# Patient Record
Sex: FEMALE | Race: WHITE | Employment: PART TIME | URBAN - METROPOLITAN AREA
[De-identification: names, ages, dates, MRNs, and addresses within clinical notes are randomized per-mention and may not be internally consistent; named-entity substitution may affect disease eponyms.]

---

## 2017-01-05 PROBLEM — M43.17 ACQUIRED SPONDYLOLISTHESIS OF LUMBOSACRAL REGION: Status: ACTIVE | Noted: 2017-01-05

## 2017-01-13 RX ORDER — LEVOTHYROXINE SODIUM 0.05 MG/1
TABLET ORAL
Qty: 90 TABLET | Refills: 1 | Status: SHIPPED | OUTPATIENT
Start: 2017-01-13 | End: 2017-07-25 | Stop reason: SDUPTHER

## 2017-02-08 ENCOUNTER — OFFICE VISIT (OUTPATIENT)
Dept: FAMILY MEDICINE CLINIC | Age: 57
End: 2017-02-08

## 2017-02-08 VITALS
BODY MASS INDEX: 32.96 KG/M2 | TEMPERATURE: 96.6 F | WEIGHT: 186 LBS | SYSTOLIC BLOOD PRESSURE: 120 MMHG | OXYGEN SATURATION: 98 % | DIASTOLIC BLOOD PRESSURE: 86 MMHG | RESPIRATION RATE: 12 BRPM | HEIGHT: 63 IN | HEART RATE: 83 BPM

## 2017-02-08 DIAGNOSIS — R31.9 HEMATURIA: ICD-10-CM

## 2017-02-08 DIAGNOSIS — N30.01 ACUTE CYSTITIS WITH HEMATURIA: Primary | ICD-10-CM

## 2017-02-08 LAB
ANION GAP SERPL CALCULATED.3IONS-SCNC: 11 MEQ/L (ref 7–13)
ANTISTREPTOLYSIN-O: 48 IU/ML (ref 0–200)
BILIRUBIN, POC: 0
BLOOD URINE, POC: 200
BUN BLDV-MCNC: 22 MG/DL (ref 6–20)
CALCIUM SERPL-MCNC: 10.1 MG/DL (ref 8.6–10.2)
CHLORIDE BLD-SCNC: 103 MEQ/L (ref 98–107)
CLARITY, POC: ABNORMAL
CO2: 25 MEQ/L (ref 22–29)
COLOR, POC: YELLOW
CREAT SERPL-MCNC: 0.73 MG/DL (ref 0.5–0.9)
GFR AFRICAN AMERICAN: >60
GFR NON-AFRICAN AMERICAN: >60
GLUCOSE BLD-MCNC: 97 MG/DL (ref 74–109)
GLUCOSE URINE, POC: 0
KETONES, POC: 0
LEUKOCYTE EST, POC: 0
NITRITE, POC: 0
PH, POC: 5.5
POTASSIUM SERPL-SCNC: 4.6 MEQ/L (ref 3.5–5.1)
PROTEIN, POC: 0
SODIUM BLD-SCNC: 139 MEQ/L (ref 132–144)
SPECIFIC GRAVITY, POC: 1015
UROBILINOGEN, POC: 0

## 2017-02-08 PROCEDURE — 99213 OFFICE O/P EST LOW 20 MIN: CPT | Performed by: FAMILY MEDICINE

## 2017-02-08 PROCEDURE — 81003 URINALYSIS AUTO W/O SCOPE: CPT | Performed by: FAMILY MEDICINE

## 2017-02-08 RX ORDER — SULFAMETHOXAZOLE AND TRIMETHOPRIM 400; 80 MG/1; MG/1
1 TABLET ORAL 2 TIMES DAILY
Qty: 20 TABLET | Refills: 0 | Status: SHIPPED | OUTPATIENT
Start: 2017-02-08 | End: 2017-02-18

## 2017-02-08 ASSESSMENT — ENCOUNTER SYMPTOMS
VOMITING: 0
ABDOMINAL PAIN: 0
FACIAL SWELLING: 0
NAUSEA: 0

## 2017-02-08 ASSESSMENT — LIFESTYLE VARIABLES: TOBACCO_USE: 0

## 2017-02-10 LAB — URINE CULTURE, ROUTINE: NORMAL

## 2017-02-18 ENCOUNTER — HOSPITAL ENCOUNTER (OUTPATIENT)
Dept: CT IMAGING | Age: 57
Discharge: HOME | End: 2017-02-18
Payer: COMMERCIAL

## 2017-02-18 VITALS — RESPIRATION RATE: 18 BRPM | DIASTOLIC BLOOD PRESSURE: 77 MMHG | HEART RATE: 89 BPM | SYSTOLIC BLOOD PRESSURE: 117 MMHG

## 2017-02-18 DIAGNOSIS — R31.9 HEMATURIA: ICD-10-CM

## 2017-02-18 PROCEDURE — 2500000003 HC RX 250 WO HCPCS: Performed by: RADIOLOGY

## 2017-02-18 PROCEDURE — 74177 CT ABD & PELVIS W/CONTRAST: CPT

## 2017-02-18 PROCEDURE — 6360000004 HC RX CONTRAST MEDICATION: Performed by: RADIOLOGY

## 2017-02-18 RX ADMIN — IOPAMIDOL 100 ML: 612 INJECTION, SOLUTION INTRAVENOUS at 10:33

## 2017-02-18 RX ADMIN — BARIUM SULFATE 450 ML: 21 SUSPENSION ORAL at 10:33

## 2017-02-22 ENCOUNTER — OFFICE VISIT (OUTPATIENT)
Dept: FAMILY MEDICINE CLINIC | Age: 57
End: 2017-02-22

## 2017-02-22 VITALS
RESPIRATION RATE: 14 BRPM | TEMPERATURE: 95.5 F | SYSTOLIC BLOOD PRESSURE: 114 MMHG | HEART RATE: 72 BPM | HEIGHT: 62 IN | WEIGHT: 185 LBS | DIASTOLIC BLOOD PRESSURE: 80 MMHG | BODY MASS INDEX: 34.04 KG/M2

## 2017-02-22 DIAGNOSIS — R31.9 HEMATURIA: ICD-10-CM

## 2017-02-22 DIAGNOSIS — N20.0 NEPHROLITHIASIS: Primary | ICD-10-CM

## 2017-02-22 PROCEDURE — 99213 OFFICE O/P EST LOW 20 MIN: CPT | Performed by: FAMILY MEDICINE

## 2017-02-22 ASSESSMENT — ENCOUNTER SYMPTOMS
FACIAL SWELLING: 0
ABDOMINAL PAIN: 0
VOMITING: 0
NAUSEA: 0

## 2017-04-17 RX ORDER — ZOLPIDEM TARTRATE 10 MG/1
TABLET ORAL
Qty: 30 TABLET | Refills: 2 | Status: SHIPPED | OUTPATIENT
Start: 2017-04-17 | End: 2017-07-10 | Stop reason: SDUPTHER

## 2017-04-26 RX ORDER — VILAZODONE HYDROCHLORIDE 40 MG/1
TABLET ORAL
Qty: 90 TABLET | Refills: 1 | Status: SHIPPED | OUTPATIENT
Start: 2017-04-26 | End: 2017-07-10 | Stop reason: ALTCHOICE

## 2017-05-01 ENCOUNTER — OFFICE VISIT (OUTPATIENT)
Dept: FAMILY MEDICINE CLINIC | Age: 57
End: 2017-05-01

## 2017-05-01 VITALS
SYSTOLIC BLOOD PRESSURE: 116 MMHG | HEART RATE: 78 BPM | RESPIRATION RATE: 16 BRPM | TEMPERATURE: 96.5 F | HEIGHT: 62 IN | DIASTOLIC BLOOD PRESSURE: 60 MMHG

## 2017-05-01 DIAGNOSIS — R31.9 HEMATURIA: Primary | ICD-10-CM

## 2017-05-01 DIAGNOSIS — E03.9 HYPOTHYROIDISM, UNSPECIFIED TYPE: ICD-10-CM

## 2017-05-01 DIAGNOSIS — F32.A DEPRESSION, UNSPECIFIED DEPRESSION TYPE: ICD-10-CM

## 2017-05-01 DIAGNOSIS — E78.2 MIXED HYPERLIPIDEMIA: ICD-10-CM

## 2017-05-01 DIAGNOSIS — Z13.31 POSITIVE DEPRESSION SCREENING: ICD-10-CM

## 2017-05-01 DIAGNOSIS — Z86.010 HISTORY OF COLON POLYPS: ICD-10-CM

## 2017-05-01 DIAGNOSIS — K59.01 SLOW TRANSIT CONSTIPATION: ICD-10-CM

## 2017-05-01 DIAGNOSIS — F41.9 ANXIETY: ICD-10-CM

## 2017-05-01 DIAGNOSIS — R31.9 HEMATURIA: ICD-10-CM

## 2017-05-01 PROBLEM — N20.0 NEPHROLITHIASIS: Status: RESOLVED | Noted: 2017-02-22 | Resolved: 2017-05-01

## 2017-05-01 LAB
BILIRUBIN, POC: NORMAL
BLOOD URINE, POC: NORMAL
CLARITY, POC: NORMAL
COLOR, POC: NORMAL
GLUCOSE URINE, POC: NORMAL
KETONES, POC: NORMAL
LEUKOCYTE EST, POC: NORMAL
NITRITE, POC: NORMAL
PH, POC: 5.5
PROTEIN, POC: NORMAL
SPECIFIC GRAVITY, POC: 1.03
UROBILINOGEN, POC: NORMAL

## 2017-05-01 PROCEDURE — 81003 URINALYSIS AUTO W/O SCOPE: CPT | Performed by: FAMILY MEDICINE

## 2017-05-01 PROCEDURE — 99214 OFFICE O/P EST MOD 30 MIN: CPT | Performed by: FAMILY MEDICINE

## 2017-05-01 PROCEDURE — G8431 POS CLIN DEPRES SCRN F/U DOC: HCPCS | Performed by: FAMILY MEDICINE

## 2017-05-01 RX ORDER — VENLAFAXINE HYDROCHLORIDE 75 MG/1
75 CAPSULE, EXTENDED RELEASE ORAL DAILY
Qty: 30 CAPSULE | Refills: 3 | Status: SHIPPED | OUTPATIENT
Start: 2017-05-01 | End: 2017-07-10 | Stop reason: SDUPTHER

## 2017-05-01 ASSESSMENT — PATIENT HEALTH QUESTIONNAIRE - PHQ9
2. FEELING DOWN, DEPRESSED OR HOPELESS: 3
5. POOR APPETITE OR OVEREATING: 2
4. FEELING TIRED OR HAVING LITTLE ENERGY: 3
8. MOVING OR SPEAKING SO SLOWLY THAT OTHER PEOPLE COULD HAVE NOTICED. OR THE OPPOSITE, BEING SO FIGETY OR RESTLESS THAT YOU HAVE BEEN MOVING AROUND A LOT MORE THAN USUAL: 0
SUM OF ALL RESPONSES TO PHQ9 QUESTIONS 1 & 2: 4
SUM OF ALL RESPONSES TO PHQ QUESTIONS 1-9: 16
7. TROUBLE CONCENTRATING ON THINGS, SUCH AS READING THE NEWSPAPER OR WATCHING TELEVISION: 0
10. IF YOU CHECKED OFF ANY PROBLEMS, HOW DIFFICULT HAVE THESE PROBLEMS MADE IT FOR YOU TO DO YOUR WORK, TAKE CARE OF THINGS AT HOME, OR GET ALONG WITH OTHER PEOPLE: 1
3. TROUBLE FALLING OR STAYING ASLEEP: 3
6. FEELING BAD ABOUT YOURSELF - OR THAT YOU ARE A FAILURE OR HAVE LET YOURSELF OR YOUR FAMILY DOWN: 3
1. LITTLE INTEREST OR PLEASURE IN DOING THINGS: 1
9. THOUGHTS THAT YOU WOULD BE BETTER OFF DEAD, OR OF HURTING YOURSELF: 1

## 2017-05-03 LAB — URINE CULTURE, ROUTINE: NORMAL

## 2017-07-10 ENCOUNTER — CLINICAL DOCUMENTATION (OUTPATIENT)
Dept: FAMILY MEDICINE CLINIC | Age: 57
End: 2017-07-10

## 2017-07-10 ENCOUNTER — OFFICE VISIT (OUTPATIENT)
Dept: FAMILY MEDICINE CLINIC | Age: 57
End: 2017-07-10

## 2017-07-10 VITALS
TEMPERATURE: 97.4 F | WEIGHT: 179 LBS | BODY MASS INDEX: 32.94 KG/M2 | HEIGHT: 62 IN | HEART RATE: 76 BPM | RESPIRATION RATE: 16 BRPM | SYSTOLIC BLOOD PRESSURE: 122 MMHG | DIASTOLIC BLOOD PRESSURE: 80 MMHG

## 2017-07-10 DIAGNOSIS — E03.9 HYPOTHYROIDISM, UNSPECIFIED TYPE: ICD-10-CM

## 2017-07-10 DIAGNOSIS — F32.A DEPRESSION, UNSPECIFIED DEPRESSION TYPE: ICD-10-CM

## 2017-07-10 DIAGNOSIS — Z12.31 ENCOUNTER FOR SCREENING MAMMOGRAM FOR BREAST CANCER: ICD-10-CM

## 2017-07-10 DIAGNOSIS — F41.9 ANXIETY: ICD-10-CM

## 2017-07-10 DIAGNOSIS — E78.2 MIXED HYPERLIPIDEMIA: Primary | ICD-10-CM

## 2017-07-10 DIAGNOSIS — E78.2 MIXED HYPERLIPIDEMIA: ICD-10-CM

## 2017-07-10 LAB
ALBUMIN SERPL-MCNC: 4.1 G/DL (ref 3.9–4.9)
ALP BLD-CCNC: 90 U/L (ref 40–130)
ALT SERPL-CCNC: 14 U/L (ref 0–33)
ANION GAP SERPL CALCULATED.3IONS-SCNC: 12 MEQ/L (ref 7–13)
AST SERPL-CCNC: 17 U/L (ref 0–35)
BILIRUB SERPL-MCNC: 0.2 MG/DL (ref 0–1.2)
BUN BLDV-MCNC: 15 MG/DL (ref 6–20)
CALCIUM SERPL-MCNC: 10.4 MG/DL (ref 8.6–10.2)
CHLORIDE BLD-SCNC: 102 MEQ/L (ref 98–107)
CHOLESTEROL, TOTAL: 228 MG/DL (ref 0–199)
CO2: 26 MEQ/L (ref 22–29)
CREAT SERPL-MCNC: 0.56 MG/DL (ref 0.5–0.9)
GFR AFRICAN AMERICAN: >60
GFR NON-AFRICAN AMERICAN: >60
GLOBULIN: 2.6 G/DL (ref 2.3–3.5)
GLUCOSE BLD-MCNC: 95 MG/DL (ref 74–109)
HCT VFR BLD CALC: 41.4 % (ref 37–47)
HDLC SERPL-MCNC: 52 MG/DL (ref 40–59)
HEMOGLOBIN: 13.3 G/DL (ref 12–16)
LDL CHOLESTEROL CALCULATED: 104 MG/DL (ref 0–129)
MCH RBC QN AUTO: 29.7 PG (ref 27–31.3)
MCHC RBC AUTO-ENTMCNC: 32.1 % (ref 33–37)
MCV RBC AUTO: 92.3 FL (ref 82–100)
PDW BLD-RTO: 14.9 % (ref 11.5–14.5)
PLATELET # BLD: 223 K/UL (ref 130–400)
POTASSIUM SERPL-SCNC: 3.9 MEQ/L (ref 3.5–5.1)
RBC # BLD: 4.48 M/UL (ref 4.2–5.4)
SODIUM BLD-SCNC: 140 MEQ/L (ref 132–144)
T4 FREE: 0.95 NG/DL (ref 0.93–1.7)
TOTAL PROTEIN: 6.7 G/DL (ref 6.4–8.1)
TRIGL SERPL-MCNC: 358 MG/DL (ref 0–200)
TSH SERPL DL<=0.05 MIU/L-ACNC: 0.81 UIU/ML (ref 0.27–4.2)
WBC # BLD: 5.7 K/UL (ref 4.8–10.8)

## 2017-07-10 PROCEDURE — 99214 OFFICE O/P EST MOD 30 MIN: CPT | Performed by: FAMILY MEDICINE

## 2017-07-10 RX ORDER — ALPRAZOLAM 0.5 MG/1
0.5 TABLET ORAL 2 TIMES DAILY PRN
Qty: 60 TABLET | Refills: 2 | Status: SHIPPED | OUTPATIENT
Start: 2017-07-10 | End: 2018-01-17 | Stop reason: SDUPTHER

## 2017-07-10 RX ORDER — ZOLPIDEM TARTRATE 10 MG/1
TABLET ORAL
Qty: 30 TABLET | Refills: 2 | Status: SHIPPED | OUTPATIENT
Start: 2017-07-10 | End: 2017-10-10 | Stop reason: SDUPTHER

## 2017-07-10 RX ORDER — VENLAFAXINE HYDROCHLORIDE 75 MG/1
75 CAPSULE, EXTENDED RELEASE ORAL DAILY
Qty: 90 CAPSULE | Refills: 1 | Status: SHIPPED | OUTPATIENT
Start: 2017-07-10 | End: 2017-11-06 | Stop reason: SDUPTHER

## 2017-07-25 RX ORDER — LEVOTHYROXINE SODIUM 0.05 MG/1
TABLET ORAL
Qty: 90 TABLET | Refills: 1 | Status: SHIPPED | OUTPATIENT
Start: 2017-07-25 | End: 2018-01-18 | Stop reason: DRUGHIGH

## 2017-09-11 ENCOUNTER — HOSPITAL ENCOUNTER (OUTPATIENT)
Dept: WOMENS IMAGING | Age: 57
Discharge: HOME | End: 2017-09-11
Payer: COMMERCIAL

## 2017-09-11 DIAGNOSIS — Z12.31 ENCOUNTER FOR SCREENING MAMMOGRAM FOR BREAST CANCER: ICD-10-CM

## 2017-09-11 PROCEDURE — 77063 BREAST TOMOSYNTHESIS BI: CPT

## 2017-09-13 DIAGNOSIS — R92.8 ABNORMAL MAMMOGRAM: Primary | ICD-10-CM

## 2017-09-20 ENCOUNTER — TELEPHONE (OUTPATIENT)
Dept: FAMILY MEDICINE CLINIC | Age: 57
End: 2017-09-20

## 2017-09-20 ENCOUNTER — HOSPITAL ENCOUNTER (OUTPATIENT)
Dept: WOMENS IMAGING | Age: 57
Discharge: HOME | End: 2017-09-20
Payer: COMMERCIAL

## 2017-09-20 ENCOUNTER — HOSPITAL ENCOUNTER (OUTPATIENT)
Dept: ULTRASOUND IMAGING | Age: 57
Discharge: HOME | End: 2017-09-20
Payer: COMMERCIAL

## 2017-09-20 DIAGNOSIS — R92.8 ABNORMAL MAMMOGRAM: ICD-10-CM

## 2017-09-20 PROCEDURE — G0206 DX MAMMO INCL CAD UNI: HCPCS

## 2017-09-20 PROCEDURE — 76642 ULTRASOUND BREAST LIMITED: CPT

## 2017-09-22 DIAGNOSIS — R92.8 ABNORMAL MAMMOGRAM: Primary | ICD-10-CM

## 2017-10-06 ENCOUNTER — HOSPITAL ENCOUNTER (OUTPATIENT)
Dept: MRI IMAGING | Age: 57
Discharge: HOME | End: 2017-10-06
Payer: COMMERCIAL

## 2017-10-06 DIAGNOSIS — R92.8 ABNORMAL MAMMOGRAM: ICD-10-CM

## 2017-10-06 PROCEDURE — 6360000004 HC RX CONTRAST MEDICATION: Performed by: RADIOLOGY

## 2017-10-06 PROCEDURE — A9577 INJ MULTIHANCE: HCPCS | Performed by: RADIOLOGY

## 2017-10-06 PROCEDURE — 77058 MRI BREAST LEFT W WO CONTRAST: CPT

## 2017-10-06 PROCEDURE — 77058 MRI BREAST RIGHT W WO CONTRAST: CPT

## 2017-10-06 RX ORDER — 0.9 % SODIUM CHLORIDE 0.9 %
40 VIAL (ML) INJECTION ONCE
Status: DISCONTINUED | OUTPATIENT
Start: 2017-10-06 | End: 2017-10-09 | Stop reason: HOSPADM

## 2017-10-06 RX ADMIN — GADOBENATE DIMEGLUMINE 20 ML: 529 INJECTION, SOLUTION INTRAVENOUS at 09:15

## 2017-10-10 ENCOUNTER — OFFICE VISIT (OUTPATIENT)
Dept: FAMILY MEDICINE CLINIC | Age: 57
End: 2017-10-10

## 2017-10-10 ENCOUNTER — CLINICAL DOCUMENTATION (OUTPATIENT)
Dept: FAMILY MEDICINE CLINIC | Age: 57
End: 2017-10-10

## 2017-10-10 VITALS
SYSTOLIC BLOOD PRESSURE: 132 MMHG | TEMPERATURE: 98.3 F | HEIGHT: 62 IN | RESPIRATION RATE: 16 BRPM | HEART RATE: 84 BPM | DIASTOLIC BLOOD PRESSURE: 76 MMHG

## 2017-10-10 DIAGNOSIS — Z71.2 ENCOUNTER TO DISCUSS TEST RESULTS: Primary | ICD-10-CM

## 2017-10-10 DIAGNOSIS — F41.9 ANXIETY: ICD-10-CM

## 2017-10-10 PROCEDURE — 99213 OFFICE O/P EST LOW 20 MIN: CPT | Performed by: FAMILY MEDICINE

## 2017-10-10 RX ORDER — ZOLPIDEM TARTRATE 10 MG/1
TABLET ORAL
Qty: 30 TABLET | Refills: 2 | Status: SHIPPED | OUTPATIENT
Start: 2017-10-10 | End: 2018-01-17 | Stop reason: SDUPTHER

## 2017-10-10 NOTE — PATIENT INSTRUCTIONS
Thank you for enrolling in [address] Please follow the instructions below to securely access your online medical record. [employer] allows you to send messages to your doctor, view your test results, renew your prescriptions, schedule appointments, and more. How Do I Sign Up? 1. In your Internet browser, go to [email or web address]  2. Click on the Sign Up Now link in the Sign In box. You will see the New Member Sign Up page. 3. Enter your [employer] Access Code exactly as it appears below. You will not need to use this code after youve completed the sign-up process. If you do not sign up before the expiration date, you must request a new code. [employer] Access Code: [email or web address]  Expires: 12/3/2017  5:02 AM    4. Enter your Social Security Number ([id number]) and Date of Birth (mm/dd/yyyy) as indicated and click Submit. You will be taken to the next sign-up page. 5. Create a [employer] ID. This will be your [employer] login ID and cannot be changed, so think of one that is secure and easy to remember. 6. Create a [employer] password. You can change your password at any time. 7. Enter your Password Reset Question and Answer. This can be used at a later time if you forget your password. 8. Enter your e-mail address. You will receive e-mail notification when new information is available in [address] 9. Click Sign Up. You can now view your medical record. Additional Information  If you have questions, please contact your physician practice where you receive care. Remember, [employer] is NOT to be used for urgent needs. For medical emergencies, dial 911.

## 2017-10-10 NOTE — PROGRESS NOTES
Subjective  Dash Botello, 62 y.o. female presents today with:  Chief Complaint   Patient presents with    Results     breast mri           Past Medical History:   Diagnosis Date    Anxiety     CAD (coronary artery disease)     past episodes chest pains / evaluation by [facility] / holter monitor superventricular premature beats    Chronic back pain     Depression     Hyperlipidemia     past trx > 5 yrs /stopped Lipitor due to muscle & joint pain    Hypothyroidism     meds < 5 yrs    Osteoarthritis     major joints & back    PONV (postoperative nausea and vomiting)     Retention of urine      Past Surgical History:   Procedure Laterality Date    BREAST SURGERY  age 52    exc. benign left breast mass    [facility]AC [facility]TERIZATION  2007    [facility]    CARPAL TUNNEL RELEASE  1986    CHOLECYSTECTOMY      COLONOSCOPY  9/10/12    DR Oseas Rashid    HYSTERECTOMY  7-2009    LUMBAR FUSION N/A 11/7/2016    LEFT AND BILATERAL L4-5 MICRODISSECTION DECOMPRESSION DISKECTOMY INTERBODY POSTEROLATERAL FUSION PEDICLE SCREWS performed by Barb Gan MD at [address] [place] [address]  2005    TEAR/THROMBOSIS    SKIN CANCER EXCISION  1983    BASAL CELL    TONSILLECTOMY      WISDOM TOOTH EXTRACTION  1985     Social History     Social History    Marital status:      Spouse name: N/A    Number of children: N/A    Years of education: N/A     Occupational History    Not on file.      Social History Main Topics    Smoking status: Never Smoker    Smokeless tobacco: Never Used    Alcohol use No    Drug use: No    Sexual activity: Not on file     Other Topics Concern    Not on file     Social History Narrative    No narrative on file     Family History   Problem Relation Age of Onset    Mental Illness Mother     Alcohol Abuse Father     Arthritis Sister     Mental Illness Brother      Allergies   Allergen Reactions    Codeine Other (See Comments)     hallucinations    Lipitor [Atorvastatin] Other (See Comments) 0 - 129 mg/dL Final    WBC 07/10/2017 5.7  4.8 - 10.8 K/uL Final    RBC 07/10/2017 4.48  4.20 - 5.40 M/uL Final    Hemoglobin 07/10/2017 13.3  12.0 - 16.0 g/dL Final    Hematocrit 07/10/2017 41.4  37.0 - 47.0 % Final    MCV 07/10/2017 92.3  82.0 - 100.0 fL Final    MCH 07/10/2017 29.7  27.0 - 31.3 pg Final    MCHC 07/10/2017 32.1* 33.0 - 37.0 % Final    RDW 07/10/2017 14.9* 11.5 - 14.5 % Final    Platelets 88/49/3643 223  130 - 400 K/uL Final    Sodium 07/10/2017 140  132 - 144 mEq/L Final    Potassium 07/10/2017 3.9  3.5 - 5.1 mEq/L Final    Chloride 07/10/2017 102  98 - 107 mEq/L Final    CO2 07/10/2017 26  22 - 29 mEq/L Final    Anion Gap 07/10/2017 12  7 - 13 mEq/L Final    Glucose 07/10/2017 95  74 - 109 mg/dL Final    BUN 07/10/2017 15  6 - 20 mg/dL Final    CREATININE 07/10/2017 0.56  0.50 - 0.90 mg/dL Final    GFR Non- 07/10/2017 >60.0  >60 Final    Comment: >60 mL/min/1.73m2 EGFR, calc. for ages 25 and older using the  MDRD formula (not corrected for weight), is valid for stable  renal function.  GFR  07/10/2017 >60.0  >60 Final    Comment: >60 mL/min/1.73m2 EGFR, calc. for ages 25 and older using the  MDRD formula (not corrected for weight), is valid for stable  renal function.       Calcium 07/10/2017 10.4* 8.6 - 10.2 mg/dL Final    Total Protein 07/10/2017 6.7  6.4 - 8.1 g/dL Final    Alb 07/10/2017 4.1  3.9 - 4.9 g/dL Final    Total Bilirubin 07/10/2017 0.2  0.0 - 1.2 mg/dL Final    Alkaline Phosphatase 07/10/2017 90  40 - 130 U/L Final    ALT 07/10/2017 14  0 - 33 U/L Final    AST 07/10/2017 17  0 - 35 U/L Final    Globulin 07/10/2017 2.6  2.3 - 3.5 g/dL Final    TSH 07/10/2017 0.812  0.270 - 4.200 uIU/mL Final    T4 Free 07/10/2017 0.95  0.93 - 1.70 ng/dL Final     Health Maintenance   Topic Date Due    Hepatitis C screen  1960    HIV screen  04/18/1975    Colon cancer screen colonoscopy  09/10/2015    Flu vaccine (1) 09/01/2017    DTaP/Tdap/Td vaccine (1 - Tdap) 11/01/2017 (Originally 4/18/1979)    Breast cancer screen  09/20/2019    Lipid screen  07/10/2022       No results found for this visit on 10/10/17. Objective    Vitals:    10/10/17 1401   BP: 132/76   Pulse: 84   Resp: 16   Temp: 98.3 °F (36.8 °C)   TempSrc: Tympanic   Height: 5' 2\" (1.575 m)       PHYSICAL EXAMINATION:        GENERAL:    The patient appears well nourished and well-developed,     Normal affect. Not appearing significantly anxious or depressed. No acute respiratory distress. Alert and oriented times 3. Skin:     No skin rashes. No concerning moles observed. Gait:    Normal gait. No ataxia. HEENT:  Normocephalic, atraumatic. Throat:  Pharynx is clear, no erythema/ edema or exudates   Ears:    TMs normal bilaterally. Canals and ears normal   Eyes:  Extraocular eye motions intact and pain free. Pupils reactive/equal    Sclerae and conjunctivae clear    NECK: No masses or adenopathy palpable. No carotid bruits heard. No asymmetry visible. No thyromegaly. RESPIRATORY:   Clear/ Equal breath sounds /No acute respiratory distress. No wheezes,rales, or rhonchi. No percussive abnormalities    HEART: Regular rhythm without murmur, rub or gallop. ABDOMEN:  Soft, non tender. No masses, guarding or rebound. Normo active bowel sounds. EXTREMITIES:  No edema in any extremity. No cyanosis or clubbing. 2+ dorsalis pedis pulses bilaterally      Breast exam no masses, lymphadenopathy, rashes or nipple discharge bilaterally. Can palpate tiny 2-3mm area 9 oclock r breast mobile not concerning may be lymph node seen on mri  ak and sk r neck and r lower lid ak vs sk    Assessment & Plan   1. Encounter to discuss test results     2. Anxiety  zolpidem (AMBIEN) 10 MG tablet     No orders of the defined types were placed in this encounter.     Orders Placed This Encounter   Medications    zolpidem (AMBIEN) 10 MG tablet     Sig: TAKE 1 TABLET BY MOUTH NIGHTLY AS NEEDED for sleep     Dispense:  30 tablet     Refill:  2     Medications Discontinued During This Encounter   Medication Reason    zolpidem (AMBIEN) 10 MG tablet Reorder     No Follow-up on file.   Sleeping ok  Low cho diet  Recheck mammogram 1 y  Stella Eason MD

## 2017-11-06 ENCOUNTER — PROCEDURE VISIT (OUTPATIENT)
Dept: FAMILY MEDICINE CLINIC | Age: 57
End: 2017-11-06

## 2017-11-06 VITALS
DIASTOLIC BLOOD PRESSURE: 76 MMHG | SYSTOLIC BLOOD PRESSURE: 106 MMHG | TEMPERATURE: 97.9 F | OXYGEN SATURATION: 98 % | RESPIRATION RATE: 14 BRPM | HEART RATE: 88 BPM | HEIGHT: 62 IN

## 2017-11-06 DIAGNOSIS — F41.9 ANXIETY: ICD-10-CM

## 2017-11-06 DIAGNOSIS — L71.0 PERIORAL DERMATITIS: ICD-10-CM

## 2017-11-06 DIAGNOSIS — R20.8 OTHER DISTURBANCES OF SKIN SENSATION: Primary | ICD-10-CM

## 2017-11-06 DIAGNOSIS — E03.9 HYPOTHYROIDISM, UNSPECIFIED TYPE: ICD-10-CM

## 2017-11-06 DIAGNOSIS — E78.2 MIXED HYPERLIPIDEMIA: ICD-10-CM

## 2017-11-06 DIAGNOSIS — F32.A DEPRESSION, UNSPECIFIED DEPRESSION TYPE: ICD-10-CM

## 2017-11-06 PROCEDURE — 17000 DESTRUCT PREMALG LESION: CPT | Performed by: FAMILY MEDICINE

## 2017-11-06 PROCEDURE — 3014F SCREEN MAMMO DOC REV: CPT | Performed by: FAMILY MEDICINE

## 2017-11-06 PROCEDURE — 1036F TOBACCO NON-USER: CPT | Performed by: FAMILY MEDICINE

## 2017-11-06 PROCEDURE — G8484 FLU IMMUNIZE NO ADMIN: HCPCS | Performed by: FAMILY MEDICINE

## 2017-11-06 PROCEDURE — 99213 OFFICE O/P EST LOW 20 MIN: CPT | Performed by: FAMILY MEDICINE

## 2017-11-06 PROCEDURE — 3017F COLORECTAL CA SCREEN DOC REV: CPT | Performed by: FAMILY MEDICINE

## 2017-11-06 PROCEDURE — G8598 ASA/ANTIPLAT THER USED: HCPCS | Performed by: FAMILY MEDICINE

## 2017-11-06 PROCEDURE — G8417 CALC BMI ABV UP PARAM F/U: HCPCS | Performed by: FAMILY MEDICINE

## 2017-11-06 PROCEDURE — G8427 DOCREV CUR MEDS BY ELIG CLIN: HCPCS | Performed by: FAMILY MEDICINE

## 2017-11-06 PROCEDURE — 17003 DESTRUCT PREMALG LES 2-14: CPT | Performed by: FAMILY MEDICINE

## 2017-11-06 RX ORDER — VENLAFAXINE HYDROCHLORIDE 75 MG/1
75 CAPSULE, EXTENDED RELEASE ORAL DAILY
Qty: 90 CAPSULE | Refills: 3 | Status: SHIPPED | OUTPATIENT
Start: 2017-11-06 | End: 2018-10-09 | Stop reason: SDUPTHER

## 2017-11-06 NOTE — PROGRESS NOTES
ng/dL Final     Health Maintenance   Topic Date Due    Hepatitis C screen  1960    HIV screen  04/18/1975    DTaP/Tdap/Td vaccine (1 - Tdap) 11/06/2018 (Originally 4/18/1979)    Flu vaccine (1) 11/06/2018 (Originally 9/1/2017)    Breast cancer screen  09/20/2019    Lipid screen  07/10/2022    Colon cancer screen colonoscopy  07/24/2022       No results found for this visit on 11/06/17. Objective    Vitals:    11/06/17 1647   BP: 106/76   Pulse: 88   Resp: 14   Temp: 97.9 °F (36.6 °C)   TempSrc: Temporal   SpO2: 98%   Height: 5' 2\" (1.575 m)       PHYSICAL EXAMINATION:        GENERAL:    The patient appears well nourished and well-developed,     Normal affect. Not appearing significantly anxious or depressed. No acute respiratory distress. Alert and oriented times 3. Skin:     No skin rashes. No concerning moles observed. Gait:    Normal gait. No ataxia. HEENT:  Normocephalic, atraumatic. Throat:  Pharynx is clear, no erythema/ edema or exudates   Ears:    TMs normal bilaterally. Canals and ears normal   Eyes:  Extraocular eye motions intact and pain free. Pupils reactive/equal    Sclerae and conjunctivae clear    NECK: No masses or adenopathy palpable. No carotid bruits heard. No asymmetry visible. No thyromegaly. RESPIRATORY:   Clear/ Equal breath sounds /No acute respiratory distress. No wheezes,rales, or rhonchi. No percussive abnormalities    HEART: Regular rhythm without murmur, rub or gallop. ABDOMEN:  Soft, non tender. No masses, guarding or rebound. Normo active bowel sounds. EXTREMITIES:  No edema in any extremity. No cyanosis or clubbing. 2+ dorsalis pedis pulses bilaterally      Eczema 1-5mm at corners of mouth minimal erythema  r neck and mid neck r nasolabial fold area on R  Tiny AKs vs irritated sks frozen-3 sec each      Assessment & Plan   1. Other disturbances of skin sensation     2. Mixed hyperlipidemia     3. Hypothyroidism, unspecified type     4. Anxiety  venlafaxine (EFFEXOR XR) 75 MG extended release capsule   5. Depression, unspecified depression type  venlafaxine (EFFEXOR XR) 75 MG extended release capsule   6. Perioral dermatitis       No orders of the defined types were placed in this encounter. Orders Placed This Encounter   Medications    venlafaxine (EFFEXOR XR) 75 MG extended release capsule     Sig: Take 1 capsule by mouth daily     Dispense:  90 capsule     Refill:  3     Medications Discontinued During This Encounter   Medication Reason    venlafaxine (EFFEXOR XR) 75 MG extended release capsule Reorder     Return in about 6 months (around 5/6/2018), or if symptoms worsen or fail to improve.   1st x 1-2 wks 1%cortisone  w desitin atop  If not better-change to lotrisone and if not better 4 wks total-return  Sooner if worse  Non resolution of frozen areas needs re-tx    Elijah Rangel MD

## 2018-01-17 ENCOUNTER — CLINICAL DOCUMENTATION (OUTPATIENT)
Dept: FAMILY MEDICINE CLINIC | Age: 58
End: 2018-01-17

## 2018-01-17 ENCOUNTER — OFFICE VISIT (OUTPATIENT)
Dept: FAMILY MEDICINE CLINIC | Age: 58
End: 2018-01-17

## 2018-01-17 VITALS
WEIGHT: 178 LBS | HEART RATE: 78 BPM | DIASTOLIC BLOOD PRESSURE: 74 MMHG | SYSTOLIC BLOOD PRESSURE: 108 MMHG | HEIGHT: 62 IN | TEMPERATURE: 98.6 F | BODY MASS INDEX: 32.76 KG/M2 | RESPIRATION RATE: 16 BRPM

## 2018-01-17 DIAGNOSIS — I25.10 CORONARY ARTERY DISEASE, ANGINA PRESENCE UNSPECIFIED, UNSPECIFIED VESSEL OR LESION TYPE, UNSPECIFIED WHETHER NATIVE OR TRANSPLANTED HEART: ICD-10-CM

## 2018-01-17 DIAGNOSIS — E03.9 HYPOTHYROIDISM, UNSPECIFIED TYPE: Primary | ICD-10-CM

## 2018-01-17 DIAGNOSIS — K64.8 HEMORRHOID PROLAPSE: ICD-10-CM

## 2018-01-17 DIAGNOSIS — F32.A DEPRESSION, UNSPECIFIED DEPRESSION TYPE: ICD-10-CM

## 2018-01-17 DIAGNOSIS — G89.29 HEEL PAIN, CHRONIC, RIGHT: ICD-10-CM

## 2018-01-17 DIAGNOSIS — E03.9 HYPOTHYROIDISM, UNSPECIFIED TYPE: ICD-10-CM

## 2018-01-17 DIAGNOSIS — M79.671 HEEL PAIN, CHRONIC, RIGHT: ICD-10-CM

## 2018-01-17 DIAGNOSIS — E78.2 MIXED HYPERLIPIDEMIA: ICD-10-CM

## 2018-01-17 DIAGNOSIS — J01.00 ACUTE NON-RECURRENT MAXILLARY SINUSITIS: ICD-10-CM

## 2018-01-17 DIAGNOSIS — F41.9 ANXIETY: ICD-10-CM

## 2018-01-17 LAB
ALBUMIN SERPL-MCNC: 4.3 G/DL (ref 3.9–4.9)
ALP BLD-CCNC: 119 U/L (ref 40–130)
ALT SERPL-CCNC: 15 U/L (ref 0–33)
ANION GAP SERPL CALCULATED.3IONS-SCNC: 13 MEQ/L (ref 7–13)
AST SERPL-CCNC: 18 U/L (ref 0–35)
BILIRUB SERPL-MCNC: 0.1 MG/DL (ref 0–1.2)
BUN BLDV-MCNC: 20 MG/DL (ref 6–20)
CALCIUM SERPL-MCNC: 10 MG/DL (ref 8.6–10.2)
CHLORIDE BLD-SCNC: 101 MEQ/L (ref 98–107)
CO2: 27 MEQ/L (ref 22–29)
CREAT SERPL-MCNC: 0.62 MG/DL (ref 0.5–0.9)
GFR AFRICAN AMERICAN: >60
GFR NON-AFRICAN AMERICAN: >60
GLOBULIN: 2.7 G/DL (ref 2.3–3.5)
GLUCOSE BLD-MCNC: 88 MG/DL (ref 74–109)
HCT VFR BLD CALC: 43.1 % (ref 37–47)
HEMOGLOBIN: 13.9 G/DL (ref 12–16)
MCH RBC QN AUTO: 31.3 PG (ref 27–31.3)
MCHC RBC AUTO-ENTMCNC: 32.3 % (ref 33–37)
MCV RBC AUTO: 96.9 FL (ref 82–100)
PDW BLD-RTO: 13.2 % (ref 11.5–14.5)
PLATELET # BLD: 251 K/UL (ref 130–400)
POTASSIUM SERPL-SCNC: 4.8 MEQ/L (ref 3.5–5.1)
RBC # BLD: 4.45 M/UL (ref 4.2–5.4)
SODIUM BLD-SCNC: 141 MEQ/L (ref 132–144)
T4 FREE: 0.89 NG/DL (ref 0.93–1.7)
TOTAL PROTEIN: 7 G/DL (ref 6.4–8.1)
TSH SERPL DL<=0.05 MIU/L-ACNC: 2.27 UIU/ML (ref 0.27–4.2)
WBC # BLD: 6.5 K/UL (ref 4.8–10.8)

## 2018-01-17 PROCEDURE — 1036F TOBACCO NON-USER: CPT | Performed by: FAMILY MEDICINE

## 2018-01-17 PROCEDURE — 3017F COLORECTAL CA SCREEN DOC REV: CPT | Performed by: FAMILY MEDICINE

## 2018-01-17 PROCEDURE — G8598 ASA/ANTIPLAT THER USED: HCPCS | Performed by: FAMILY MEDICINE

## 2018-01-17 PROCEDURE — G8417 CALC BMI ABV UP PARAM F/U: HCPCS | Performed by: FAMILY MEDICINE

## 2018-01-17 PROCEDURE — 99214 OFFICE O/P EST MOD 30 MIN: CPT | Performed by: FAMILY MEDICINE

## 2018-01-17 PROCEDURE — G8484 FLU IMMUNIZE NO ADMIN: HCPCS | Performed by: FAMILY MEDICINE

## 2018-01-17 PROCEDURE — 3014F SCREEN MAMMO DOC REV: CPT | Performed by: FAMILY MEDICINE

## 2018-01-17 PROCEDURE — G8427 DOCREV CUR MEDS BY ELIG CLIN: HCPCS | Performed by: FAMILY MEDICINE

## 2018-01-17 RX ORDER — ZOLPIDEM TARTRATE 10 MG/1
TABLET ORAL
Qty: 30 TABLET | Refills: 2 | Status: SHIPPED | OUTPATIENT
Start: 2018-01-17 | End: 2018-02-16

## 2018-01-17 RX ORDER — AMOXICILLIN 875 MG/1
875 TABLET, COATED ORAL 2 TIMES DAILY
Qty: 20 TABLET | Refills: 0 | Status: SHIPPED | OUTPATIENT
Start: 2018-01-17 | End: 2018-01-27

## 2018-01-17 RX ORDER — ALPRAZOLAM 0.5 MG/1
0.5 TABLET ORAL 2 TIMES DAILY PRN
Qty: 60 TABLET | Refills: 2 | Status: SHIPPED | OUTPATIENT
Start: 2018-01-17 | End: 2018-09-04 | Stop reason: SDUPTHER

## 2018-01-17 NOTE — PROGRESS NOTES
- 35 U/L Final    Globulin 07/10/2017 2.6  2.3 - 3.5 g/dL Final    TSH 07/10/2017 0.812  0.270 - 4.200 uIU/mL Final    T4 Free 07/10/2017 0.95  0.93 - 1.70 ng/dL Final     Health Maintenance   Topic Date Due    Hepatitis C screen  1960    HIV screen  04/18/1975    DTaP/Tdap/Td vaccine (1 - Tdap) 11/06/2018 (Originally 4/18/1979)    Flu vaccine (1) 11/06/2018 (Originally 9/1/2017)    TSH testing  07/10/2018    Breast cancer screen  09/20/2019    Lipid screen  07/10/2022    Colon cancer screen colonoscopy  07/24/2022       No results found for this visit on 01/17/18. Objective    Vitals:    01/17/18 0850   BP: 108/74   Pulse: 78   Resp: 16   Temp: 98.6 °F (37 °C)   TempSrc: Oral   Weight: 178 lb (80.7 kg)   Height: 5' 2\" (1.575 m)       PHYSICAL EXAMINATION:        GENERAL:    The patient appears well nourished and well-developed,     Normal affect. Not appearing significantly anxious or depressed. No acute respiratory distress. Alert and oriented times 3. Skin:     No skin rashes. No concerning moles observed. Gait:    Normal gait. No ataxia. HEENT:  Normocephalic, atraumatic. Throat:  Pharynx is clear, no erythema/ edema or exudates   Ears:    TMs normal bilaterally. Canals and ears normal   Eyes:  Extraocular eye motions intact and pain free. Pupils reactive/equal    Sclerae and conjunctivae clear    NECK: No masses or adenopathy palpable. No carotid bruits heard. No asymmetry visible. No thyromegaly. RESPIRATORY:   Clear/ Equal breath sounds /No acute respiratory distress. No wheezes,rales, or rhonchi. No percussive abnormalities    HEART: Regular rhythm without murmur, rub or gallop. ABDOMEN:  Soft, non tender. No masses, guarding or rebound. Normo active bowel sounds. EXTREMITIES:  No edema in any extremity. No cyanosis or clubbing.     2+ dorsalis pedis pulses bilaterally    Pain over  r heel posteriorly and laterally no mass    Mildly red 1/2cm ext hemorrhoid slightly firm a few ext hemorrhoids  L max sinus pressure      Assessment & Plan   1. Hypothyroidism, unspecified type     2. Mixed hyperlipidemia     3. Coronary artery disease, angina presence unspecified, unspecified vessel or lesion type, unspecified whether native or transplanted heart     4. Anxiety  zolpidem (AMBIEN) 10 MG tablet    ALPRAZolam (XANAX) 0.5 MG tablet   5. Depression, unspecified depression type     6. Heel pain, chronic, right  XR CALCANEUS RIGHT (MIN 2 VIEWS)    [place], [place]     No orders of the defined types were placed in this encounter. Orders Placed This Encounter   Medications    zolpidem (AMBIEN) 10 MG tablet     Sig: TAKE 1 TABLET BY MOUTH NIGHTLY AS NEEDED for sleep. Dispense:  30 tablet     Refill:  2    ALPRAZolam (XANAX) 0.5 MG tablet     Sig: Take 1 tablet by mouth 2 times daily as needed for Anxiety for up to 30 days. Dispense:  60 tablet     Refill:  2     Medications Discontinued During This Encounter   Medication Reason    zolpidem (AMBIEN) 10 MG tablet Reorder    ALPRAZolam (XANAX) 0.5 MG tablet Reorder     No Follow-up on file. To dr Brent Tenorio  To podiatry  [facility]  The patient was reminded that an antibiotic has been prescribed the predicted course is improvement to cure with no persistent issues. Take antibiotics as directed. If any problems occur, an appointment should be made or ER visit if severe. Because of the risk with ANY antibiotic of C. Difficile colitis if persistent diarrhea or abdominal pain or any concerning symptoms, we should be notified. To reduce this risk, a probiotic pill, yogurt or other preparations containing active cultures should be ingested daily -particularly while on the antibiotic. If any persistent symptoms of illness, follow up appointment should be made in a timely fashion with a physician.   Add humidity to bedroom x 2wks    Tuan Cotter MD

## 2018-01-18 DIAGNOSIS — E03.9 HYPOTHYROIDISM, UNSPECIFIED TYPE: Primary | ICD-10-CM

## 2018-01-18 RX ORDER — LEVOTHYROXINE SODIUM 0.07 MG/1
75 TABLET ORAL DAILY
Qty: 90 TABLET | Refills: 1 | Status: SHIPPED | OUTPATIENT
Start: 2018-01-18 | End: 2018-07-05 | Stop reason: SDUPTHER

## 2018-01-18 RX ORDER — LEVOTHYROXINE SODIUM 0.07 MG/1
75 TABLET ORAL DAILY
Qty: 30 TABLET | Refills: 0 | Status: SHIPPED | OUTPATIENT
Start: 2018-01-18 | End: 2018-01-18 | Stop reason: SDUPTHER

## 2018-01-22 RX ORDER — LEVOTHYROXINE SODIUM 0.05 MG/1
TABLET ORAL
Qty: 90 TABLET | Refills: 1 | Status: SHIPPED | OUTPATIENT
Start: 2018-01-22 | End: 2018-01-29

## 2018-01-29 ENCOUNTER — OFFICE VISIT (OUTPATIENT)
Dept: PODIATRY | Age: 58
End: 2018-01-29
Payer: COMMERCIAL

## 2018-01-29 VITALS
HEART RATE: 83 BPM | RESPIRATION RATE: 16 BRPM | SYSTOLIC BLOOD PRESSURE: 122 MMHG | HEIGHT: 62 IN | DIASTOLIC BLOOD PRESSURE: 72 MMHG | OXYGEN SATURATION: 96 % | BODY MASS INDEX: 32.76 KG/M2 | WEIGHT: 178 LBS | TEMPERATURE: 98.3 F

## 2018-01-29 DIAGNOSIS — M79.672 PAIN OF LEFT HEEL: Primary | ICD-10-CM

## 2018-01-29 PROCEDURE — 3017F COLORECTAL CA SCREEN DOC REV: CPT | Performed by: PODIATRIST

## 2018-01-29 PROCEDURE — G8484 FLU IMMUNIZE NO ADMIN: HCPCS | Performed by: PODIATRIST

## 2018-01-29 PROCEDURE — G8427 DOCREV CUR MEDS BY ELIG CLIN: HCPCS | Performed by: PODIATRIST

## 2018-01-29 PROCEDURE — 1036F TOBACCO NON-USER: CPT | Performed by: PODIATRIST

## 2018-01-29 PROCEDURE — 99203 OFFICE O/P NEW LOW 30 MIN: CPT | Performed by: PODIATRIST

## 2018-01-29 PROCEDURE — 3014F SCREEN MAMMO DOC REV: CPT | Performed by: PODIATRIST

## 2018-01-29 PROCEDURE — G8598 ASA/ANTIPLAT THER USED: HCPCS | Performed by: PODIATRIST

## 2018-01-29 PROCEDURE — G8417 CALC BMI ABV UP PARAM F/U: HCPCS | Performed by: PODIATRIST

## 2018-01-29 RX ORDER — MELOXICAM 15 MG/1
15 TABLET ORAL DAILY
Qty: 30 TABLET | Refills: 3 | Status: SHIPPED | OUTPATIENT
Start: 2018-01-29 | End: 2018-09-04 | Stop reason: ALTCHOICE

## 2018-01-29 RX ORDER — METHYLPREDNISOLONE 4 MG/1
TABLET ORAL
Qty: 1 KIT | Refills: 0 | Status: SHIPPED | OUTPATIENT
Start: 2018-01-29 | End: 2018-02-04

## 2018-02-12 ENCOUNTER — OFFICE VISIT (OUTPATIENT)
Dept: PODIATRY | Age: 58
End: 2018-02-12
Payer: COMMERCIAL

## 2018-02-12 VITALS
SYSTOLIC BLOOD PRESSURE: 106 MMHG | TEMPERATURE: 97.5 F | HEART RATE: 83 BPM | DIASTOLIC BLOOD PRESSURE: 68 MMHG | RESPIRATION RATE: 14 BRPM | WEIGHT: 177 LBS | HEIGHT: 63 IN | BODY MASS INDEX: 31.36 KG/M2 | OXYGEN SATURATION: 98 %

## 2018-02-12 DIAGNOSIS — M72.2 PLANTAR FASCIITIS, RIGHT: Primary | ICD-10-CM

## 2018-02-12 DIAGNOSIS — M76.62 ACHILLES TENDINITIS OF LEFT LOWER EXTREMITY: ICD-10-CM

## 2018-02-12 DIAGNOSIS — M24.573 EQUINUS CONTRACTURE OF ANKLE: ICD-10-CM

## 2018-02-12 PROCEDURE — 3017F COLORECTAL CA SCREEN DOC REV: CPT | Performed by: PODIATRIST

## 2018-02-12 PROCEDURE — G8417 CALC BMI ABV UP PARAM F/U: HCPCS | Performed by: PODIATRIST

## 2018-02-12 PROCEDURE — 99213 OFFICE O/P EST LOW 20 MIN: CPT | Performed by: PODIATRIST

## 2018-02-12 PROCEDURE — 3014F SCREEN MAMMO DOC REV: CPT | Performed by: PODIATRIST

## 2018-02-12 PROCEDURE — G8484 FLU IMMUNIZE NO ADMIN: HCPCS | Performed by: PODIATRIST

## 2018-02-12 PROCEDURE — G8427 DOCREV CUR MEDS BY ELIG CLIN: HCPCS | Performed by: PODIATRIST

## 2018-02-12 PROCEDURE — G8598 ASA/ANTIPLAT THER USED: HCPCS | Performed by: PODIATRIST

## 2018-02-12 PROCEDURE — 1036F TOBACCO NON-USER: CPT | Performed by: PODIATRIST

## 2018-02-27 NOTE — PROGRESS NOTES
(NITROSTAT) 0.4 MG SL tablet Place 0.4 mg under the tongue every 5 minutes as needed. No current facility-administered medications on file prior to visit. Allergies   Allergen Reactions    Codeine Other (See Comments)     hallucinations    Lipitor [Atorvastatin] Other (See Comments)     Muscle & joint pain     Social History        She has a physical job at [employer]. She lives with her . She has never been a smoker. Family History   Problem Relation Age of Onset    Mental Illness Mother     Alcohol Abuse Father     Arthritis Sister     Mental Illness Brother      Review of Systems She has no other complaints. She states that her BMs are regular but tend to be soft to runny. The etiology of the soft to runny stools is unknown. She has used pro biotics without improvement. Objective:   Physical Exam  She does not appear ill or toxic. Breath sounds are clear. The cardiac exam is normal. The belly is not distended. There are fleshy tags circumferentially with a moderate sized polyp noted on the anterior left side. HONORIO shows normal tone. There are no rectal masses. Anoscopy was done and showed mild to moderate hemorrhoids. There was no gross blood on the scope or my digit. I reviewed the report of a colonoscopy done 7/2017 per Dr Colleen Lara at [facility]. Random biopsies were done. Grade 2 internal hemorrhoids were noted. Assessment:      Rectal pain. Anal polyp on exam.  Mild to moderate hemorrhoids on exam.   Chronic diarrhea which probably aggravates her hemorrhoids. Plan:     I discussed doing an office procedure which would be simple excision of the anal polyp. She was not sure she could tolerate this. I discussed the role of exam under anesthesia with excision of the anal polyp. I anticipate doing rigid sigmoidoscopy and anoscopy for evaluation of the rectum.  I discussed excision and rubber banding as possible options for management internal

## 2018-03-02 ENCOUNTER — OFFICE VISIT (OUTPATIENT)
Dept: SURGERY | Age: 58
End: 2018-03-02
Payer: COMMERCIAL

## 2018-03-02 VITALS
SYSTOLIC BLOOD PRESSURE: 118 MMHG | BODY MASS INDEX: 31.36 KG/M2 | HEIGHT: 63 IN | TEMPERATURE: 98 F | WEIGHT: 177 LBS | DIASTOLIC BLOOD PRESSURE: 82 MMHG

## 2018-03-02 DIAGNOSIS — K62.0 ANAL POLYP: ICD-10-CM

## 2018-03-02 DIAGNOSIS — K62.89 RECTAL PAIN: Primary | ICD-10-CM

## 2018-03-02 DIAGNOSIS — K62.5 RECTAL BLEEDING: ICD-10-CM

## 2018-03-02 PROCEDURE — 3017F COLORECTAL CA SCREEN DOC REV: CPT | Performed by: SURGERY

## 2018-03-02 PROCEDURE — G8417 CALC BMI ABV UP PARAM F/U: HCPCS | Performed by: SURGERY

## 2018-03-02 PROCEDURE — G8427 DOCREV CUR MEDS BY ELIG CLIN: HCPCS | Performed by: SURGERY

## 2018-03-02 PROCEDURE — 3014F SCREEN MAMMO DOC REV: CPT | Performed by: SURGERY

## 2018-03-02 PROCEDURE — 1036F TOBACCO NON-USER: CPT | Performed by: SURGERY

## 2018-03-02 PROCEDURE — G8598 ASA/ANTIPLAT THER USED: HCPCS | Performed by: SURGERY

## 2018-03-02 PROCEDURE — G8484 FLU IMMUNIZE NO ADMIN: HCPCS | Performed by: SURGERY

## 2018-03-02 PROCEDURE — 99201 PR OFFICE OUTPATIENT NEW 10 MINUTES: CPT | Performed by: SURGERY

## 2018-03-02 RX ORDER — ZOLPIDEM TARTRATE 10 MG/1
TABLET ORAL
Refills: 2 | COMMUNITY
Start: 2018-02-17 | End: 2018-04-10 | Stop reason: SDUPTHER

## 2018-03-03 PROBLEM — K62.5 RECTAL BLEEDING: Status: ACTIVE | Noted: 2018-03-03

## 2018-03-03 PROBLEM — K62.89 RECTAL PAIN: Status: ACTIVE | Noted: 2018-03-03

## 2018-03-03 PROBLEM — K62.0 ANAL POLYP: Status: ACTIVE | Noted: 2018-03-03

## 2018-04-10 ENCOUNTER — CLINICAL DOCUMENTATION (OUTPATIENT)
Dept: FAMILY MEDICINE CLINIC | Age: 58
End: 2018-04-10

## 2018-04-10 RX ORDER — ZOLPIDEM TARTRATE 10 MG/1
TABLET ORAL
Qty: 30 TABLET | Refills: 5 | Status: SHIPPED | OUTPATIENT
Start: 2018-04-10 | End: 2018-05-10

## 2018-04-12 ENCOUNTER — HOSPITAL ENCOUNTER (OUTPATIENT)
Dept: PREADMISSION TESTING | Age: 58
Discharge: HOME | End: 2018-04-16
Payer: COMMERCIAL

## 2018-04-12 VITALS
HEIGHT: 62 IN | WEIGHT: 178.4 LBS | BODY MASS INDEX: 32.83 KG/M2 | HEART RATE: 86 BPM | OXYGEN SATURATION: 97 % | DIASTOLIC BLOOD PRESSURE: 67 MMHG | SYSTOLIC BLOOD PRESSURE: 129 MMHG | TEMPERATURE: 98.4 F | RESPIRATION RATE: 16 BRPM

## 2018-04-12 LAB
ANION GAP SERPL CALCULATED.3IONS-SCNC: 12 MEQ/L (ref 7–13)
BUN BLDV-MCNC: 15 MG/DL (ref 6–20)
CALCIUM SERPL-MCNC: 9.6 MG/DL (ref 8.6–10.2)
CHLORIDE BLD-SCNC: 102 MEQ/L (ref 98–107)
CO2: 27 MEQ/L (ref 22–29)
CREAT SERPL-MCNC: 0.53 MG/DL (ref 0.5–0.9)
EKG ATRIAL RATE: 80 BPM
EKG P AXIS: 46 DEGREES
EKG P-R INTERVAL: 126 MS
EKG Q-T INTERVAL: 360 MS
EKG QRS DURATION: 78 MS
EKG QTC CALCULATION (BAZETT): 415 MS
EKG R AXIS: 7 DEGREES
EKG T AXIS: 36 DEGREES
EKG VENTRICULAR RATE: 80 BPM
GFR AFRICAN AMERICAN: >60
GFR NON-AFRICAN AMERICAN: >60
GLUCOSE BLD-MCNC: 96 MG/DL (ref 74–109)
HCT VFR BLD CALC: 40.6 % (ref 37–47)
HEMOGLOBIN: 13.6 G/DL (ref 12–16)
MCH RBC QN AUTO: 31.9 PG (ref 27–31.3)
MCHC RBC AUTO-ENTMCNC: 33.5 % (ref 33–37)
MCV RBC AUTO: 95.1 FL (ref 82–100)
PDW BLD-RTO: 12.7 % (ref 11.5–14.5)
PLATELET # BLD: 228 K/UL (ref 130–400)
POTASSIUM SERPL-SCNC: 4 MEQ/L (ref 3.5–5.1)
RBC # BLD: 4.27 M/UL (ref 4.2–5.4)
SODIUM BLD-SCNC: 141 MEQ/L (ref 132–144)
WBC # BLD: 5 K/UL (ref 4.8–10.8)

## 2018-04-12 PROCEDURE — 85027 COMPLETE CBC AUTOMATED: CPT

## 2018-04-12 PROCEDURE — 93005 ELECTROCARDIOGRAM TRACING: CPT

## 2018-04-12 PROCEDURE — 80048 BASIC METABOLIC PNL TOTAL CA: CPT

## 2018-04-12 RX ORDER — LIDOCAINE HYDROCHLORIDE 10 MG/ML
1 INJECTION, SOLUTION EPIDURAL; INFILTRATION; INTRACAUDAL; PERINEURAL
Status: CANCELLED | OUTPATIENT
Start: 2018-04-12 | End: 2018-04-12

## 2018-04-12 RX ORDER — SODIUM CHLORIDE 0.9 % (FLUSH) 0.9 %
10 SYRINGE (ML) INJECTION PRN
Status: CANCELLED | OUTPATIENT
Start: 2018-04-12

## 2018-04-12 RX ORDER — SODIUM CHLORIDE, SODIUM LACTATE, POTASSIUM CHLORIDE, CALCIUM CHLORIDE 600; 310; 30; 20 MG/100ML; MG/100ML; MG/100ML; MG/100ML
INJECTION, SOLUTION INTRAVENOUS CONTINUOUS
Status: CANCELLED | OUTPATIENT
Start: 2018-04-19

## 2018-04-12 RX ORDER — SODIUM CHLORIDE 0.9 % (FLUSH) 0.9 %
10 SYRINGE (ML) INJECTION EVERY 12 HOURS SCHEDULED
Status: CANCELLED | OUTPATIENT
Start: 2018-04-12

## 2018-04-12 RX ORDER — SODIUM CHLORIDE, SODIUM LACTATE, POTASSIUM CHLORIDE, CALCIUM CHLORIDE 600; 310; 30; 20 MG/100ML; MG/100ML; MG/100ML; MG/100ML
INJECTION, SOLUTION INTRAVENOUS CONTINUOUS
Status: CANCELLED | OUTPATIENT
Start: 2018-04-12

## 2018-04-13 PROCEDURE — 93010 ELECTROCARDIOGRAM REPORT: CPT | Performed by: INTERNAL MEDICINE

## 2018-04-19 ENCOUNTER — HOSPITAL ENCOUNTER (OUTPATIENT)
Age: 58
Setting detail: OUTPATIENT SURGERY
Discharge: HOME | End: 2018-04-19
Payer: COMMERCIAL

## 2018-04-19 ENCOUNTER — ANESTHESIA EVENT (OUTPATIENT)
Dept: OPERATING ROOM | Age: 58
End: 2018-04-19
Payer: COMMERCIAL

## 2018-04-19 ENCOUNTER — ANESTHESIA (OUTPATIENT)
Dept: OPERATING ROOM | Age: 58
End: 2018-04-19
Payer: COMMERCIAL

## 2018-04-19 VITALS — TEMPERATURE: 96.1 F | DIASTOLIC BLOOD PRESSURE: 69 MMHG | OXYGEN SATURATION: 91 % | SYSTOLIC BLOOD PRESSURE: 122 MMHG

## 2018-04-19 VITALS
OXYGEN SATURATION: 100 % | WEIGHT: 178.38 LBS | HEART RATE: 77 BPM | HEIGHT: 62 IN | RESPIRATION RATE: 14 BRPM | BODY MASS INDEX: 32.82 KG/M2 | SYSTOLIC BLOOD PRESSURE: 136 MMHG | DIASTOLIC BLOOD PRESSURE: 62 MMHG | TEMPERATURE: 98 F

## 2018-04-19 DIAGNOSIS — G89.18 POST-OP PAIN: ICD-10-CM

## 2018-04-19 DIAGNOSIS — K62.0 ANAL POLYP: Primary | ICD-10-CM

## 2018-04-19 PROCEDURE — 7100000001 HC PACU RECOVERY - ADDTL 15 MIN: Performed by: SURGERY

## 2018-04-19 PROCEDURE — 2500000003 HC RX 250 WO HCPCS

## 2018-04-19 PROCEDURE — 7100000011 HC PHASE II RECOVERY - ADDTL 15 MIN: Performed by: SURGERY

## 2018-04-19 PROCEDURE — 2500000003 HC RX 250 WO HCPCS: Performed by: NURSE ANESTHETIST, CERTIFIED REGISTERED

## 2018-04-19 PROCEDURE — 2580000003 HC RX 258: Performed by: SURGERY

## 2018-04-19 PROCEDURE — 6360000002 HC RX W HCPCS: Performed by: NURSE ANESTHETIST, CERTIFIED REGISTERED

## 2018-04-19 PROCEDURE — 6370000000 HC RX 637 (ALT 250 FOR IP): Performed by: SURGERY

## 2018-04-19 PROCEDURE — 7100000010 HC PHASE II RECOVERY - FIRST 15 MIN: Performed by: SURGERY

## 2018-04-19 PROCEDURE — 46260 REMOVE IN/EX HEM GROUPS 2+: CPT | Performed by: SURGERY

## 2018-04-19 PROCEDURE — 3700000000 HC ANESTHESIA ATTENDED CARE: Performed by: SURGERY

## 2018-04-19 PROCEDURE — 2500000003 HC RX 250 WO HCPCS: Performed by: SURGERY

## 2018-04-19 PROCEDURE — 3600000013 HC SURGERY LEVEL 3 ADDTL 15MIN: Performed by: SURGERY

## 2018-04-19 PROCEDURE — 7100000000 HC PACU RECOVERY - FIRST 15 MIN: Performed by: SURGERY

## 2018-04-19 PROCEDURE — 2580000003 HC RX 258

## 2018-04-19 PROCEDURE — 3600000003 HC SURGERY LEVEL 3 BASE: Performed by: SURGERY

## 2018-04-19 PROCEDURE — 2580000003 HC RX 258: Performed by: NURSE PRACTITIONER

## 2018-04-19 PROCEDURE — 6360000002 HC RX W HCPCS: Performed by: NURSE PRACTITIONER

## 2018-04-19 PROCEDURE — 3700000001 HC ADD 15 MINUTES (ANESTHESIA): Performed by: SURGERY

## 2018-04-19 RX ORDER — SODIUM CHLORIDE, SODIUM LACTATE, POTASSIUM CHLORIDE, CALCIUM CHLORIDE 600; 310; 30; 20 MG/100ML; MG/100ML; MG/100ML; MG/100ML
INJECTION, SOLUTION INTRAVENOUS
Status: COMPLETED
Start: 2018-04-19 | End: 2018-04-19

## 2018-04-19 RX ORDER — HYDROCODONE BITARTRATE AND ACETAMINOPHEN 5; 325 MG/1; MG/1
1 TABLET ORAL EVERY 4 HOURS PRN
Status: DISCONTINUED | OUTPATIENT
Start: 2018-04-19 | End: 2018-04-19 | Stop reason: HOSPADM

## 2018-04-19 RX ORDER — HYDROCODONE BITARTRATE AND ACETAMINOPHEN 5; 325 MG/1; MG/1
1 TABLET ORAL EVERY 6 HOURS PRN
Qty: 28 TABLET | Refills: 0 | Status: SHIPPED | OUTPATIENT
Start: 2018-04-19 | End: 2018-04-26

## 2018-04-19 RX ORDER — FENTANYL CITRATE 50 UG/ML
50 INJECTION, SOLUTION INTRAMUSCULAR; INTRAVENOUS EVERY 10 MIN PRN
Status: DISCONTINUED | OUTPATIENT
Start: 2018-04-19 | End: 2018-04-19 | Stop reason: HOSPADM

## 2018-04-19 RX ORDER — SODIUM CHLORIDE 0.9 % (FLUSH) 0.9 %
10 SYRINGE (ML) INJECTION EVERY 12 HOURS SCHEDULED
Status: CANCELLED | OUTPATIENT
Start: 2018-04-19

## 2018-04-19 RX ORDER — DEXAMETHASONE SODIUM PHOSPHATE 10 MG/ML
INJECTION INTRAMUSCULAR; INTRAVENOUS PRN
Status: DISCONTINUED | OUTPATIENT
Start: 2018-04-19 | End: 2018-04-19 | Stop reason: SDUPTHER

## 2018-04-19 RX ORDER — SODIUM CHLORIDE 0.9 % (FLUSH) 0.9 %
10 SYRINGE (ML) INJECTION PRN
Status: DISCONTINUED | OUTPATIENT
Start: 2018-04-19 | End: 2018-04-19 | Stop reason: HOSPADM

## 2018-04-19 RX ORDER — SODIUM CHLORIDE, SODIUM LACTATE, POTASSIUM CHLORIDE, CALCIUM CHLORIDE 600; 310; 30; 20 MG/100ML; MG/100ML; MG/100ML; MG/100ML
INJECTION, SOLUTION INTRAVENOUS CONTINUOUS
Status: DISCONTINUED | OUTPATIENT
Start: 2018-04-19 | End: 2018-04-19 | Stop reason: HOSPADM

## 2018-04-19 RX ORDER — METOCLOPRAMIDE HYDROCHLORIDE 5 MG/ML
10 INJECTION INTRAMUSCULAR; INTRAVENOUS
Status: DISCONTINUED | OUTPATIENT
Start: 2018-04-19 | End: 2018-04-19 | Stop reason: HOSPADM

## 2018-04-19 RX ORDER — MIDAZOLAM HYDROCHLORIDE 1 MG/ML
INJECTION INTRAMUSCULAR; INTRAVENOUS PRN
Status: DISCONTINUED | OUTPATIENT
Start: 2018-04-19 | End: 2018-04-19 | Stop reason: SDUPTHER

## 2018-04-19 RX ORDER — PROPOFOL 10 MG/ML
INJECTION, EMULSION INTRAVENOUS PRN
Status: DISCONTINUED | OUTPATIENT
Start: 2018-04-19 | End: 2018-04-19 | Stop reason: SDUPTHER

## 2018-04-19 RX ORDER — SODIUM CHLORIDE 0.9 % (FLUSH) 0.9 %
10 SYRINGE (ML) INJECTION PRN
Status: CANCELLED | OUTPATIENT
Start: 2018-04-19

## 2018-04-19 RX ORDER — LIDOCAINE HYDROCHLORIDE 10 MG/ML
1 INJECTION, SOLUTION EPIDURAL; INFILTRATION; INTRACAUDAL; PERINEURAL
Status: COMPLETED | OUTPATIENT
Start: 2018-04-19 | End: 2018-04-19

## 2018-04-19 RX ORDER — LIDOCAINE HYDROCHLORIDE 10 MG/ML
INJECTION, SOLUTION EPIDURAL; INFILTRATION; INTRACAUDAL; PERINEURAL
Status: COMPLETED
Start: 2018-04-19 | End: 2018-04-19

## 2018-04-19 RX ORDER — HYDROCODONE BITARTRATE AND ACETAMINOPHEN 5; 325 MG/1; MG/1
2 TABLET ORAL PRN
Status: DISCONTINUED | OUTPATIENT
Start: 2018-04-19 | End: 2018-04-19 | Stop reason: HOSPADM

## 2018-04-19 RX ORDER — KETOROLAC TROMETHAMINE 30 MG/ML
INJECTION, SOLUTION INTRAMUSCULAR; INTRAVENOUS PRN
Status: DISCONTINUED | OUTPATIENT
Start: 2018-04-19 | End: 2018-04-19 | Stop reason: SDUPTHER

## 2018-04-19 RX ORDER — MAGNESIUM HYDROXIDE 1200 MG/15ML
LIQUID ORAL CONTINUOUS PRN
Status: DISCONTINUED | OUTPATIENT
Start: 2018-04-19 | End: 2018-04-19 | Stop reason: HOSPADM

## 2018-04-19 RX ORDER — ONDANSETRON 2 MG/ML
4 INJECTION INTRAMUSCULAR; INTRAVENOUS
Status: DISCONTINUED | OUTPATIENT
Start: 2018-04-19 | End: 2018-04-19 | Stop reason: HOSPADM

## 2018-04-19 RX ORDER — BUPIVACAINE HYDROCHLORIDE 5 MG/ML
INJECTION, SOLUTION EPIDURAL; INTRACAUDAL PRN
Status: DISCONTINUED | OUTPATIENT
Start: 2018-04-19 | End: 2018-04-19 | Stop reason: HOSPADM

## 2018-04-19 RX ORDER — SODIUM CHLORIDE 0.9 % (FLUSH) 0.9 %
10 SYRINGE (ML) INJECTION EVERY 12 HOURS SCHEDULED
Status: DISCONTINUED | OUTPATIENT
Start: 2018-04-19 | End: 2018-04-19 | Stop reason: HOSPADM

## 2018-04-19 RX ORDER — ONDANSETRON 2 MG/ML
4 INJECTION INTRAMUSCULAR; INTRAVENOUS EVERY 6 HOURS PRN
Status: DISCONTINUED | OUTPATIENT
Start: 2018-04-19 | End: 2018-04-19 | Stop reason: HOSPADM

## 2018-04-19 RX ORDER — MEPERIDINE HYDROCHLORIDE 25 MG/ML
12.5 INJECTION INTRAMUSCULAR; INTRAVENOUS; SUBCUTANEOUS EVERY 5 MIN PRN
Status: DISCONTINUED | OUTPATIENT
Start: 2018-04-19 | End: 2018-04-19 | Stop reason: HOSPADM

## 2018-04-19 RX ORDER — LIDOCAINE HYDROCHLORIDE 20 MG/ML
INJECTION, SOLUTION INFILTRATION; PERINEURAL PRN
Status: DISCONTINUED | OUTPATIENT
Start: 2018-04-19 | End: 2018-04-19 | Stop reason: SDUPTHER

## 2018-04-19 RX ORDER — ONDANSETRON 2 MG/ML
INJECTION INTRAMUSCULAR; INTRAVENOUS PRN
Status: DISCONTINUED | OUTPATIENT
Start: 2018-04-19 | End: 2018-04-19 | Stop reason: SDUPTHER

## 2018-04-19 RX ORDER — DIPHENHYDRAMINE HYDROCHLORIDE 50 MG/ML
12.5 INJECTION INTRAMUSCULAR; INTRAVENOUS
Status: DISCONTINUED | OUTPATIENT
Start: 2018-04-19 | End: 2018-04-19 | Stop reason: HOSPADM

## 2018-04-19 RX ORDER — FENTANYL CITRATE 50 UG/ML
INJECTION, SOLUTION INTRAMUSCULAR; INTRAVENOUS PRN
Status: DISCONTINUED | OUTPATIENT
Start: 2018-04-19 | End: 2018-04-19 | Stop reason: SDUPTHER

## 2018-04-19 RX ORDER — HYDROCODONE BITARTRATE AND ACETAMINOPHEN 5; 325 MG/1; MG/1
1 TABLET ORAL PRN
Status: DISCONTINUED | OUTPATIENT
Start: 2018-04-19 | End: 2018-04-19 | Stop reason: HOSPADM

## 2018-04-19 RX ORDER — ACETAMINOPHEN 325 MG/1
650 TABLET ORAL EVERY 4 HOURS PRN
Status: DISCONTINUED | OUTPATIENT
Start: 2018-04-19 | End: 2018-04-19 | Stop reason: HOSPADM

## 2018-04-19 RX ORDER — ACETAMINOPHEN 650 MG/1
650 SUPPOSITORY RECTAL EVERY 4 HOURS PRN
Status: DISCONTINUED | OUTPATIENT
Start: 2018-04-19 | End: 2018-04-19 | Stop reason: HOSPADM

## 2018-04-19 RX ORDER — HYDROCODONE BITARTRATE AND ACETAMINOPHEN 5; 325 MG/1; MG/1
2 TABLET ORAL EVERY 4 HOURS PRN
Status: DISCONTINUED | OUTPATIENT
Start: 2018-04-19 | End: 2018-04-19 | Stop reason: HOSPADM

## 2018-04-19 RX ADMIN — ONDANSETRON 4 MG: 2 INJECTION INTRAMUSCULAR; INTRAVENOUS at 10:00

## 2018-04-19 RX ADMIN — KETOROLAC TROMETHAMINE 30 MG: 30 INJECTION, SOLUTION INTRAMUSCULAR; INTRAVENOUS at 10:20

## 2018-04-19 RX ADMIN — CEFOXITIN SODIUM 2 G: 2 POWDER, FOR SOLUTION INTRAVENOUS at 10:00

## 2018-04-19 RX ADMIN — DEXAMETHASONE SODIUM PHOSPHATE 4 MG: 10 INJECTION INTRAMUSCULAR; INTRAVENOUS at 10:01

## 2018-04-19 RX ADMIN — HYDROCODONE BITARTRATE AND ACETAMINOPHEN 1 TABLET: 5; 325 TABLET ORAL at 11:22

## 2018-04-19 RX ADMIN — MIDAZOLAM HYDROCHLORIDE 2 MG: 1 INJECTION, SOLUTION INTRAMUSCULAR; INTRAVENOUS at 09:52

## 2018-04-19 RX ADMIN — SODIUM CHLORIDE, POTASSIUM CHLORIDE, SODIUM LACTATE AND CALCIUM CHLORIDE 1000 ML: 600; 310; 30; 20 INJECTION, SOLUTION INTRAVENOUS at 07:50

## 2018-04-19 RX ADMIN — SODIUM CHLORIDE, SODIUM LACTATE, POTASSIUM CHLORIDE, CALCIUM CHLORIDE 1000 ML: 600; 310; 30; 20 INJECTION, SOLUTION INTRAVENOUS at 07:50

## 2018-04-19 RX ADMIN — LIDOCAINE HYDROCHLORIDE 100 MG: 20 INJECTION, SOLUTION INFILTRATION; PERINEURAL at 09:56

## 2018-04-19 RX ADMIN — FENTANYL CITRATE 50 MCG: 50 INJECTION, SOLUTION INTRAMUSCULAR; INTRAVENOUS at 09:56

## 2018-04-19 RX ADMIN — LIDOCAINE HYDROCHLORIDE 1 ML: 10 INJECTION, SOLUTION EPIDURAL; INFILTRATION; INTRACAUDAL; PERINEURAL at 07:49

## 2018-04-19 RX ADMIN — FENTANYL CITRATE 50 MCG: 50 INJECTION, SOLUTION INTRAMUSCULAR; INTRAVENOUS at 10:10

## 2018-04-19 RX ADMIN — PROPOFOL 200 MG: 10 INJECTION, EMULSION INTRAVENOUS at 09:56

## 2018-04-19 ASSESSMENT — PULMONARY FUNCTION TESTS
PIF_VALUE: 13
PIF_VALUE: 13
PIF_VALUE: 2
PIF_VALUE: 2
PIF_VALUE: 13
PIF_VALUE: 14
PIF_VALUE: 13
PIF_VALUE: 2
PIF_VALUE: 1
PIF_VALUE: 13
PIF_VALUE: 0
PIF_VALUE: 1
PIF_VALUE: 13
PIF_VALUE: 13
PIF_VALUE: 1
PIF_VALUE: 13
PIF_VALUE: 1
PIF_VALUE: 13
PIF_VALUE: 3
PIF_VALUE: 4
PIF_VALUE: 10
PIF_VALUE: 13
PIF_VALUE: 13
PIF_VALUE: 3
PIF_VALUE: 13
PIF_VALUE: 2

## 2018-04-19 ASSESSMENT — PAIN SCALES - GENERAL
PAINLEVEL_OUTOF10: 4
PAINLEVEL_OUTOF10: 0
PAINLEVEL_OUTOF10: 0
PAINLEVEL_OUTOF10: 4

## 2018-04-19 ASSESSMENT — PAIN - FUNCTIONAL ASSESSMENT: PAIN_FUNCTIONAL_ASSESSMENT: 0-10

## 2018-05-04 ENCOUNTER — OFFICE VISIT (OUTPATIENT)
Dept: SURGERY | Age: 58
End: 2018-05-04

## 2018-05-04 VITALS
SYSTOLIC BLOOD PRESSURE: 114 MMHG | HEART RATE: 100 BPM | DIASTOLIC BLOOD PRESSURE: 84 MMHG | WEIGHT: 175.6 LBS | BODY MASS INDEX: 32.12 KG/M2 | TEMPERATURE: 97.4 F | OXYGEN SATURATION: 98 %

## 2018-05-04 DIAGNOSIS — Z09 SURGICAL FOLLOWUP: Primary | ICD-10-CM

## 2018-05-04 PROBLEM — K62.0 ANAL POLYP: Status: RESOLVED | Noted: 2018-03-03 | Resolved: 2018-05-04

## 2018-05-04 PROCEDURE — 99024 POSTOP FOLLOW-UP VISIT: CPT | Performed by: SURGERY

## 2018-06-14 ENCOUNTER — OFFICE VISIT (OUTPATIENT)
Dept: FAMILY MEDICINE CLINIC | Age: 58
End: 2018-06-14
Payer: COMMERCIAL

## 2018-06-14 VITALS
OXYGEN SATURATION: 98 % | BODY MASS INDEX: 31.65 KG/M2 | WEIGHT: 172 LBS | RESPIRATION RATE: 16 BRPM | HEART RATE: 76 BPM | TEMPERATURE: 96.9 F | HEIGHT: 62 IN | SYSTOLIC BLOOD PRESSURE: 118 MMHG | DIASTOLIC BLOOD PRESSURE: 62 MMHG

## 2018-06-14 DIAGNOSIS — E03.9 HYPOTHYROIDISM, UNSPECIFIED TYPE: ICD-10-CM

## 2018-06-14 DIAGNOSIS — L71.9 ROSACEA: Primary | ICD-10-CM

## 2018-06-14 DIAGNOSIS — F32.A DEPRESSION, UNSPECIFIED DEPRESSION TYPE: ICD-10-CM

## 2018-06-14 LAB
T4 FREE: 1.05 NG/DL (ref 0.93–1.7)
TSH REFLEX: 0.75 UIU/ML (ref 0.27–4.2)

## 2018-06-14 PROCEDURE — G8417 CALC BMI ABV UP PARAM F/U: HCPCS | Performed by: INTERNAL MEDICINE

## 2018-06-14 PROCEDURE — 3017F COLORECTAL CA SCREEN DOC REV: CPT | Performed by: INTERNAL MEDICINE

## 2018-06-14 PROCEDURE — 99214 OFFICE O/P EST MOD 30 MIN: CPT | Performed by: INTERNAL MEDICINE

## 2018-06-14 PROCEDURE — G8427 DOCREV CUR MEDS BY ELIG CLIN: HCPCS | Performed by: INTERNAL MEDICINE

## 2018-06-14 PROCEDURE — 1036F TOBACCO NON-USER: CPT | Performed by: INTERNAL MEDICINE

## 2018-06-14 PROCEDURE — G8598 ASA/ANTIPLAT THER USED: HCPCS | Performed by: INTERNAL MEDICINE

## 2018-06-14 RX ORDER — METRONIDAZOLE 7.5 MG/G
GEL TOPICAL
Qty: 1 TUBE | Refills: 1 | Status: SHIPPED | OUTPATIENT
Start: 2018-06-14 | End: 2018-09-04 | Stop reason: ALTCHOICE

## 2018-06-14 RX ORDER — ZOLPIDEM TARTRATE 10 MG/1
TABLET ORAL
COMMUNITY
Start: 2018-06-12 | End: 2018-09-04 | Stop reason: SDUPTHER

## 2018-06-14 ASSESSMENT — ENCOUNTER SYMPTOMS
ABDOMINAL PAIN: 0
BACK PAIN: 0
SHORTNESS OF BREATH: 0
EYE PAIN: 0

## 2018-07-06 RX ORDER — LEVOTHYROXINE SODIUM 0.07 MG/1
TABLET ORAL
Qty: 90 TABLET | Refills: 1 | Status: SHIPPED | OUTPATIENT
Start: 2018-07-06 | End: 2019-01-01 | Stop reason: SDUPTHER

## 2018-07-27 ENCOUNTER — OFFICE VISIT (OUTPATIENT)
Dept: FAMILY MEDICINE CLINIC | Age: 58
End: 2018-07-27
Payer: COMMERCIAL

## 2018-07-27 VITALS
DIASTOLIC BLOOD PRESSURE: 70 MMHG | RESPIRATION RATE: 14 BRPM | OXYGEN SATURATION: 98 % | TEMPERATURE: 98 F | HEART RATE: 80 BPM | BODY MASS INDEX: 32.02 KG/M2 | WEIGHT: 174 LBS | SYSTOLIC BLOOD PRESSURE: 110 MMHG | HEIGHT: 62 IN

## 2018-07-27 DIAGNOSIS — F32.A DEPRESSION, UNSPECIFIED DEPRESSION TYPE: Primary | ICD-10-CM

## 2018-07-27 DIAGNOSIS — M26.609 TMJ (TEMPOROMANDIBULAR JOINT DISORDER): ICD-10-CM

## 2018-07-27 DIAGNOSIS — M25.542 ARTHRALGIA OF BOTH HANDS: ICD-10-CM

## 2018-07-27 DIAGNOSIS — E03.9 HYPOTHYROIDISM, UNSPECIFIED TYPE: ICD-10-CM

## 2018-07-27 DIAGNOSIS — M25.541 ARTHRALGIA OF BOTH HANDS: ICD-10-CM

## 2018-07-27 DIAGNOSIS — F41.9 ANXIETY: ICD-10-CM

## 2018-07-27 DIAGNOSIS — E78.2 MIXED HYPERLIPIDEMIA: ICD-10-CM

## 2018-07-27 DIAGNOSIS — L71.9 ROSACEA: ICD-10-CM

## 2018-07-27 LAB
CHOLESTEROL, TOTAL: 233 MG/DL (ref 0–199)
HDLC SERPL-MCNC: 48 MG/DL (ref 40–59)
LDL CHOLESTEROL CALCULATED: 129 MG/DL (ref 0–129)
RHEUMATOID FACTOR: <10 IU/ML (ref 0–14)
SEDIMENTATION RATE, ERYTHROCYTE: 4 MM (ref 0–30)
TRIGL SERPL-MCNC: 278 MG/DL (ref 0–200)
URIC ACID, SERUM: 4.6 MG/DL (ref 2.4–5.7)

## 2018-07-27 PROCEDURE — G8417 CALC BMI ABV UP PARAM F/U: HCPCS | Performed by: FAMILY MEDICINE

## 2018-07-27 PROCEDURE — G8598 ASA/ANTIPLAT THER USED: HCPCS | Performed by: FAMILY MEDICINE

## 2018-07-27 PROCEDURE — 3017F COLORECTAL CA SCREEN DOC REV: CPT | Performed by: FAMILY MEDICINE

## 2018-07-27 PROCEDURE — 1036F TOBACCO NON-USER: CPT | Performed by: FAMILY MEDICINE

## 2018-07-27 PROCEDURE — G8427 DOCREV CUR MEDS BY ELIG CLIN: HCPCS | Performed by: FAMILY MEDICINE

## 2018-07-27 PROCEDURE — 99214 OFFICE O/P EST MOD 30 MIN: CPT | Performed by: FAMILY MEDICINE

## 2018-07-27 NOTE — PROGRESS NOTES
education: N/A     Occupational History    Not on file. Social History Main Topics    Smoking status: Never Smoker    Smokeless tobacco: Never Used    Alcohol use No    Drug use: No    Sexual activity: Not on file     Other Topics Concern    Not on file     Social History Narrative    No narrative on file     Family History   Problem Relation Age of Onset    Mental Illness Mother     Alcohol Abuse Father     Arthritis Sister     Mental Illness Brother      Allergies   Allergen Reactions    Codeine Other (See Comments)     hallucinations    Lipitor [Atorvastatin] Other (See Comments)     Muscle & joint pain     Current Outpatient Prescriptions   Medication Sig Dispense Refill    levothyroxine (SYNTHROID) 75 MCG tablet TAKE 1 TABLET DAILY 90 tablet 1    zolpidem (AMBIEN) 10 MG tablet       metroNIDAZOLE (METROGEL) 0.75 % gel Apply topically 2 times daily. 1 Tube 1    meloxicam (MOBIC) 15 MG tablet Take 1 tablet by mouth daily 30 tablet 3    venlafaxine (EFFEXOR XR) 75 MG extended release capsule Take 1 capsule by mouth daily (Patient taking differently: Take 75 mg by mouth nightly ) 90 capsule 3    cetirizine (ZYRTEC ALLERGY) 10 MG tablet Take 1 tablet by mouth daily (Patient taking differently: Take 10 mg by mouth daily as needed ) 30 tablet 11    nitroGLYCERIN (NITROSTAT) 0.4 MG SL tablet Place 0.4 mg under the tongue every 5 minutes as needed. No current facility-administered medications for this visit. The patient denies any history of      seizures,             heart attack + KNOWN CAD        NO stroke. No chest pain, shortness of breath, paroxysmal nocturnal dyspnea. No nausea, vomiting, diarrhea, hematochezia or melena. No paresthesias or headaches. No dysuria, frequency or hematuria.       Last labs  Orders Only on 06/14/2018   Component Date Value Ref Range Status    TSH 06/14/2018 0.748  0.270 - 4.200 uIU/mL Final    T4 Free 06/14/2018 1.05  0.93 - 1.70 ng/dL Final     Health Maintenance   Topic Date Due    Hepatitis C screen  1960    HIV screen  04/18/1975    Shingles Vaccine (1 of 2 - 2 Dose Series) 04/18/2010    DTaP/Tdap/Td vaccine (1 - Tdap) 11/06/2018 (Originally 4/18/1979)    Flu vaccine (1) 11/06/2018 (Originally 9/1/2018)    TSH testing  06/14/2019    Breast cancer screen  09/20/2019    Lipid screen  07/10/2022    Colon cancer screen colonoscopy  07/24/2022       No results found for this visit on 07/27/18. Objective    Vitals:    07/27/18 1034   BP: 110/70   Pulse: 80   Resp: 14   Temp: 98 °F (36.7 °C)   SpO2: 98%   Weight: 174 lb (78.9 kg)   Height: 5' 2\" (1.575 m)       PHYSICAL EXAMINATION:        GENERAL:    The patient appears well nourished and well-developed,     Normal affect. Not appearing significantly anxious or depressed. No acute respiratory distress. Alert and oriented times 3. Skin:     No skin rashes. No concerning moles observed. Gait:    Normal gait. No ataxia. HEENT:  Normocephalic, atraumatic. Throat:  Pharynx is clear, no erythema/ edema or exudates   Ears:    TMs normal bilaterally. Canals and ears normal   Eyes:  Extraocular eye motions intact and pain free. Pupils reactive/equal    Sclerae and conjunctivae clear    NECK: No masses or adenopathy palpable. No carotid bruits heard. No asymmetry visible. No thyromegaly. RESPIRATORY:   Clear/ Equal breath sounds /No acute respiratory distress. No wheezes,rales, or rhonchi. No percussive abnormalities    HEART: Regular rhythm without murmur, rub or gallop. ABDOMEN:  Soft, non tender. No masses, guarding or rebound. Normo active bowel sounds. EXTREMITIES:  No edema in any extremity. No cyanosis or clubbing.     2+ dorsalis pedis pulses bilaterally    Mild malar rash over nose and cheeks-shows phone pic of more severe    L upper arm irreg ?moel vs dermatofibroma  3-4mm    Assessment & Plan    Diagnosis

## 2018-07-31 DIAGNOSIS — M48.07 STENOSIS OF LUMBOSACRAL SPINE: Primary | ICD-10-CM

## 2018-07-31 DIAGNOSIS — M43.17 ACQUIRED SPONDYLOLISTHESIS OF LUMBOSACRAL REGION: ICD-10-CM

## 2018-07-31 LAB
ANA INTERPRETATION: NORMAL
ANTI-NUCLEAR ANTIBODY (ANA): NEGATIVE

## 2018-07-31 NOTE — TELEPHONE ENCOUNTER
Pt was given sample of vimovo and would like to get an rx for that. Please order if possible and call her when done. Aston Amaral it is working very well for her.

## 2018-09-04 ENCOUNTER — OFFICE VISIT (OUTPATIENT)
Dept: FAMILY MEDICINE CLINIC | Age: 58
End: 2018-09-04
Payer: COMMERCIAL

## 2018-09-04 ENCOUNTER — CLINICAL DOCUMENTATION (OUTPATIENT)
Dept: FAMILY MEDICINE CLINIC | Age: 58
End: 2018-09-04

## 2018-09-04 VITALS
BODY MASS INDEX: 32.35 KG/M2 | OXYGEN SATURATION: 98 % | TEMPERATURE: 96.8 F | WEIGHT: 175.8 LBS | HEIGHT: 62 IN | DIASTOLIC BLOOD PRESSURE: 84 MMHG | HEART RATE: 104 BPM | RESPIRATION RATE: 15 BRPM | SYSTOLIC BLOOD PRESSURE: 128 MMHG

## 2018-09-04 DIAGNOSIS — E03.9 HYPOTHYROIDISM, UNSPECIFIED TYPE: ICD-10-CM

## 2018-09-04 DIAGNOSIS — F41.9 ANXIETY: ICD-10-CM

## 2018-09-04 DIAGNOSIS — L71.9 ROSACEA: ICD-10-CM

## 2018-09-04 DIAGNOSIS — E78.2 MIXED HYPERLIPIDEMIA: ICD-10-CM

## 2018-09-04 DIAGNOSIS — R20.8 OTHER DISTURBANCES OF SKIN SENSATION: ICD-10-CM

## 2018-09-04 DIAGNOSIS — L98.9 SKIN LESION: Primary | ICD-10-CM

## 2018-09-04 DIAGNOSIS — Z12.31 ENCOUNTER FOR SCREENING MAMMOGRAM FOR BREAST CANCER: ICD-10-CM

## 2018-09-04 PROCEDURE — G8417 CALC BMI ABV UP PARAM F/U: HCPCS | Performed by: FAMILY MEDICINE

## 2018-09-04 PROCEDURE — 1036F TOBACCO NON-USER: CPT | Performed by: FAMILY MEDICINE

## 2018-09-04 PROCEDURE — G8598 ASA/ANTIPLAT THER USED: HCPCS | Performed by: FAMILY MEDICINE

## 2018-09-04 PROCEDURE — G8427 DOCREV CUR MEDS BY ELIG CLIN: HCPCS | Performed by: FAMILY MEDICINE

## 2018-09-04 PROCEDURE — 3017F COLORECTAL CA SCREEN DOC REV: CPT | Performed by: FAMILY MEDICINE

## 2018-09-04 PROCEDURE — 11300 SHAVE SKIN LESION 0.5 CM/<: CPT | Performed by: FAMILY MEDICINE

## 2018-09-04 PROCEDURE — 99213 OFFICE O/P EST LOW 20 MIN: CPT | Performed by: FAMILY MEDICINE

## 2018-09-04 RX ORDER — ZOLPIDEM TARTRATE 10 MG/1
10 TABLET ORAL NIGHTLY PRN
Qty: 30 TABLET | Refills: 2 | Status: SHIPPED | OUTPATIENT
Start: 2018-09-04 | End: 2018-12-26 | Stop reason: SDUPTHER

## 2018-09-04 RX ORDER — ALPRAZOLAM 0.5 MG/1
0.5 TABLET ORAL 2 TIMES DAILY PRN
Qty: 60 TABLET | Refills: 2 | Status: SHIPPED | OUTPATIENT
Start: 2018-09-04 | End: 2018-10-04

## 2018-09-04 NOTE — PROGRESS NOTES
Social History    Marital status:      Spouse name: N/A    Number of children: N/A    Years of education: N/A     Social History Main Topics    Smoking status: Never Smoker    Smokeless tobacco: Never Used    Alcohol use No    Drug use: No    Sexual activity: Not Asked     Other Topics Concern    None     Social History Narrative    None     Orders Only on 07/27/2018   Component Date Value Ref Range Status    Uric Acid, Serum 07/27/2018 4.6  2.4 - 5.7 mg/dL Final    Rheumatoid Factor 07/27/2018 <10.0  0.0 - 14.0 IU/mL Final    Sed Rate 07/27/2018 4  0 - 30 mm Final    Cholesterol, Total 07/27/2018 233* 0 - 199 mg/dL Final    ATP III Cholesterol Classification is Borderline High.  Triglycerides 07/27/2018 278* 0 - 200 mg/dL Final    ATP III Triglycerides Classification is High.  HDL 07/27/2018 48  40 - 59 mg/dL Final    Comment: ATP III HDL Cholesterol Classification is Desirable. Expected Values:    Males:    >55 = No Risk            35-55 = Moderate Risk            <35 = High Risk    Females:  >65 = No Risk            45-65 = Moderate Risk            <45 = High Risk    NCEP Guidelines: Third Report May 2001  >59 = negative risk factor for CHD  <40 = major risk factor for CHD      LDL Calculated 07/27/2018 129  0 - 129 mg/dL Final    ATP III LDL Classification is Near Optimal.    DEVANG 07/27/2018 Negative   Final    DEVANG Interpretation 07/27/2018 see below   Final    Comment: DEVANG testing done in this laboratory uses a Hep-2 cell  substrate. Although a negative DEVANG with this substrate is  strong evidence against the presence of SLE, a negative DEVANG  does occur in approximately 3-5% of patients with otherwise  typical SLE. Many of the latter patients have anti-SSA (Ro)  and/or anti-SSB(La)antibodies. If still clinically suspicious of SLE and the DEVANG is negative,  consider repeating the test in 3-6 months, particularly if  the clinical course changes.     Sjogren's syndrome, scleroderma, rheumatoid arthritis, and  dermato/polymyositis cannot be excluded on the basis of a  negative DEVANG test.  Tests appropriate for those disorders  should be considered if the clinical history, symptoms, and  physical findings are consistent with these diagnoses. Anti-SSA and anti-SSB are helpful in the evaluation of  Sjogren's syndrome, anti-Scl-70 for scleroderma, and  anti-Sanna-1 for dermato/polymyositis. Orders Only on 06/14/2018   Component Date Value Ref Range Status    TSH 06/14/2018 0.748  0.270 - 4.200 uIU/mL Final    T4 Free 06/14/2018 1.05  0.93 - 1.70 ng/dL Final     Health Maintenance   Topic Date Due    Hepatitis C screen  1960    HIV screen  04/18/1975    DTaP/Tdap/Td vaccine (1 - Tdap) 11/06/2018 (Originally 4/18/1979)    Flu vaccine (1) 11/06/2018 (Originally 9/1/2018)    Shingles Vaccine (1 of 2 - 2 Dose Series) 03/04/2019 (Originally 4/18/2010)    TSH testing  06/14/2019    Breast cancer screen  09/20/2019    Colon cancer screen colonoscopy  07/24/2022    Lipid screen  07/27/2023       No other major complaints or concerns    TODAY'S TESTS  No results found for this visit on 09/04/18. OBJECTIVE:  Vitals:    09/04/18 1622   BP: 128/84   Pulse: 104   Resp: 15   Temp: 96.8 °F (36 °C)   TempSrc: Tympanic   SpO2: 98%   Weight: 175 lb 12.8 oz (79.7 kg)   Height: 5' 2\" (1.575 m)         irreg mole L upper arm vs irritated dermatofibroma      Patient fully consented for biopsy and discussed risks of bleeding,scarring, infection and recurrence. Patient prepped and draped in normal sterile fashion identified areas of concern for biopsy which were cleansed with alcohol. Patient was infiltrated with lidocaine with epinephrine. Estimated amount minimal ( 1-2 cc)    Shave biopsy performed and excellent hemostasis obtained with electrocautery. Wound dressed with bacitracin and bandage. Diagnosis Orders   1. Skin lesion     2.  Other disturbances of skin sensation     3. Mixed hyperlipidemia     4. Hypothyroidism, unspecified type     5. Anxiety  ALPRAZolam (XANAX) 0.5 MG tablet    zolpidem (AMBIEN) 10 MG tablet   6. Encounter for screening mammogram for breast cancer  JORGE DIGITAL SCREEN W CAD BILATERAL       The patient was instructed on wound care and signs or symptoms of infection and to call if these occur. If no results on pathology in the next month they are advised to call.

## 2018-09-05 DIAGNOSIS — R20.8 OTHER DISTURBANCES OF SKIN SENSATION: ICD-10-CM

## 2018-09-05 DIAGNOSIS — L98.9 SKIN LESION: ICD-10-CM

## 2018-10-01 ENCOUNTER — HOSPITAL ENCOUNTER (OUTPATIENT)
Dept: WOMENS IMAGING | Age: 58
Discharge: HOME | End: 2018-10-03
Payer: COMMERCIAL

## 2018-10-01 DIAGNOSIS — Z12.31 ENCOUNTER FOR SCREENING MAMMOGRAM FOR BREAST CANCER: ICD-10-CM

## 2018-10-08 ENCOUNTER — HOSPITAL ENCOUNTER (OUTPATIENT)
Dept: WOMENS IMAGING | Age: 58
Discharge: HOME | End: 2018-10-10
Payer: COMMERCIAL

## 2018-10-08 PROCEDURE — 77063 BREAST TOMOSYNTHESIS BI: CPT

## 2018-10-09 DIAGNOSIS — F41.9 ANXIETY: ICD-10-CM

## 2018-10-09 DIAGNOSIS — F32.A DEPRESSION, UNSPECIFIED DEPRESSION TYPE: ICD-10-CM

## 2018-10-09 RX ORDER — VENLAFAXINE HYDROCHLORIDE 75 MG/1
CAPSULE, EXTENDED RELEASE ORAL
Qty: 90 CAPSULE | Refills: 3 | Status: SHIPPED | OUTPATIENT
Start: 2018-10-09

## 2018-12-26 ENCOUNTER — PATIENT MESSAGE (OUTPATIENT)
Dept: FAMILY MEDICINE CLINIC | Age: 58
End: 2018-12-26

## 2018-12-26 ENCOUNTER — CLINICAL DOCUMENTATION (OUTPATIENT)
Dept: FAMILY MEDICINE CLINIC | Age: 58
End: 2018-12-26

## 2018-12-26 DIAGNOSIS — F41.9 ANXIETY: ICD-10-CM

## 2018-12-26 RX ORDER — ZOLPIDEM TARTRATE 10 MG/1
10 TABLET ORAL NIGHTLY PRN
Qty: 30 TABLET | Refills: 2 | Status: SHIPPED | OUTPATIENT
Start: 2018-12-26 | End: 2019-01-25

## 2019-01-02 RX ORDER — LEVOTHYROXINE SODIUM 0.07 MG/1
TABLET ORAL
Qty: 90 TABLET | Refills: 1 | Status: SHIPPED | OUTPATIENT
Start: 2019-01-02

## 2019-02-04 ENCOUNTER — OFFICE VISIT (OUTPATIENT)
Dept: FAMILY MEDICINE CLINIC | Age: 59
End: 2019-02-04
Payer: COMMERCIAL

## 2019-02-04 VITALS
DIASTOLIC BLOOD PRESSURE: 78 MMHG | RESPIRATION RATE: 16 BRPM | WEIGHT: 180 LBS | HEART RATE: 80 BPM | SYSTOLIC BLOOD PRESSURE: 126 MMHG | TEMPERATURE: 98.6 F | BODY MASS INDEX: 33.13 KG/M2 | HEIGHT: 62 IN

## 2019-02-04 DIAGNOSIS — E03.9 HYPOTHYROIDISM, UNSPECIFIED TYPE: ICD-10-CM

## 2019-02-04 DIAGNOSIS — R25.2 LEG CRAMPS: ICD-10-CM

## 2019-02-04 DIAGNOSIS — L71.9 ROSACEA: ICD-10-CM

## 2019-02-04 DIAGNOSIS — F41.9 ANXIETY: ICD-10-CM

## 2019-02-04 DIAGNOSIS — E78.2 MIXED HYPERLIPIDEMIA: Primary | ICD-10-CM

## 2019-02-04 PROCEDURE — G8417 CALC BMI ABV UP PARAM F/U: HCPCS | Performed by: FAMILY MEDICINE

## 2019-02-04 PROCEDURE — G8599 NO ASA/ANTIPLAT THER USE RNG: HCPCS | Performed by: FAMILY MEDICINE

## 2019-02-04 PROCEDURE — 3017F COLORECTAL CA SCREEN DOC REV: CPT | Performed by: FAMILY MEDICINE

## 2019-02-04 PROCEDURE — 1036F TOBACCO NON-USER: CPT | Performed by: FAMILY MEDICINE

## 2019-02-04 PROCEDURE — G8427 DOCREV CUR MEDS BY ELIG CLIN: HCPCS | Performed by: FAMILY MEDICINE

## 2019-02-04 PROCEDURE — G8484 FLU IMMUNIZE NO ADMIN: HCPCS | Performed by: FAMILY MEDICINE

## 2019-02-04 PROCEDURE — 99214 OFFICE O/P EST MOD 30 MIN: CPT | Performed by: FAMILY MEDICINE

## 2019-02-04 RX ORDER — DOXYCYCLINE HYCLATE 50 MG/1
50 CAPSULE ORAL 2 TIMES DAILY
Qty: 60 CAPSULE | Refills: 3 | Status: SHIPPED | OUTPATIENT
Start: 2019-02-04 | End: 2019-03-06

## 2019-02-04 ASSESSMENT — PATIENT HEALTH QUESTIONNAIRE - PHQ9
SUM OF ALL RESPONSES TO PHQ QUESTIONS 1-9: 2
SUM OF ALL RESPONSES TO PHQ QUESTIONS 1-9: 2
1. LITTLE INTEREST OR PLEASURE IN DOING THINGS: 1
2. FEELING DOWN, DEPRESSED OR HOPELESS: 1
SUM OF ALL RESPONSES TO PHQ9 QUESTIONS 1 & 2: 2

## 2019-03-04 ENCOUNTER — OFFICE VISIT (OUTPATIENT)
Dept: FAMILY MEDICINE CLINIC | Age: 59
End: 2019-03-04
Payer: COMMERCIAL

## 2019-03-04 VITALS
HEART RATE: 96 BPM | SYSTOLIC BLOOD PRESSURE: 128 MMHG | RESPIRATION RATE: 15 BRPM | TEMPERATURE: 97.8 F | DIASTOLIC BLOOD PRESSURE: 76 MMHG | BODY MASS INDEX: 32.79 KG/M2 | OXYGEN SATURATION: 98 % | HEIGHT: 62 IN | WEIGHT: 178.2 LBS

## 2019-03-04 DIAGNOSIS — N12 PYELONEPHRITIS: ICD-10-CM

## 2019-03-04 DIAGNOSIS — N12 PYELONEPHRITIS: Primary | ICD-10-CM

## 2019-03-04 LAB
BACTERIA: NEGATIVE /HPF
BILIRUBIN URINE: NEGATIVE
BILIRUBIN, POC: 0
BLOOD URINE, POC: 3
BLOOD, URINE: ABNORMAL
CLARITY, POC: CLEAR
CLARITY: CLEAR
COLOR, POC: ABNORMAL
COLOR: YELLOW
EPITHELIAL CELLS, UA: ABNORMAL /HPF (ref 0–5)
GLUCOSE URINE, POC: 0
GLUCOSE URINE: NEGATIVE MG/DL
HYALINE CASTS: ABNORMAL /HPF (ref 0–5)
KETONES, POC: 0
KETONES, URINE: NEGATIVE MG/DL
LEUKOCYTE EST, POC: 0
LEUKOCYTE ESTERASE, URINE: ABNORMAL
NITRITE, POC: 0
NITRITE, URINE: NEGATIVE
PH UA: 6.5 (ref 5–9)
PH, POC: 6
PROTEIN UA: NEGATIVE MG/DL
PROTEIN, POC: 0
RBC UA: >100 /HPF (ref 0–5)
SPECIFIC GRAVITY UA: 1.01 (ref 1–1.03)
SPECIFIC GRAVITY, POC: 1.01
UROBILINOGEN, POC: 0
UROBILINOGEN, URINE: 0.2 E.U./DL
WBC UA: ABNORMAL /HPF (ref 0–5)

## 2019-03-04 PROCEDURE — 99214 OFFICE O/P EST MOD 30 MIN: CPT | Performed by: INTERNAL MEDICINE

## 2019-03-04 PROCEDURE — 81003 URINALYSIS AUTO W/O SCOPE: CPT | Performed by: INTERNAL MEDICINE

## 2019-03-04 PROCEDURE — 96372 THER/PROPH/DIAG INJ SC/IM: CPT | Performed by: INTERNAL MEDICINE

## 2019-03-04 PROCEDURE — G8417 CALC BMI ABV UP PARAM F/U: HCPCS | Performed by: INTERNAL MEDICINE

## 2019-03-04 PROCEDURE — G8484 FLU IMMUNIZE NO ADMIN: HCPCS | Performed by: INTERNAL MEDICINE

## 2019-03-04 PROCEDURE — G8599 NO ASA/ANTIPLAT THER USE RNG: HCPCS | Performed by: INTERNAL MEDICINE

## 2019-03-04 PROCEDURE — G8427 DOCREV CUR MEDS BY ELIG CLIN: HCPCS | Performed by: INTERNAL MEDICINE

## 2019-03-04 PROCEDURE — 3017F COLORECTAL CA SCREEN DOC REV: CPT | Performed by: INTERNAL MEDICINE

## 2019-03-04 PROCEDURE — 1036F TOBACCO NON-USER: CPT | Performed by: INTERNAL MEDICINE

## 2019-03-04 RX ORDER — SULFAMETHOXAZOLE AND TRIMETHOPRIM 800; 160 MG/1; MG/1
1 TABLET ORAL 2 TIMES DAILY
Qty: 10 TABLET | Refills: 0 | Status: SHIPPED | OUTPATIENT
Start: 2019-03-04 | End: 2019-03-04 | Stop reason: DRUGHIGH

## 2019-03-04 RX ORDER — CEFTRIAXONE 1 G/1
1 INJECTION, POWDER, FOR SOLUTION INTRAMUSCULAR; INTRAVENOUS ONCE
Qty: 1 G | Refills: 0 | Status: CANCELLED
Start: 2019-03-04 | End: 2019-03-04

## 2019-03-04 RX ORDER — SULFAMETHOXAZOLE AND TRIMETHOPRIM 800; 160 MG/1; MG/1
1 TABLET ORAL 2 TIMES DAILY
Qty: 14 TABLET | Refills: 0 | Status: SHIPPED | OUTPATIENT
Start: 2019-03-04 | End: 2019-03-11

## 2019-03-04 RX ORDER — CEFTRIAXONE SODIUM 250 MG/1
250 INJECTION, POWDER, FOR SOLUTION INTRAMUSCULAR; INTRAVENOUS ONCE
Status: COMPLETED | OUTPATIENT
Start: 2019-03-04 | End: 2019-03-04

## 2019-03-04 RX ADMIN — CEFTRIAXONE SODIUM 250 MG: 250 INJECTION, POWDER, FOR SOLUTION INTRAMUSCULAR; INTRAVENOUS at 17:40

## 2019-03-04 ASSESSMENT — ENCOUNTER SYMPTOMS
BACK PAIN: 0
SHORTNESS OF BREATH: 0
ABDOMINAL PAIN: 0
EYE PAIN: 0

## 2019-03-06 DIAGNOSIS — N12 PYELONEPHRITIS: Primary | ICD-10-CM

## 2019-03-06 LAB — URINE CULTURE, ROUTINE: NORMAL

## 2019-03-26 ENCOUNTER — TELEPHONE (OUTPATIENT)
Dept: FAMILY MEDICINE CLINIC | Age: 59
End: 2019-03-26

## 2019-12-17 PROBLEM — M96.1 POST LAMINECTOMY SYNDROME: Status: ACTIVE | Noted: 2019-12-17

## 2019-12-17 PROBLEM — M46.1 SACROILIITIS (HCC): Status: ACTIVE | Noted: 2019-12-17

## 2021-06-17 ENCOUNTER — TELEPHONE (OUTPATIENT)
Dept: PAIN MANAGEMENT | Age: 61
End: 2021-06-17

## 2021-06-22 ENCOUNTER — TELEPHONE (OUTPATIENT)
Dept: PAIN MANAGEMENT | Age: 61
End: 2021-06-22

## 2021-06-22 ENCOUNTER — OFFICE VISIT (OUTPATIENT)
Dept: NEUROSURGERY | Age: 61
End: 2021-06-22
Payer: COMMERCIAL

## 2021-06-22 VITALS — WEIGHT: 166 LBS | BODY MASS INDEX: 30.55 KG/M2 | TEMPERATURE: 97.6 F | HEIGHT: 62 IN

## 2021-06-22 DIAGNOSIS — M96.1 POST LAMINECTOMY SYNDROME: ICD-10-CM

## 2021-06-22 DIAGNOSIS — M47.817 LUMBOSACRAL SPONDYLOSIS WITHOUT MYELOPATHY: ICD-10-CM

## 2021-06-22 DIAGNOSIS — M46.1 SACROILIITIS (HCC): Primary | ICD-10-CM

## 2021-06-22 PROCEDURE — 99214 OFFICE O/P EST MOD 30 MIN: CPT | Performed by: NURSE PRACTITIONER

## 2021-06-22 RX ORDER — TIZANIDINE 2 MG/1
2 TABLET ORAL NIGHTLY PRN
Qty: 14 TABLET | Refills: 0 | Status: SHIPPED | OUTPATIENT
Start: 2021-06-22 | End: 2021-07-02 | Stop reason: SDUPTHER

## 2021-06-22 ASSESSMENT — ENCOUNTER SYMPTOMS
COLOR CHANGE: 0
ABDOMINAL PAIN: 0
RESPIRATORY NEGATIVE: 1
BACK PAIN: 1

## 2021-06-22 NOTE — PROGRESS NOTES
Patient: Kristen Ledbetter  YOB: 1960  Date: 6/22/21        Subjective:     Kristen Ledbetter is a 64 y.o. female who complains today of:    Chief Complaint   Patient presents with    Back Pain     Discuss injections         Allergies:  Codeine and Lipitor [atorvastatin]    Past Medical History:   Diagnosis Date    Anxiety     CAD (coronary artery disease)     past episodes chest pains / evaluation by [address] / holter monitor superventricular premature beats    CAD (coronary artery disease)     no stents / last appt > 2 yrs    Chronic back pain     Depression     Hyperlipidemia     past trx > 5 yrs /stopped Lipitor due to muscle & joint pain    Hypothyroidism     meds < 5 yrs    Osteoarthritis     major joints & back    PONV (postoperative nausea and vomiting)     Retention of urine      Past Surgical History:   Procedure Laterality Date    BREAST SURGERY  age 52    exc.  benign left breast mass    CARDIAC CATHETERIZATION  2007    [address]    CARPAL TUNNEL RELEASE Right 1986    CHOLECYSTECTOMY      COLONOSCOPY  9/10/12    DR Ashley Torres    COLONOSCOPY  07/24/2017    Ivan Fernández MD    HYSTERECTOMY  7-2009    LUMBAR FUSION N/A 11/7/2016    LEFT AND BILATERAL L4-5 MICRODISSECTION DECOMPRESSION DISKECTOMY INTERBODY POSTEROLATERAL FUSION PEDICLE SCREWS performed by Milagro Fan MD at [address] [facility] DX W/COLLJ [facility]/ [facility] PRFRMD N/A 4/19/2018    ANOSCOPY performed by Bon Amaral MD at [address] [facility] HEMORRHOIDECTOMY,INT/EXT, 2+ COLUMNS/GROUPS N/A 4/19/2018    See op note    [place] PROCTOSIGMOIDOSCOPY,RIGID,DIAGNOS N/A 4/19/2018    RIGID SIGMOIDOSCOPY performed by Bon Amaral MD at [address] [facility] RECTAL SURGERY  2005    TEAR/THROMBOSIS    SKIN CANCER EXCISION  1983    BASAL CELL /     TONSILLECTOMY      WISDOM TOOTH EXTRACTION  1985     Family History   Problem Relation Age of Onset    Mental Illness Mother     Alcohol Abuse Father     Arthritis Sister     Mental Illness Brother Social History     Socioeconomic History    Marital status:      Spouse name: Not on file    Number of children: Not on file    Years of education: Not on file    Highest education level: Not on file   Occupational History    Not on file   Tobacco Use    Smoking status: Never Smoker    Smokeless tobacco: Never Used   Substance and Sexual Activity    Alcohol use: No    Drug use: No    Sexual activity: Not on file   Other Topics Concern    Not on file   Social History Narrative    Not on file     Social Determinants of Health     Financial Resource Strain:     Difficulty of Paying Living Expenses:    Food Insecurity:     Worried About Running Out of Food in the Last Year:     [address] in the Last Year:    Transportation Needs:     Lack of Transportation (Medical):      Lack of Transportation (Non-Medical):    Physical Activity:     Days of Exercise per Week:     Minutes of Exercise per Session:    Stress:     Feeling of Stress :    Social Connections:     Frequency of Communication with Friends and Family:     Frequency of Social Gatherings with Friends and Family:     Attends Buddhist Services:     Active Member of Clubs or Organizations:     Attends Club or Organization Meetings:     Marital Status:    Intimate Partner Violence:     Fear of Current or Ex-Partner:     Emotionally Abused:     Physically Abused:     Sexually Abused:        Current Outpatient Medications on File Prior to Visit   Medication Sig Dispense Refill    ibuprofen (ADVIL;MOTRIN) 600 MG tablet Take 1 tablet by mouth 3 times daily as needed for Pain 90 tablet 5    levothyroxine (SYNTHROID) 75 MCG tablet TAKE 1 TABLET DAILY 90 tablet 1    Brimonidine Tartrate 0.33 % GEL Apply 1 Applicatorful topically daily 1 Tube 1    venlafaxine (EFFEXOR XR) 75 MG extended release capsule TAKE 1 CAPSULE DAILY 90 capsule 3    naproxen-esomeprazole (VIMOVO) 500-20 MG TBEC Take 1 tablet by mouth 2 times daily as needed (pain) 60 tablet 5    cetirizine (ZYRTEC ALLERGY) 10 MG tablet Take 1 tablet by mouth daily (Patient taking differently: Take 10 mg by mouth daily as needed ) 30 tablet 11     No current facility-administered medications on file prior to visit. Is following up today for low back pain. She has history of chronic low back pain with fusion L4-5 11/07/2016. She was last seen after bilateral SI joint injection last year and over the last month the pain has returned. She is asking to have the shots again. Last visit an MRI was ordered however she did not get it because she was feeling well. Her pain is mostly in the right low back goes around her hip to her upper thigh. No leg pain. She has SI belt which offers some relief but most of her pain is while sitting. He takes ibuprofen sparingly. She is having difficulty sleeping due to the pain. XR report of low back in November 2019 Postoperative changes at L4-5. Degenerative disc changes visible in the upper lumbar region, no instability. Back Pain  Pertinent negatives include no abdominal pain, headaches, numbness or weakness. Review of Systems   Constitutional: Negative. Negative for activity change and unexpected weight change. HENT: Negative. Respiratory: Negative. Gastrointestinal: Negative for abdominal pain. Genitourinary: Negative. Musculoskeletal: Positive for back pain. Negative for gait problem, joint swelling, neck pain and neck stiffness. Skin: Negative for color change and rash. Neurological: Negative. Negative for dizziness, seizures, weakness, light-headedness, numbness and headaches. Psychiatric/Behavioral: Negative. Negative for sleep disturbance. Objective:     Vitals:  Temp 97.6 °F (36.4 °C)   Ht 5' 2\" (1.575 m)   Wt 166 lb (75.3 kg)   LMP  (LMP Unknown)   BMI 30.36 kg/m² Pain Score:   6    Physical Exam  Vitals reviewed.    Constitutional:       Appearance: She is well-developed. HENT:      Head: Normocephalic. Nose: Nose normal.   Pulmonary:      Effort: Pulmonary effort is normal.   Musculoskeletal:      Cervical back: Normal range of motion and neck supple. Lumbar back: Tenderness present. Negative right straight leg raise test and negative left straight leg raise test.      Comments: Pain Over right S I joint with (+) provacative manuvers. SLR Negative. Lymphadenopathy:      Cervical: No cervical adenopathy. Skin:     General: Skin is warm and dry. Findings: No erythema or rash. Neurological:      Mental Status: She is alert and oriented to person, place, and time. Cranial Nerves: No cranial nerve deficit. Deep Tendon Reflexes: Reflexes are normal and symmetric. Reflexes normal.   Psychiatric:         Mood and Affect: Mood normal.         Behavior: Behavior normal.         Thought Content: Thought content normal.         Judgment: Judgment normal.            Assessment:        Diagnosis Orders   1. Sacroiliitis (HCC)  MT INJECT SI JOINT ARTHRGRPHY&/ANES/STEROID W/IMAGE    CHG FLUOR NEEDLE/CATH SPINE/PARASPINAL DX/THER ADDON   2. Lumbosacral spondylosis without myelopathy  tiZANidine (ZANAFLEX) 2 MG tablet   3. Post laminectomy syndrome  tiZANidine (ZANAFLEX) 2 MG tablet       Plan:          Orders Placed This Encounter   Medications    tiZANidine (ZANAFLEX) 2 MG tablet     Sig: Take 1 tablet by mouth nightly as needed (pain)     Dispense:  14 tablet     Refill:  0       Orders Placed This Encounter   Procedures    CHG FLUOR NEEDLE/CATH SPINE/PARASPINAL DX/THER ADDON     Standing Status:   Future     Standing Expiration Date:   9/20/2021    MT INJECT SI JOINT ARTHRGRPHY&/ANES/STEROID W/IMAGE     Right SI with sk     Standing Status:   Future     Standing Expiration Date:   9/20/2021     -I gave her information on SI joint inflammation as well as exercises for her to perform at home. she has flared in the sacroiliac joint.  she has failed conservative treatment in the past. We will set the patient up for right S.I. Joint injection. Anatomic model of pathology was shown. Risks and benefits of the procedure were discussed. All questions were answered and patient understands and agrees with the plan. -She may also need facet or RFA above her fusion at some point because she is a little tender higher in her lumbar spine as well. But most of her pain today is the low SI joint.  -I sent tizanidine 2 mg p.o. as needed at bedtime #14    Follow up:  Return for f/u after procedure and reassess pain/medications.     CORBY Law - CNP

## 2021-06-22 NOTE — TELEPHONE ENCOUNTER
ORDER PLACED:    Date: 06/22/21  Description: RIGHT SI JOINT INJ   Order Number: [id number]  Ordering Provider: Bran Alvarez  Performing Provider: MISBAH  CPT Codes: 21565  ICD10 Codes: M46.1

## 2021-06-22 NOTE — PATIENT INSTRUCTIONS
Patient Education        Sacroiliac Joint Pain: Care Instructions  Your Care Instructions     The sacroiliac joints connect the spine and each side of the pelvis. These joints bear the weight and stress of your torso. This makes them easy to injure. Injury or overuse of these joints may cause low back pain. Stress on these joints can cause joint pain. Sacroiliac joint pain is more common in pregnant women. Certain kinds of arthritis also may cause this type of joint pain. Home treatment may help you feel better. So can avoiding activities that stress your back. Your doctor also may recommend physical therapy. This may include doing exercises and stretches to help with pain. You may also learn to use good posture. Follow-up care is a key part of your treatment and safety. Be sure to make and go to all appointments, and call your doctor if you are having problems. It's also a good idea to know your test results and keep a list of the medicines you take. How can you care for yourself at home? · Ask your doctor about light exercises that may help your back pain. Try to do light activity throughout the day. But make sure to take rests as needed. Find a comfortable position for rest, but don't stay in one position for too long. Avoid activities that cause pain. · To apply heat, put a warm water bottle, a heating pad set on low, or a warm cloth on your back. Do not go to sleep with a heating pad on your skin. · Put ice or a cold pack on your back for 10 to 20 minutes at a time. Put a thin cloth between the ice and your skin. · If the doctor gave you a prescription medicine for pain, take it as prescribed. · If you are not taking a prescription pain medicine, ask your doctor if you can take an over-the-counter pain medicine, such as acetaminophen (Tylenol), ibuprofen (Advil, Motrin), or naproxen (Aleve). Read and follow all instructions on the label. Take pain medicines exactly as directed.   · Do not take two or more pain medicines at the same time unless the doctor told you to. Many pain medicines have acetaminophen, which is Tylenol. Too much acetaminophen (Tylenol) can be harmful. · To prevent future back pain, do exercises to stretch and strengthen your back and stomach. Learn how to use good posture, safe lifting techniques, and proper body mechanics. When should you call for help? Call 911 anytime you think you may need emergency care. For example, call if:    · You are unable to move a leg at all. Call your doctor now or seek immediate medical care if:    · You have new or worse symptoms in your legs or buttocks. Symptoms may include:  ? Numbness or tingling. ? Weakness. ? Pain.     · You lose bladder or bowel control. Watch closely for changes in your health, and be sure to contact your doctor if:    · You are not getting better as expected. Where can you learn more? Go to https://"[employer]. org and sign in to your [employer] account. Enter C250 in the Awesome Maps box to learn more about \"Sacroiliac Joint Pain: Care Instructions. \"     If you do not have an account, please click on the \"Sign Up Now\" link. Current as of: November 16, 2020               Content Version: 12.9  © 2006-2021 [employer]. Care instructions adapted under license by [facility] ([facility]). If you have questions about a medical condition or this instruction, always ask your healthcare professional. [place] [address] any warranty or liability for your use of this information. Patient Education        Sacroiliac Pain: Exercises  Introduction  Here are some examples of exercises for you to try. The exercises may be suggested for a condition or for rehabilitation. Start each exercise slowly. Ease off the exercises if you start to have pain. You will be told when to start these exercises and which ones will work best for you.   How to do the exercises  Knee-to-chest stretch 1. Do not do the knee-to-chest exercise if it causes or increases back or leg pain. 2. Lie on your back with your knees bent and your feet flat on the floor. You can put a small pillow under your head and neck if it is more comfortable. 3. Grasp your hands under one knee and bring the knee to your chest, keeping the other foot flat on the floor. 4. Keep your lower back pressed to the floor. Hold for at least 15 to 30 seconds. 5. Relax and lower the knee to the starting position. Repeat with the other leg. 6. Repeat 2 to 4 times with each leg. 7. To get more stretch, keep your other leg flat on the floor while pulling your knee to your chest.    Bridging   1. Lie on your back with both knees bent. Your knees should be bent about 90 degrees. 2. Tighten your belly muscles by pulling in your belly button toward your spine. Then push your feet into the floor, squeeze your buttocks, and lift your hips off the floor until your shoulders, hips, and knees are all in a straight line. 3. Hold for about 6 seconds as you continue to breathe normally, and then slowly lower your hips back down to the floor and rest for up to 10 seconds. 4. Repeat 8 to 12 times. Hip extension   1. Get down on your hands and knees on the floor. 2. Keeping your back and neck straight, lift one leg straight out behind you. When you lift your leg, keep your hips level. Don't let your back twist, and don't let your hip drop toward the floor. 3. Hold for 6 seconds. Repeat 8 to 12 times with each leg. 4. If you feel steady and strong when you do this exercise, you can make it more difficult. To do this, when you lift your leg, also lift the opposite arm straight out in front of you. For example, lift the left leg and the right arm at the same time. (This is sometimes called the \"bird dog exercise. \") Hold for 6 seconds, and repeat 8 to 12 times on each side. Clamshell   1. Lie on your side with a pillow under your head.  Keep your feet and knees together and your knees bent. 2. Raise your top knee, but keep your feet together. Do not let your hips roll back. Your legs should open up like a clamshell. 3. Hold for 6 seconds. 4. Slowly lower your knee back down. Rest for 10 seconds. 5. Repeat 8 to 12 times. 6. Switch to your other side and repeat steps 1 through 5. Hamstring wall stretch   1. Lie on your back in a doorway, with one leg through the open door. 2. Slide your affected leg up the wall to straighten your knee. You should feel a gentle stretch down the back of your leg. 3. Hold the stretch for at least 1 minute to begin. Then try to lengthen the time you hold the stretch to as long as 6 minutes. 4. Switch legs, and repeat steps 1 through 3.  5. Repeat 2 to 4 times. 6. If you do not have a place to do this exercise in a doorway, there is another way to do it:  7. Lie on your back, and bend one knee. 8. Loop a towel under the ball and toes of that foot, and hold the ends of the towel in your hands. 9. Straighten your knee, and slowly pull back on the towel. You should feel a gentle stretch down the back of your leg. 10. Switch legs, and repeat steps 1 through 3.  11. Repeat 2 to 4 times. 1. Do not arch your back. 2. Do not bend either knee. 3. Keep one heel touching the floor and the other heel touching the wall. Do not point your toes. Lower abdominal strengthening   1. Lie on your back with your knees bent and your feet flat on the floor. 2. Tighten your belly muscles by pulling your belly button in toward your spine. 3. Lift one foot off the floor and bring your knee toward your chest, so that your knee is straight above your hip and your leg is bent like the letter \"L. \"  4. Lift the other knee up to the same position. 5. Lower one leg at a time to the starting position. 6. Keep alternating legs until you have lifted each leg 8 to 12 times.   7. Be sure to keep your belly muscles tight and your back still as you are

## 2021-06-29 NOTE — TELEPHONE ENCOUNTER
RIGHT SI JOINT INJECTION    NO AUTH REQUIRED    OK to schedule procedure approved as above. Please note sides/levels approved and date range.    (If applicable, sides/levels approved may differ from those ordered)     [address]

## 2021-06-29 NOTE — TELEPHONE ENCOUNTER
BENEFITS:    Insurance: MARLENE  Phone: [phone]  Contact Name: Prasanna Vieyra Date: 1/1/2020     Plan year: CAL YEAR  Deductible: N/A      Deductible Met: N/A  Allowed/benefits paid at: 100% AFTER $30 COPAY  OOP: $2000 WITH $536.26 MET  Freq Limits: BASED ON MEDICAL NECESSITY  Prior Auth Requirement: NO AUTH REQUIRED    Notes: $30 COPAY THEN COVERED %    Call Reference #: [id number]    Time of call: 11:18 AM

## 2021-07-02 ENCOUNTER — PROCEDURE VISIT (OUTPATIENT)
Dept: PAIN MANAGEMENT | Age: 61
End: 2021-07-02
Payer: COMMERCIAL

## 2021-07-02 DIAGNOSIS — M47.817 LUMBOSACRAL SPONDYLOSIS WITHOUT MYELOPATHY: ICD-10-CM

## 2021-07-02 DIAGNOSIS — M46.1 SACROILIITIS (HCC): Primary | ICD-10-CM

## 2021-07-02 DIAGNOSIS — M96.1 POST LAMINECTOMY SYNDROME: ICD-10-CM

## 2021-07-02 PROCEDURE — 27096 INJECT SACROILIAC JOINT: CPT | Performed by: PAIN MEDICINE

## 2021-07-02 RX ORDER — TIZANIDINE 2 MG/1
2 TABLET ORAL NIGHTLY PRN
Qty: 30 TABLET | Refills: 0 | Status: SHIPPED | OUTPATIENT
Start: 2021-07-02 | End: 2021-07-20 | Stop reason: SDUPTHER

## 2021-07-02 RX ORDER — BETAMETHASONE SODIUM PHOSPHATE AND BETAMETHASONE ACETATE 3; 3 MG/ML; MG/ML
6 INJECTION, SUSPENSION INTRA-ARTICULAR; INTRALESIONAL; INTRAMUSCULAR; SOFT TISSUE ONCE
Status: COMPLETED | OUTPATIENT
Start: 2021-07-02 | End: 2021-07-02

## 2021-07-02 RX ORDER — LIDOCAINE HYDROCHLORIDE 10 MG/ML
2 INJECTION, SOLUTION EPIDURAL; INFILTRATION; INTRACAUDAL; PERINEURAL ONCE
Status: COMPLETED | OUTPATIENT
Start: 2021-07-02 | End: 2021-07-02

## 2021-07-02 RX ADMIN — BETAMETHASONE SODIUM PHOSPHATE AND BETAMETHASONE ACETATE 6 MG: 3; 3 INJECTION, SUSPENSION INTRA-ARTICULAR; INTRALESIONAL; INTRAMUSCULAR; SOFT TISSUE at 12:50

## 2021-07-02 RX ADMIN — LIDOCAINE HYDROCHLORIDE 2 ML: 10 INJECTION, SOLUTION EPIDURAL; INFILTRATION; INTRACAUDAL; PERINEURAL at 12:50

## 2021-07-02 NOTE — PROGRESS NOTES
[facility]) Physicians  Neurosurgery and Pain [facility]  [address] M[address]: [phone]  F: [phone]      Patient Name: Paloma Colunga  : 1960     Date:  2021      Physician: Rina Reed DO        Paloma Colunga is here today for interventional pain management. Preoperatively, the patient presents with SI joint mediated pain, as per history and exam. Patient has failed NSAIDs, PT, and conservative treatment. Patient has significant psychological and functional impairment due to this condition. Standard ASIPP guidelines were followed and sterile technique used. Area was cleaned with Betadine x3. Informed consent was obtained. Fluoroscopic guidance was used for this procedure. S.I. JOINT:  Right  Appropriate length spinal needle was advanced to the S.I. Joint. Negative aspiration was achieved. In total, approximately 6mg of Betamethasone and 2ml of 1% preservative free Lidocaine was injected without difficulty. Patient tolerated the procedure well, no obvious complications occurred during the procedure. Patient was appropriately monitored and discharged home in stable condition with their usual motor strength. Post Op Instructions were given to patient. Relevant and recent imaging reviewed with patient today.                                       Rina Reed DO

## 2021-07-20 ENCOUNTER — OFFICE VISIT (OUTPATIENT)
Dept: NEUROSURGERY | Age: 61
End: 2021-07-20
Payer: COMMERCIAL

## 2021-07-20 VITALS
HEIGHT: 62 IN | BODY MASS INDEX: 30.55 KG/M2 | SYSTOLIC BLOOD PRESSURE: 116 MMHG | WEIGHT: 166 LBS | DIASTOLIC BLOOD PRESSURE: 64 MMHG | TEMPERATURE: 97.3 F

## 2021-07-20 DIAGNOSIS — M46.1 SACROILIITIS (HCC): Primary | ICD-10-CM

## 2021-07-20 DIAGNOSIS — M96.1 POST LAMINECTOMY SYNDROME: ICD-10-CM

## 2021-07-20 DIAGNOSIS — M47.817 LUMBOSACRAL SPONDYLOSIS WITHOUT MYELOPATHY: ICD-10-CM

## 2021-07-20 PROBLEM — G47.9 DIFFICULTY SLEEPING: Status: ACTIVE | Noted: 2021-07-20

## 2021-07-20 PROBLEM — M54.9 BACK PAIN: Status: ACTIVE | Noted: 2021-07-20

## 2021-07-20 PROBLEM — L70.9 ACNE: Status: ACTIVE | Noted: 2021-07-20

## 2021-07-20 PROBLEM — H69.80 DYSFUNCTION OF EUSTACHIAN TUBE: Status: ACTIVE | Noted: 2021-07-20

## 2021-07-20 PROBLEM — N63.0 MASS OF BREAST: Status: ACTIVE | Noted: 2021-07-20

## 2021-07-20 PROBLEM — Z85.828 HISTORY OF BASAL CELL CARCINOMA (BCC): Status: ACTIVE | Noted: 2021-07-20

## 2021-07-20 PROBLEM — Z85.89 HISTORY OF SQUAMOUS CELL CARCINOMA: Status: ACTIVE | Noted: 2021-07-20

## 2021-07-20 PROBLEM — H69.90 DYSFUNCTION OF EUSTACHIAN TUBE: Status: ACTIVE | Noted: 2021-07-20

## 2021-07-20 PROBLEM — R31.9 HEMATURIA: Status: ACTIVE | Noted: 2021-07-20

## 2021-07-20 PROBLEM — M25.50 ARTHRALGIA: Status: ACTIVE | Noted: 2021-07-20

## 2021-07-20 PROBLEM — H90.0 CONDUCTIVE HEARING LOSS, BILATERAL: Status: ACTIVE | Noted: 2021-07-20

## 2021-07-20 PROBLEM — L71.9 ROSACEA: Status: ACTIVE | Noted: 2021-07-20

## 2021-07-20 PROCEDURE — 99213 OFFICE O/P EST LOW 20 MIN: CPT | Performed by: NURSE PRACTITIONER

## 2021-07-20 RX ORDER — TIZANIDINE 2 MG/1
2 TABLET ORAL NIGHTLY PRN
Qty: 30 TABLET | Refills: 1 | Status: SHIPPED | OUTPATIENT
Start: 2021-07-20 | End: 2021-10-19 | Stop reason: SDUPTHER

## 2021-07-20 ASSESSMENT — ENCOUNTER SYMPTOMS
ABDOMINAL PAIN: 0
BACK PAIN: 1
RESPIRATORY NEGATIVE: 1
COLOR CHANGE: 0

## 2021-07-20 NOTE — PATIENT INSTRUCTIONS
Patient Education        Learning About Medial Branch Block and Neurotomy  What are medial branch block and neurotomy? Facet joints connect your vertebrae to each other. Problems in these joints can cause chronic (long-term) pain in the neck or back. Medial branch nerves are the nerves that carry many of the pain messages from your facet joints. Radiofrequency medial branch neurotomy is a type of medial branch neurotomy that is used to relieve arthritis pain. It uses radio waves to damage nerves in your neck or back so that they can no longer send pain messages to your brain. Before your doctor knows if a neurotomy will help you, you will get a medial branch block to find out if certain nerves are the ones that are a source of your pain. You will need two separate visits to the outpatient center or hospital to have both procedures. You will need someone to drive you home. How is a medial branch block done? The doctor will use a tiny needle to numb the skin where you will get the block. Then the doctor puts the block needle into the numbed area. You may feel some pressure, but you should not feel pain. Using fluoroscopy (live X-ray) to guide the needle, the doctor injects medicine onto one or more nerves to make them numb. If you get relief from your pain in the next 4 to 6 hours, it's a sign that those nerves may be contributing to your pain. The relief will last only a short time. You may then have a medial branch neurotomy at a later visit to try to get longer relief. How is a medial branch neurotomy done? The doctor will use a tiny needle to numb the skin where you will get the neurotomy. Then the doctor puts the neurotomy needle into the numbed area. You may feel some pressure. Using fluoroscopy (live X-ray) to guide the needle, the doctor sends radio waves through the needle to the nerve for 60 to 90 seconds. The radio waves heat the nerve, which damages it. The doctor may do this several times. neurotomy? Facet joints connect your vertebrae to each other. Problems in these joints can cause chronic (long-term) pain in the neck or back. Medial branch nerves are the nerves that carry many of the pain messages from your facet joints. Radiofrequency medial branch neurotomy is a type of medial branch neurotomy that is used to relieve arthritis pain. It uses radio waves to damage nerves in your neck or back so that they can no longer send pain messages to your brain. Before your doctor knows if a neurotomy will help you, you will get a medial branch block to find out if certain nerves are the ones that are a source of your pain. You will need two separate visits to the outpatient center or hospital to have both procedures. You will need someone to drive you home. How is a medial branch block done? The doctor will use a tiny needle to numb the skin where you will get the block. Then the doctor puts the block needle into the numbed area. You may feel some pressure, but you should not feel pain. Using fluoroscopy (live X-ray) to guide the needle, the doctor injects medicine onto one or more nerves to make them numb. If you get relief from your pain in the next 4 to 6 hours, it's a sign that those nerves may be contributing to your pain. The relief will last only a short time. You may then have a medial branch neurotomy at a later visit to try to get longer relief. How is a medial branch neurotomy done? The doctor will use a tiny needle to numb the skin where you will get the neurotomy. Then the doctor puts the neurotomy needle into the numbed area. You may feel some pressure. Using fluoroscopy (live X-ray) to guide the needle, the doctor sends radio waves through the needle to the nerve for 60 to 90 seconds. The radio waves heat the nerve, which damages it. The doctor may do this several times. And more than one nerve may be treated. How long do medial branch block and neurotomy take?   It takes 20 to 30 minutes to get the block. You can go home after the doctor watches you for about an hour. It takes 45 to 90 minutes to get a neurotomy, depending on how many nerves are heated. You will probably go home 30 to 60 minutes after the procedure. What can you expect after a neurotomy? You will get instructions on how to report how much pain you have when you are at home. You may feel a little sore or tender at the injection site at first. But after a successful neurotomy, most people have pain relief right away. It often lasts for several months, but your pain may come back. If your pain does come back, it may mean that the damaged nerve has healed and can send pain messages again. Or it can mean that a different nerve is causing pain. Your doctor will discuss your options with you. Follow-up care is a key part of your treatment and safety. Be sure to make and go to all appointments, and call your doctor if you are having problems. It's also a good idea to know your test results and keep a list of the medicines you take. Where can you learn more? Go to [email or web address]. org and sign in to your [employer] account. Enter U320 in the [employer] box to learn more about \"Learning About Medial Branch Block and Neurotomy. \"     If you do not have an account, please click on the \"Sign Up Now\" link. Current as of: August 4, 2020               Content Version: 12.9  © 6864-7525 Healthwise, [employer]. Care instructions adapted under license by [facility] ([facility]). If you have questions about a medical condition or this instruction, always ask your healthcare professional. [address] any warranty or liability for your use of this information.

## 2021-07-20 NOTE — PROGRESS NOTES
Patient: Sridhar Rogers  YOB: 1960  Date: 7/20/21        Subjective:     Sridhar Rogers is a 64 y.o. female who complains today of:    Chief Complaint   Patient presents with    Follow-up     lower back pain         Allergies:  Codeine and Lipitor [atorvastatin]    Past Medical History:   Diagnosis Date    Anxiety     CAD (coronary artery disease)     past episodes chest pains / evaluation by [facility] / holter monitor superventricular premature beats    CAD (coronary artery disease)     no stents / last appt > 2 yrs    Chronic back pain     Depression     Hyperlipidemia     past trx > 5 yrs /stopped Lipitor due to muscle & joint pain    Hypothyroidism     meds < 5 yrs    Osteoarthritis     major joints & back    PONV (postoperative nausea and vomiting)     Retention of urine      Past Surgical History:   Procedure Laterality Date    BREAST SURGERY  age 52    exc.  benign left breast mass    CARDIAC CATHETERIZATION  2007    [facility]    CARPAL TUNNEL RELEASE Right 1986    CHOLECYSTECTOMY      COLONOSCOPY  9/10/12    DR Delia Shields    COLONOSCOPY  07/24/2017    Alban Martin MD    HYSTERECTOMY  7-2009    LUMBAR FUSION N/A 11/7/2016    LEFT AND BILATERAL L4-5 MICRODISSECTION DECOMPRESSION DISKECTOMY INTERBODY POSTEROLATERAL FUSION PEDICLE SCREWS performed by Frank Martines MD at [address] [facility] DX W/COLLJ [facility]IENT [facility] BR/ [facility] PRFRMD N/A 4/19/2018    ANOSCOPY performed by Stefanie Camarillo MD at [address] [facility] HEMORRHOIDECTOMY,INT/EXT, 2+ COLUMNS/GROUPS N/A 4/19/2018    See op note    AL PROCTOSIGMOIDOSCOPY,RIGID,DIAGNOS N/A 4/19/2018    RIGID SIGMOIDOSCOPY performed by Stefanie Camarillo MD at [address] [facility] RECTAL SURGERY  2005    TEAR/THROMBOSIS    SKIN CANCER EXCISION  1983    BASAL CELL /     TONSILLECTOMY      WISDOM TOOTH EXTRACTION  1985     Family History   Problem Relation Age of Onset    Mental Illness Mother     Alcohol Abuse Father     Arthritis Sister     Mental Illness Brother Social History     Socioeconomic History    Marital status:      Spouse name: Not on file    Number of children: Not on file    Years of education: Not on file    Highest education level: Not on file   Occupational History    Not on file   Tobacco Use    Smoking status: Never Smoker    Smokeless tobacco: Never Used   Substance and Sexual Activity    Alcohol use: No    Drug use: No    Sexual activity: Not on file   Other Topics Concern    Not on file   Social History Narrative    Not on file     Social Determinants of Health     Financial Resource Strain:     Difficulty of Paying Living Expenses:    Food Insecurity:     Worried About Running Out of Food in the Last Year:     [address] in the Last Year:    Transportation Needs:     Lack of Transportation (Medical):      Lack of Transportation (Non-Medical):    Physical Activity:     Days of Exercise per Week:     Minutes of Exercise per Session:    Stress:     Feeling of Stress :    Social Connections:     Frequency of Communication with Friends and Family:     Frequency of Social Gatherings with Friends and Family:     Attends [employer] Services:     Active Member of Clubs or Organizations:     Attends Club or Organization Meetings:     Marital Status:    Intimate Partner Violence:     Fear of Current or Ex-Partner:     Emotionally Abused:     Physically Abused:     Sexually Abused:        Current Outpatient Medications on File Prior to Visit   Medication Sig Dispense Refill    ibuprofen (ADVIL;MOTRIN) 600 MG tablet Take 1 tablet by mouth 3 times daily as needed for Pain 90 tablet 5    levothyroxine (SYNTHROID) 75 MCG tablet TAKE 1 TABLET DAILY 90 tablet 1    Brimonidine Tartrate 0.33 % GEL Apply 1 Applicatorful topically daily 1 Tube 1    venlafaxine (EFFEXOR XR) 75 MG extended release capsule TAKE 1 CAPSULE DAILY 90 capsule 3    naproxen-esomeprazole (VIMOVO) 500-20 MG TBEC Take 1 tablet by mouth 2 times daily as needed (pain) 60 tablet 5    cetirizine (ZYRTEC ALLERGY) 10 MG tablet Take 1 tablet by mouth daily (Patient not taking: Reported on 7/20/2021) 30 tablet 11     No current facility-administered medications on file prior to visit. Patient is following up after right SI injection done on 7/2/2021. She has had at least 80% improvement in pain. She is able to do more things and feels she can manage her life with the pain as it is today. She has a history of fusion L4-5 in 2016. She declines MRI at this time because she is feeling well. She has an SI belt which offers some relief, most of her pain is while sitting. She takes ibuprofen sparingly and tizanidine 2 mg at bedtime as needed. X-ray November 2019 postop changes at L4-5 no instability. Review of Systems   Constitutional: Negative. Negative for activity change and unexpected weight change. HENT: Negative. Respiratory: Negative. Gastrointestinal: Negative for abdominal pain. Genitourinary: Negative. Musculoskeletal: Positive for back pain. Negative for gait problem, joint swelling, neck pain and neck stiffness. Skin: Negative for color change and rash. Neurological: Negative. Negative for dizziness, seizures, weakness, light-headedness, numbness and headaches. Psychiatric/Behavioral: Negative. Negative for sleep disturbance. Objective:     Vitals:  /64 (Site: Left Upper Arm, Position: Sitting, Cuff Size: Medium Adult)   Temp 97.3 °F (36.3 °C) (Infrared)   Ht 5' 2\" (1.575 m)   Wt 166 lb (75.3 kg)   LMP  (LMP Unknown)   BMI 30.36 kg/m² Pain Score:   2    Physical Exam  Vitals reviewed. Constitutional:       Appearance: She is well-developed. HENT:      Head: Normocephalic. Nose: Nose normal.   Pulmonary:      Effort: Pulmonary effort is normal.   Musculoskeletal:      Cervical back: Normal range of motion and neck supple. Lumbar back: Tenderness present. Decreased range of motion.  Negative

## 2021-10-19 ENCOUNTER — OFFICE VISIT (OUTPATIENT)
Dept: NEUROSURGERY | Age: 61
End: 2021-10-19
Payer: COMMERCIAL

## 2021-10-19 VITALS — BODY MASS INDEX: 30.36 KG/M2 | HEIGHT: 62 IN | WEIGHT: 165 LBS | TEMPERATURE: 97.1 F

## 2021-10-19 DIAGNOSIS — M96.1 POST LAMINECTOMY SYNDROME: ICD-10-CM

## 2021-10-19 DIAGNOSIS — M47.817 LUMBOSACRAL SPONDYLOSIS WITHOUT MYELOPATHY: ICD-10-CM

## 2021-10-19 DIAGNOSIS — M46.1 SACROILIITIS (HCC): Primary | ICD-10-CM

## 2021-10-19 PROCEDURE — 99214 OFFICE O/P EST MOD 30 MIN: CPT | Performed by: NURSE PRACTITIONER

## 2021-10-19 RX ORDER — TIZANIDINE 2 MG/1
2 TABLET ORAL NIGHTLY PRN
Qty: 30 TABLET | Refills: 1 | Status: SHIPPED | OUTPATIENT
Start: 2021-10-19 | End: 2021-11-18

## 2021-10-19 RX ORDER — CELECOXIB 100 MG/1
100 CAPSULE ORAL DAILY
Qty: 60 CAPSULE | Refills: 0 | Status: SHIPPED | OUTPATIENT
Start: 2021-10-19 | End: 2021-11-16

## 2021-10-19 ASSESSMENT — ENCOUNTER SYMPTOMS
DIARRHEA: 0
RESPIRATORY NEGATIVE: 1
BACK PAIN: 1
CONSTIPATION: 0

## 2021-10-19 NOTE — PROGRESS NOTES
Patient: Kisha Samaniego  YOB: 1960  Date: 10/19/21        Subjective:     Kisha Samaniego is a 64 y.o. female who complains today of:    Chief Complaint   Patient presents with    Back Pain         Allergies:  Codeine and Lipitor [atorvastatin]    Past Medical History:   Diagnosis Date    Anxiety     CAD (coronary artery disease)     past episodes chest pains / evaluation by [facility] / holter monitor superventricular premature beats    CAD (coronary artery disease)     no stents / last appt > 2 yrs    Chronic back pain     Depression     Hyperlipidemia     past trx > 5 yrs /stopped Lipitor due to muscle & joint pain    Hypothyroidism     meds < 5 yrs    Osteoarthritis     major joints & back    PONV (postoperative nausea and vomiting)     Retention of urine      Past Surgical History:   Procedure Laterality Date    BREAST SURGERY  age 52    exc.  benign left breast mass    [facility]ATION  2007    [facility]    CARPAL TUNNEL RELEASE Right 1986    CHOLECYSTECTOMY      COLONOSCOPY  9/10/12    DR Darci Avilze    COLONOSCOPY  07/24/2017    Karla Gorman MD    HYSTERECTOMY  7-2009    LUMBAR FUSION N/A 11/7/2016    LEFT AND BILATERAL L4-5 MICRODISSECTION DECOMPRESSION DISKECTOMY INTERBODY POSTEROLATERAL FUSION PEDICLE SCREWS performed by Angelina Yi MD at [address] [facility] DX W/COLLJ [address] BR/ [facility] PRFRMD N/A 4/19/2018    ANOSCOPY performed by Jesse Kirby MD at [address] [facility] HEMORRHOIDECTOMY,INT/EXT, 2+ COLUMNS/GROUPS N/A 4/19/2018    See op note    AL PROCTOSIGMOIDOSCOPY,RIGID,DIAGNOS N/A 4/19/2018    RIGID SIGMOIDOSCOPY performed by Jesse Kirby MD at [facility] [facility] RECTAL SURGERY  2005    TEAR/THROMBOSIS    SKIN CANCER EXCISION  1983    BASAL CELL /     TONSILLECTOMY      WISDOM TOOTH EXTRACTION  1985     Family History   Problem Relation Age of Onset    Mental Illness Mother     Alcohol Abuse Father     Arthritis Sister     Mental Illness Brother      Social History Socioeconomic History    Marital status:      Spouse name: Not on file    Number of children: Not on file    Years of education: Not on file    Highest education level: Not on file   Occupational History    Not on file   Tobacco Use    Smoking status: Never Smoker    Smokeless tobacco: Never Used   Substance and Sexual Activity    Alcohol use: No    Drug use: No    Sexual activity: Not on file   Other Topics Concern    Not on file   Social History Narrative    Not on file     Social Determinants of Health     Financial Resource Strain:     Difficulty of Paying Living Expenses:    Food Insecurity:     Worried About Running Out of Food in the Last Year:     [address] in the Last Year:    Transportation Needs:     Lack of Transportation (Medical):      Lack of Transportation (Non-Medical):    Physical Activity:     Days of Exercise per Week:     Minutes of Exercise per Session:    Stress:     Feeling of Stress :    Social Connections:     Frequency of Communication with Friends and Family:     Frequency of Social Gatherings with Friends and Family:     Attends [employer] Services:     Active Member of Clubs or Organizations:     Attends Club or Organization Meetings:     Marital Status:    Intimate Partner Violence:     Fear of Current or Ex-Partner:     Emotionally Abused:     Physically Abused:     Sexually Abused:        Current Outpatient Medications on File Prior to Visit   Medication Sig Dispense Refill    ibuprofen (ADVIL;MOTRIN) 600 MG tablet Take 1 tablet by mouth 3 times daily as needed for Pain 90 tablet 5    levothyroxine (SYNTHROID) 75 MCG tablet TAKE 1 TABLET DAILY 90 tablet 1    Brimonidine Tartrate 0.33 % GEL Apply 1 Applicatorful topically daily 1 Tube 1    venlafaxine (EFFEXOR XR) 75 MG extended release capsule TAKE 1 CAPSULE DAILY 90 capsule 3    naproxen-esomeprazole (VIMOVO) 500-20 MG TBEC Take 1 tablet by mouth 2 times daily as needed (pain) 60 SLR Negative.    + Yasir  + Thigh Thrust  + Compression   Lymphadenopathy:      Cervical: No cervical adenopathy. Skin:     General: Skin is warm and dry. Findings: No erythema or rash. Neurological:      Mental Status: She is alert and oriented to person, place, and time. Cranial Nerves: No cranial nerve deficit. Deep Tendon Reflexes: Reflexes are normal and symmetric. Reflexes normal.   Psychiatric:         Mood and Affect: Mood normal.         Behavior: Behavior normal.         Thought Content: Thought content normal.         Judgment: Judgment normal.            Assessment:        Diagnosis Orders   1. Sacroiliitis (HCC)  HI INJECT SI JOINT ARTHRGRPHY&/ANES/STEROID W/IMAGE   2. Lumbosacral spondylosis without myelopathy  tiZANidine (ZANAFLEX) 2 MG tablet   3. Post laminectomy syndrome  tiZANidine (ZANAFLEX) 2 MG tablet       Plan:          Orders Placed This Encounter   Medications    tiZANidine (ZANAFLEX) 2 MG tablet     Sig: Take 1 tablet by mouth nightly as needed (pain)     Dispense:  30 tablet     Refill:  1    celecoxib (CELEBREX) 100 MG capsule     Sig: Take 1 capsule by mouth daily No other nsaids     Dispense:  60 capsule     Refill:  0       Orders Placed This Encounter   Procedures    HI INJECT SI JOINT ARTHRGRPHY&/ANES/STEROID W/IMAGE     Bilateral SI with SK     Standing Status:   Future     Standing Expiration Date:   1/17/2022     We again discussed MBB. She is afraid of the procedure. I gave her written information for her to research. Refill tizanidine 2 mg at bedtime as needed spasm, #30 with 1 refill.  -trial Celebrex 100mg with food daily, no other nsaids    Follow up:  Return in about 4 weeks (around 11/16/2021) for f/u after procedure and reassess pain/medications.     Gurvinder Phillips, APRN - CNP

## 2021-10-20 ENCOUNTER — TELEPHONE (OUTPATIENT)
Dept: PAIN MANAGEMENT | Age: 61
End: 2021-10-20

## 2021-10-20 NOTE — TELEPHONE ENCOUNTER
CYNTHIA SI JOINT INJ    NO AUTH REQUIRED    OK to schedule procedure approved as above. Please note sides/levels approved and date range.    (If applicable, sides/levels approved may differ from those ordered)    TO BE SCHEDULED WITH DR. Meme Dumas

## 2021-10-20 NOTE — TELEPHONE ENCOUNTER
ORDER PLACED:    Date: 10/19/21  Description: BILAT SI JOINT INJ   Order Number: [id number]  Ordering Provider: Chantale Boss  Performing Provider: CAROLINE  CPT Codes: 03007  ICD10 Codes: M46.1

## 2021-10-21 ENCOUNTER — TELEPHONE (OUTPATIENT)
Dept: PAIN MANAGEMENT | Age: 61
End: 2021-10-21

## 2021-10-21 NOTE — TELEPHONE ENCOUNTER
I received a faxed PA request via email for 'celecoxib 100 MG capsule'. I started a PA via 'CoverMyMeds' - sent to [employer].     Key: Beverly Stanley

## 2021-10-21 NOTE — TELEPHONE ENCOUNTER
Approved today  CaseId:[id number];Status:Approved; Review Type:Prior Auth; Coverage Start Date:09/21/2021; Coverage End Date:10/21/2022;

## 2021-11-04 ENCOUNTER — TELEPHONE (OUTPATIENT)
Dept: PAIN MANAGEMENT | Age: 61
End: 2021-11-04

## 2021-11-04 NOTE — TELEPHONE ENCOUNTER
BENEFITS: JOAO SI JOINT INJ    Insurance: [id number] [facility]  Phone: [phone]  Contact Name: Stanley Galicia  Effective Date: 1.1.2020     Plan year: YES-CALENDAR  Deductible: N/A      Deductible Met: N/A  Allowed/benefits paid at: 100%  OOP: 2000.00 MET $728.60  Freq Limits: 27096-BASED ON MEDICAL NECESSITY  Prior Auth Requirement: NO     Notes: NO PRE-EX CLAUSE    Call Reference #: [id number]    Time of call: 11:00AM

## 2021-11-09 ENCOUNTER — PROCEDURE VISIT (OUTPATIENT)
Dept: PAIN MANAGEMENT | Age: 61
End: 2021-11-09
Payer: COMMERCIAL

## 2021-11-09 DIAGNOSIS — M46.1 SACROILIITIS (HCC): ICD-10-CM

## 2021-11-09 PROCEDURE — 27096 INJECT SACROILIAC JOINT: CPT | Performed by: PAIN MEDICINE

## 2021-11-09 RX ORDER — LIDOCAINE HYDROCHLORIDE 10 MG/ML
10 INJECTION, SOLUTION EPIDURAL; INFILTRATION; INTRACAUDAL; PERINEURAL ONCE
Status: COMPLETED | OUTPATIENT
Start: 2021-11-09 | End: 2021-11-09

## 2021-11-09 RX ORDER — BETAMETHASONE SODIUM PHOSPHATE AND BETAMETHASONE ACETATE 3; 3 MG/ML; MG/ML
6 INJECTION, SUSPENSION INTRA-ARTICULAR; INTRALESIONAL; INTRAMUSCULAR; SOFT TISSUE ONCE
Status: COMPLETED | OUTPATIENT
Start: 2021-11-09 | End: 2021-11-09

## 2021-11-09 RX ADMIN — BETAMETHASONE SODIUM PHOSPHATE AND BETAMETHASONE ACETATE 6 MG: 3; 3 INJECTION, SUSPENSION INTRA-ARTICULAR; INTRALESIONAL; INTRAMUSCULAR; SOFT TISSUE at 08:15

## 2021-11-09 RX ADMIN — LIDOCAINE HYDROCHLORIDE 10 MG: 10 INJECTION, SOLUTION EPIDURAL; INFILTRATION; INTRACAUDAL; PERINEURAL at 08:14

## 2021-11-09 NOTE — PROGRESS NOTES
[facility]) Physicians  Neurosurgery and Pain [address] [facility] [place] [address]: [phone]  F: [phone]      Patient Name: Tim Alfaro  : 1960     Date:  2021      Physician: Mary Butler DO        Tim Alfaro is here today for interventional pain management. Preoperatively, the patient presents with SI joint mediated pain, as per history and exam. Patient has failed NSAIDs, PT, and conservative treatment. Patient has significant psychological and functional impairment due to this condition. Standard ASIPP guidelines were followed and sterile technique used. Area was cleaned with Betadine x3. Informed consent was obtained. Fluoroscopic guidance was used for this procedure. Majority of symptoms on the right side. S.I. JOINT:  right  Appropriate length spinal needle was advanced to the S.I. Joint. Negative aspiration was achieved. In total, approximately 6mg of Betamethasone and 2ml of 1% preservative free Lidocaine was injected without difficulty. Patient tolerated the procedure well, no obvious complications occurred during the procedure. Patient was appropriately monitored and discharged home in stable condition with their usual motor strength. Post Op Instructions were given to patient. Relevant and recent imaging reviewed with patient today.                                       Mary Butler DO

## 2021-11-16 ENCOUNTER — OFFICE VISIT (OUTPATIENT)
Dept: NEUROSURGERY | Age: 61
End: 2021-11-16
Payer: COMMERCIAL

## 2021-11-16 VITALS — HEIGHT: 62 IN | BODY MASS INDEX: 30.55 KG/M2 | WEIGHT: 166 LBS | TEMPERATURE: 97.3 F

## 2021-11-16 DIAGNOSIS — M46.1 SACROILIITIS (HCC): Primary | ICD-10-CM

## 2021-11-16 DIAGNOSIS — M96.1 POST LAMINECTOMY SYNDROME: ICD-10-CM

## 2021-11-16 DIAGNOSIS — M47.817 LUMBOSACRAL SPONDYLOSIS WITHOUT MYELOPATHY: ICD-10-CM

## 2021-11-16 PROCEDURE — 99213 OFFICE O/P EST LOW 20 MIN: CPT | Performed by: NURSE PRACTITIONER

## 2021-11-16 ASSESSMENT — ENCOUNTER SYMPTOMS
RESPIRATORY NEGATIVE: 1
DIARRHEA: 0
BACK PAIN: 1
CONSTIPATION: 0

## 2021-11-16 NOTE — PROGRESS NOTES
Patient: Anne Sage  YOB: 1960  Date: 11/16/21        Subjective:     Anne Sage is a 64 y.o. female who complains today of:    Chief Complaint   Patient presents with    Leg Pain    Back Pain         Allergies:  Codeine and Lipitor [atorvastatin]    Past Medical History:   Diagnosis Date    Anxiety     CAD (coronary artery disease)     past episodes chest pains / evaluation by [facility] / holter monitor superventricular premature beats    CAD (coronary artery disease)     no stents / last appt > 2 yrs    Chronic back pain     Depression     Hyperlipidemia     past trx > 5 yrs /stopped Lipitor due to muscle & joint pain    Hypothyroidism     meds < 5 yrs    Osteoarthritis     major joints & back    PONV (postoperative nausea and vomiting)     Retention of urine      Past Surgical History:   Procedure Laterality Date    BREAST SURGERY  age 52    exc.  benign left breast mass    [facility]ARDIAC [facility]TERIZATION  2007    [facility]    CARPAL TUNNEL RELEASE Right 1986    CHOLECYSTECTOMY      COLONOSCOPY  9/10/12    DR Lu Ahn    COLONOSCOPY  07/24/2017    Ivett Crooks MD    HYSTERECTOMY  7-2009    LUMBAR FUSION N/A 11/7/2016    LEFT AND BILATERAL L4-5 MICRODISSECTION DECOMPRESSION DISKECTOMY INTERBODY POSTEROLATERAL FUSION PEDICLE SCREWS performed by Elaine Ag MD at [address] [facility] DX W/COLLJ [address]/ [facility] PRFRMD N/A 4/19/2018    ANOSCOPY performed by Kerrie Crigler, MD at [facility] HEMORRHOIDECTOMY,INT/EXT, 2+ COLUMNS/GROUPS N/A 4/19/2018    See op note    MD PROCTOSIGMOIDOSCOPY,RIGID,DIAGNOS N/A 4/19/2018    RIGID SIGMOIDOSCOPY performed by Kerrie Crigler, MD at [facility] [facility] RECTAL SURGERY  2005    TEAR/THROMBOSIS    SKIN CANCER EXCISION  1983    BASAL CELL /     TONSILLECTOMY      WISDOM TOOTH EXTRACTION  1985     Family History   Problem Relation Age of Onset    Mental Illness Mother     Alcohol Abuse Father     Arthritis Sister     Mental Illness Brother nightly as needed (pain) 30 tablet 1    ibuprofen (ADVIL;MOTRIN) 600 MG tablet Take 1 tablet by mouth 3 times daily as needed for Pain 90 tablet 5    levothyroxine (SYNTHROID) 75 MCG tablet TAKE 1 TABLET DAILY 90 tablet 1    Brimonidine Tartrate 0.33 % GEL Apply 1 Applicatorful topically daily 1 Tube 1    venlafaxine (EFFEXOR XR) 75 MG extended release capsule TAKE 1 CAPSULE DAILY 90 capsule 3    naproxen-esomeprazole (VIMOVO) 500-20 MG TBEC Take 1 tablet by mouth 2 times daily as needed (pain) 60 tablet 5    cetirizine (ZYRTEC ALLERGY) 10 MG tablet Take 1 tablet by mouth daily 30 tablet 11     No current facility-administered medications on file prior to visit. 42-year-old female following up today after having a right SI joint injection on 2021-11-09. This was very effective for her at least about 75%. She has a history of PLIF L4-5 in 2016. She has an SI belt that she wears most of the time when she is active. We tried Celebrex 100 mg but it caused stomach issues. She seems to  tolerate ibuprofen sparingly. She also takes tizanidine 2 mg at bedtime sparingly. X-ray November 2019 postop changes at L4-5 with no instability. MRI was ordered for her this year twice but she declines that at this point. Review of Systems   Constitutional: Negative. Negative for activity change and unexpected weight change. HENT: Negative. Negative for hearing loss. Respiratory: Negative. Cardiovascular: Negative for leg swelling. Gastrointestinal: Negative for constipation and diarrhea. Genitourinary: Negative. Musculoskeletal: Positive for back pain. Negative for gait problem, joint swelling and neck pain. Skin: Negative for rash. Neurological: Negative for dizziness, weakness, numbness and headaches. Psychiatric/Behavioral: Negative. Negative for sleep disturbance.          Objective:     Vitals:  Temp 97.3 °F (36.3 °C) (Temporal)   Ht 5' 2\" (1.575 m)   Wt 166 lb (75.3 kg)   LMP (LMP Unknown)   BMI 30.36 kg/m²      Physical Exam  Vitals reviewed. Constitutional:       Appearance: She is well-developed. HENT:      Head: Normocephalic. Nose: Nose normal.   Pulmonary:      Effort: Pulmonary effort is normal.   Musculoskeletal:      Cervical back: Normal range of motion and neck supple. Lumbar back: Tenderness present. Negative right straight leg raise test and negative left straight leg raise test.      Comments: Mildly tender at the right SI joint   Lymphadenopathy:      Cervical: No cervical adenopathy. Skin:     General: Skin is warm and dry. Findings: No erythema or rash. Neurological:      Mental Status: She is alert and oriented to person, place, and time. Cranial Nerves: No cranial nerve deficit. Deep Tendon Reflexes: Reflexes are normal and symmetric. Reflexes normal.   Psychiatric:         Mood and Affect: Mood normal.         Behavior: Behavior normal.         Thought Content: Thought content normal.         Judgment: Judgment normal.            Assessment:        Diagnosis Orders   1. Sacroiliitis ([address]     2. Lumbosacral spondylosis without myelopathy     3. Post laminectomy syndrome         Plan:          No orders of the defined types were placed in this encounter. No orders of the defined types were placed in this encounter. She will come in again when the pain returns. I also reviewed lumbar facet injections, lumbar MBB, and sacral MBB. She declines any of those options at this time. Follow up:  Return if symptoms worsen or fail to improve.     CORBY Seo - CNP

## 2022-03-28 DIAGNOSIS — M96.1 POST LAMINECTOMY SYNDROME: ICD-10-CM

## 2022-03-28 DIAGNOSIS — M47.817 LUMBOSACRAL SPONDYLOSIS WITHOUT MYELOPATHY: ICD-10-CM

## 2022-03-28 RX ORDER — TIZANIDINE 2 MG/1
TABLET ORAL
Qty: 30 TABLET | Refills: 11 | OUTPATIENT
Start: 2022-03-28

## 2022-03-30 ENCOUNTER — OFFICE VISIT (OUTPATIENT)
Dept: PAIN MANAGEMENT | Age: 62
End: 2022-03-30
Payer: COMMERCIAL

## 2022-03-30 ENCOUNTER — TELEPHONE (OUTPATIENT)
Dept: PAIN MANAGEMENT | Age: 62
End: 2022-03-30

## 2022-03-30 VITALS
WEIGHT: 165 LBS | TEMPERATURE: 97.1 F | BODY MASS INDEX: 30.36 KG/M2 | SYSTOLIC BLOOD PRESSURE: 118 MMHG | HEIGHT: 62 IN | DIASTOLIC BLOOD PRESSURE: 78 MMHG

## 2022-03-30 DIAGNOSIS — M46.1 SACROILIITIS (HCC): Primary | ICD-10-CM

## 2022-03-30 PROCEDURE — 99214 OFFICE O/P EST MOD 30 MIN: CPT | Performed by: NURSE PRACTITIONER

## 2022-03-30 ASSESSMENT — ENCOUNTER SYMPTOMS: BACK PAIN: 1

## 2022-03-30 NOTE — TELEPHONE ENCOUNTER
ORDER PLACED:    Date: 3/30/22  Description: BILAT SI JOINT INJ  Order Number: [id number]  Ordering Provider: Farrukh Huerta  Performing Provider: MOLLY  CPT Codes: 51037  ICD10 Codes: M46.1

## 2022-03-30 NOTE — PROGRESS NOTES
Patient: Erika Smith  YOB: 1960  Date: 3/30/22        Subjective:     Erika Smith is a 64 y.o. female who complains today of:    Chief Complaint   Patient presents with    Back Pain         Allergies:  Codeine and Lipitor [atorvastatin]    Past Medical History:   Diagnosis Date    Anxiety     CAD (coronary artery disease)     past episodes chest pains / evaluation by [facility] / holter monitor superventricular premature beats    CAD (coronary artery disease)     no stents / last appt > 2 yrs    Chronic back pain     Depression     Hyperlipidemia     past trx > 5 yrs /stopped Lipitor due to muscle & joint pain    Hypothyroidism     meds < 5 yrs    Osteoarthritis     major joints & back    PONV (postoperative nausea and vomiting)     Retention of urine      Past Surgical History:   Procedure Laterality Date    BREAST SURGERY  age 52    exc.  benign left breast mass    [facility]IAC [facility]IZATION  2007    [facility]    CARPAL TUNNEL RELEASE Right 1986    CHOLECYSTECTOMY      COLONOSCOPY  9/10/12    DR Freddie Hampton    COLONOSCOPY  07/24/2017    Kirsten Huitron MD    HYSTERECTOMY  7-2009    LUMBAR FUSION N/A 11/7/2016    LEFT AND BILATERAL L4-5 MICRODISSECTION DECOMPRESSION DISKECTOMY INTERBODY POSTEROLATERAL FUSION PEDICLE SCREWS performed by Haleigh Bustillo MD at [address] DX W[address]/ [facility] PRFRMD N/A 4/19/2018    ANOSCOPY performed by Rahat Stokes MD at [address] [facility] HEMORRHOIDECTOMY,INT/EXT, 2+ COLUMNS/GROUPS N/A 4/19/2018    See op note    NM PROCTOSIGMOIDOSCOPY,RIGID,DIAGNOS N/A 4/19/2018    RIGID SIGMOIDOSCOPY performed by Rahat Stokes MD at [address] [facility] RECTAL SURGERY  2005    TEAR/THROMBOSIS    SKIN CANCER EXCISION  1983    BASAL CELL /     TONSILLECTOMY      WISDOM TOOTH EXTRACTION  1985     Family History   Problem Relation Age of Onset    Mental Illness Mother     Alcohol Abuse Father     Arthritis Sister     Mental Illness Brother      Social History Socioeconomic History    Marital status:      Spouse name: Not on file    Number of children: Not on file    Years of education: Not on file    Highest education level: Not on file   Occupational History    Not on file   Tobacco Use    Smoking status: Never Smoker    Smokeless tobacco: Never Used   Substance and Sexual Activity    Alcohol use: No    Drug use: No    Sexual activity: Not on file   Other Topics Concern    Not on file   Social History Narrative    Not on file     Social Determinants of Health     Financial Resource Strain:     Difficulty of Paying Living Expenses: Not on file   Food Insecurity:     Worried About Running Out of Food in the Last Year: Not on file    Anton of Food in the Last Year: Not on file   Transportation Needs:     Lack of Transportation (Medical): Not on file    Lack of Transportation (Non-Medical):  Not on file   Physical Activity:     Days of Exercise per Week: Not on file    Minutes of Exercise per Session: Not on file   Stress:     Feeling of Stress : Not on file   Social Connections:     Frequency of Communication with Friends and Family: Not on file    Frequency of Social Gatherings with Friends and Family: Not on file    Attends Christianity Services: Not on file    Active Member of [address] [place] [address] [employer] or Organizations: Not on file    Attends Club or Organization Meetings: Not on file    Marital Status: Not on file   Intimate Partner Violence:     Fear of Current or Ex-Partner: Not on file    Emotionally Abused: Not on file    Physically Abused: Not on file    Sexually Abused: Not on file   Housing Stability:     Unable to Pay for Housing in the Last Year: Not on file    Number of [place] in the Last Year: Not on file    Unstable Housing in the Last Year: Not on file       Current Outpatient Medications on File Prior to Visit   Medication Sig Dispense Refill    ibuprofen (ADVIL;MOTRIN) 600 MG tablet Take 1 tablet by mouth 3 times daily as needed for Pain 90 tablet 5    levothyroxine (SYNTHROID) 75 MCG tablet TAKE 1 TABLET DAILY 90 tablet 1    Brimonidine Tartrate 0.33 % GEL Apply 1 Applicatorful topically daily 1 Tube 1    venlafaxine (EFFEXOR XR) 75 MG extended release capsule TAKE 1 CAPSULE DAILY 90 capsule 3    naproxen-esomeprazole (VIMOVO) 500-20 MG TBEC Take 1 tablet by mouth 2 times daily as needed (pain) 60 tablet 5    cetirizine (ZYRTEC ALLERGY) 10 MG tablet Take 1 tablet by mouth daily 30 tablet 11     No current facility-administered medications on file prior to visit. 19-year-old female following up today for chronic low back pain last seen 11/16/2021. She has a history of PLIF L4-5 in 2016. Pain to both sides of low back. No leg Pain. She has an SI belt that she wears most of the time when she is active and at work. She takes tizanidine 2 mg at bedtime sparingly. Last x-ray done November 2019 with postop changes at L4-5 with no instability. She has declined MRIs in the past.    Back Pain        Review of Systems   Musculoskeletal: Positive for back pain. Objective:     Vitals:  /78   Temp 97.1 °F (36.2 °C)   Ht 5' 2\" (1.575 m)   Wt 165 lb (74.8 kg)   LMP  (LMP Unknown)   BMI 30.18 kg/m² Pain Score:   6    Physical Exam  Vitals reviewed. Constitutional:       Appearance: She is well-developed. HENT:      Head: Normocephalic. Nose: Nose normal.   Pulmonary:      Effort: Pulmonary effort is normal.   Musculoskeletal:      Cervical back: Normal range of motion and neck supple. Lumbar back: Tenderness present. Decreased range of motion. Negative right straight leg raise test and negative left straight leg raise test.      Comments: Pain Over S I joint with (+) provacative manuvers. SLR Negative.    + Yasir  + Thigh Thrust  + Compression   Lymphadenopathy:      Cervical: No cervical adenopathy. Skin:     General: Skin is warm and dry. Findings: No erythema or rash.    Neurological: Mental Status: She is alert and oriented to person, place, and time. Cranial Nerves: No cranial nerve deficit. Deep Tendon Reflexes: Reflexes are normal and symmetric. Reflexes normal.   Psychiatric:         Mood and Affect: Mood normal.         Behavior: Behavior normal.         Thought Content: Thought content normal.         Judgment: Judgment normal.            Assessment:        Diagnosis Orders   1. Sacroiliitis ([facility] [address]  CT INJECT SI JOINT ARTHRGRPHY&/ANES/STEROID W/IMAGE       Plan:          No orders of the defined types were placed in this encounter. Orders Placed This Encounter   Procedures    CT INJECT SI JOINT ARTHRGRPHY&/ANES/STEROID W/IMAGE     Standing Status:   Future     Standing Expiration Date:   6/28/2022     she has flared in the sacroiliac joint. she has failed conservative treatment in the past. We will set the patient up for bilateral S.I. Joint injection. Anatomic model of pathology was shown. Risks and benefits of the procedure were discussed. All questions were answered and patient understands and agrees with the plan. Follow up:  Return for f/u after procedure and reassess pain/medications.     Denise Velasquez, CORBY - CNP

## 2022-03-30 NOTE — TELEPHONE ENCOUNTER
CYNTHIA SI JOINT INJ    NO AUTH REQUIRED    OK to schedule procedure approved as above. Please note sides/levels approved and date range.    (If applicable, sides/levels approved may differ from those ordered)    TO BE SCHEDULED WITH DR. Violette Cockayne

## 2022-03-31 ENCOUNTER — TELEPHONE (OUTPATIENT)
Dept: PAIN MANAGEMENT | Age: 62
End: 2022-03-31

## 2022-03-31 NOTE — TELEPHONE ENCOUNTER
BENEFITS: JOAO SI JOINT INJ    Insurance: [id number]  Phone: [phone]  Contact Name: Sina Talley  Effective Date: 1.1.2020     Plan year: YES-CALENDAR  Deductible: 0.00      Deductible Met: 0.00  Allowed/benefits paid at: 100% AFTER $30.00 COPAY  OOP: 2000.00 MET $126.85  Freq Limits: 27096--BASED ON MEDICAL NECESSITY  Prior Auth Requirement: NO AUTH REQUIRED    Notes: NO PRE-EX CLAUSE    Call Reference #: [id number]    Time of call: 9:45AM

## 2022-04-05 ENCOUNTER — PROCEDURE VISIT (OUTPATIENT)
Dept: PAIN MANAGEMENT | Age: 62
End: 2022-04-05
Payer: COMMERCIAL

## 2022-04-05 DIAGNOSIS — M46.1 SACROILIITIS (HCC): ICD-10-CM

## 2022-04-05 PROCEDURE — 27096 INJECT SACROILIAC JOINT: CPT | Performed by: PAIN MEDICINE

## 2022-04-05 RX ORDER — BETAMETHASONE SODIUM PHOSPHATE AND BETAMETHASONE ACETATE 3; 3 MG/ML; MG/ML
6 INJECTION, SUSPENSION INTRA-ARTICULAR; INTRALESIONAL; INTRAMUSCULAR; SOFT TISSUE ONCE
Status: COMPLETED | OUTPATIENT
Start: 2022-04-05 | End: 2022-04-05

## 2022-04-05 RX ORDER — LIDOCAINE HYDROCHLORIDE 10 MG/ML
10 INJECTION, SOLUTION EPIDURAL; INFILTRATION; INTRACAUDAL; PERINEURAL ONCE
Status: COMPLETED | OUTPATIENT
Start: 2022-04-05 | End: 2022-04-05

## 2022-04-05 RX ADMIN — BETAMETHASONE SODIUM PHOSPHATE AND BETAMETHASONE ACETATE 6 MG: 3; 3 INJECTION, SUSPENSION INTRA-ARTICULAR; INTRALESIONAL; INTRAMUSCULAR; SOFT TISSUE at 09:22

## 2022-04-05 RX ADMIN — LIDOCAINE HYDROCHLORIDE 10 MG: 10 INJECTION, SOLUTION EPIDURAL; INFILTRATION; INTRACAUDAL; PERINEURAL at 09:22

## 2022-04-05 NOTE — PROGRESS NOTES
[facility]) Physicians  Neurosurgery and Pain [address] [place], [facility]: [phone]  F: [phone]      Patient Name: Gisselle Davis  : 1960     Date:  2022      Physician: Sam Pinto DO        Gisselle Davis is here today for interventional pain management. Preoperatively, the patient presents with SI joint mediated pain, as per history and exam. Patient has failed NSAIDs, PT, and conservative treatment. Patient has significant psychological and functional impairment due to this condition. Standard ASI guidelines were followed and sterile technique used. Area was cleaned with Betadine x3. Informed consent was obtained. Fluoroscopic guidance was used for this procedure. S.I. JOINT:  Bilateral  Appropriate length spinal needle was advanced to the S.I. Joint. Negative aspiration was achieved. In total, approximately 6mg of Betamethasone and 2ml of 1% preservative free Lidocaine was injected without difficulty. Patient tolerated the procedure well, no obvious complications occurred during the procedure. Patient was appropriately monitored and discharged home in stable condition with their usual motor strength. Post Op Instructions were given to patient. Relevant and recent imaging reviewed with patient today.                                       Sam Pinto DO

## 2022-07-28 ENCOUNTER — OFFICE VISIT (OUTPATIENT)
Dept: PAIN MANAGEMENT | Age: 62
End: 2022-07-28
Payer: COMMERCIAL

## 2022-07-28 VITALS
HEIGHT: 62 IN | DIASTOLIC BLOOD PRESSURE: 74 MMHG | WEIGHT: 164 LBS | BODY MASS INDEX: 30.18 KG/M2 | SYSTOLIC BLOOD PRESSURE: 102 MMHG | TEMPERATURE: 96.9 F

## 2022-07-28 DIAGNOSIS — M46.1 SACROILIITIS (HCC): Primary | ICD-10-CM

## 2022-07-28 PROCEDURE — 99214 OFFICE O/P EST MOD 30 MIN: CPT | Performed by: NURSE PRACTITIONER

## 2022-07-28 RX ORDER — SEMAGLUTIDE 1.34 MG/ML
INJECTION, SOLUTION SUBCUTANEOUS
COMMUNITY
Start: 2022-07-04

## 2022-07-28 ASSESSMENT — ENCOUNTER SYMPTOMS
SORE THROAT: 0
SHORTNESS OF BREATH: 0
BACK PAIN: 1
ABDOMINAL PAIN: 0

## 2022-07-28 NOTE — PROGRESS NOTES
Carlito Boy  ([id number])    7/28/2022    Subjective:     Carlito Nieto is 58 y.o. female who complains today of:    Chief Complaint   Patient presents with    Back Pain         Allergies:  Codeine and Lipitor [atorvastatin]    Past Medical History:   Diagnosis Date    Anxiety     CAD (coronary artery disease)     past episodes chest pains / evaluation by [facility] / holter monitor superventricular premature beats    CAD (coronary artery disease)     no stents / last appt > 2 yrs    Chronic back pain     Depression     Hyperlipidemia     past trx > 5 yrs /stopped Lipitor due to muscle & joint pain    Hypothyroidism     meds < 5 yrs    Osteoarthritis     major joints & back    PONV (postoperative nausea and vomiting)     Retention of urine      Past Surgical History:   Procedure Laterality Date    BREAST SURGERY  age 52    exc.  benign left breast mass    CARDIAC CATHETERIZATION  2007    [facility]    CARPAL TUNNEL RELEASE Right 1986    CHOLECYSTECTOMY      COLONOSCOPY  9/10/12    DR AMEZQUITA    COLONOSCOPY  07/24/2017    Rey Antony MD    HYSTERECTOMY (CERVIX STATUS UNKNOWN)  7-2009    LUMBAR FUSION N/A 11/7/2016    LEFT AND BILATERAL L4-5 MICRODISSECTION DECOMPRESSION DISKECTOMY INTERBODY POSTEROLATERAL FUSION PEDICLE SCREWS performed by Georgia Hurst MD at [facility] W/COLLJ [address] [facility] [address]/ [facility] PRFRMD N/A 4/19/2018    ANOSCOPY performed by Naresh Solorio MD at [address] [place] [address] [facility] HEMORRHOIDECTOM[facility],INT/EXT, 2+ COLUMNS/GROUPS N/A 4/19/2018    See op note    NC PROCTOSIGMOIDOSCOPY,RIGID,DIAGNOS N/A 4/19/2018    RIGID SIGMOIDOSCOPY performed by Naresh Solorio MD at [address] [place] [address]  2005    TEAR/THROMBOSIS    SKIN CANCER EXCISION  1983    BASAL CELL /     TONSILLECTOMY      WISDOM TOOTH EXTRACTION  1985     Family History   Problem Relation Age of Onset    Mental Illness Mother     Alcohol Abuse Father     Arthritis Sister     Mental Illness Brother      Social History     Socioeconomic History    Marital status:      Spouse name: Not on file    Number of children: Not on file    Years of education: Not on file    Highest education level: Not on file   Occupational History    Not on file   Tobacco Use    Smoking status: Never    Smokeless tobacco: Never   Substance and Sexual Activity    Alcohol use: No    Drug use: No    Sexual activity: Not on file   Other Topics Concern    Not on file   Social History Narrative    Not on file     Social Determinants of Health     Financial Resource Strain: Not on file   Food Insecurity: Not on file   Transportation Needs: Not on file   Physical Activity: Not on file   Stress: Not on file   Social Connections: Not on file   Intimate Partner Violence: Not on file   Housing Stability: Not on file       Current Outpatient Medications on File Prior to Visit   Medication Sig Dispense Refill    metFORMIN (GLUCOPHAGE) 500 MG tablet Take by mouth      OZEMPIC, 0.25 OR 0.5 MG/DOSE, 2 MG/1.5ML SOPN INJECT 0.25 mg EVERY WEEK for 2 (TWO) weeks, then INJECT 0.5 mg EVERY WEEK thereafter      ibuprofen (ADVIL;MOTRIN) 600 MG tablet Take 1 tablet by mouth 3 times daily as needed for Pain 90 tablet 5    levothyroxine (SYNTHROID) 75 MCG tablet TAKE 1 TABLET DAILY 90 tablet 1    Brimonidine Tartrate 0.33 % GEL Apply 1 Applicatorful topically daily 1 Tube 1    venlafaxine (EFFEXOR XR) 75 MG extended release capsule TAKE 1 CAPSULE DAILY 90 capsule 3    naproxen-esomeprazole (VIMOVO) 500-20 MG TBEC Take 1 tablet by mouth 2 times daily as needed (pain) 60 tablet 5    cetirizine (ZYRTEC ALLERGY) 10 MG tablet Take 1 tablet by mouth daily 30 tablet 11     No current facility-administered medications on file prior to visit. Pt presents today for a f/u of chronic low back pain. She is ready to repeat injections. She gets pain from the BL low back down to the posterior thigh on the right. Uses some Tylenol and Ibuprofen. She was started on Metformin for pre diabetes.  Pt feels that prolonged standing aggravates the pain. Pt denies radiating numbness and tingling. She has an SI belt that she wears most of the time when she is active and at work. She takes tizanidine 2 mg at bedtime sparingly. Last x-ray done November 2019 with postop changes at L4-5 with no instability. She has declined MRIs in the past.XR report of low back in November 2019 Postoperative changes at L4-5. D. Hx of 11/07/2016 left and bilateral L4-5 microdissection, decompression, discectomy, interbody posterolateral fusion, pedicle screws done at [facility]. Tried B/L L5-S1 facet injections with some relief in June. She says she couldn't tolerate the gabapentin. Continue using Tizanidine. 4/5/22 BL SI joint  11/9/21 R SI joint    Back Pain  Pertinent negatives include no abdominal pain, fever or headaches. Review of Systems   Constitutional:  Negative for fever. HENT:  Negative for congestion and sore throat. Respiratory:  Negative for shortness of breath. Gastrointestinal:  Negative for abdominal pain. Genitourinary:  Negative for difficulty urinating. Musculoskeletal:  Positive for back pain. Neurological:  Negative for speech difficulty and headaches. Hematological:  Negative for adenopathy. Psychiatric/Behavioral:  Negative for agitation. All other systems reviewed and are negative. Objective:     Vitals:  /74   Temp 96.9 °F (36.1 °C)   Ht 5' 2\" (1.575 m)   Wt 164 lb (74.4 kg)   LMP  (LMP Unknown)   BMI 30.00 kg/m² Pain Score:   7      Physical Exam  Vitals and nursing note reviewed. Pt is alert and oriented x 3. Recent and remote memory is intact. Mood, judgement and affect are normal.  No signs of distress or SOB noted. Visualized skin intact. Sensation intact to light touch. Positive BL compression test, Patricks test, and thigh thrust test.        Assessment:      Diagnosis Orders   1.  Sacroiliitis ([address]  KY SIO FLEX PELVIC/SACR PRE OTS    KY INJECT SI JOINT ARTHRGRPHY&/ANES/STEROID W/IMAGE          Plan:          No orders of the defined types were placed in this encounter. Orders Placed This Encounter   Procedures    MO SIO FLEX PELVIC/SACR PRE OTS     SI Belt     Standing Status:   Future     Standing Expiration Date:   10/26/2022    MO INJECT SI JOINT ARTHRGRPHY&/ANES/STEROID W/IMAGE     BL SI joint inj with SK     Standing Status:   Future     Standing Expiration Date:   10/26/2022     Discussed options with the patient today. Will repeat BL SI joint injection which previously gave 50% relief for almost 4 months. Her SI joint belt has worn out and will request a new one today. All questions were answered. Pt verbalized understanding and agrees with above plan. This NP saw pt under direct supervision of Dr. Zaire Cortes. Follow up:  Return if symptoms worsen or fail to improve.     CORBY Silva - CNP

## 2022-08-01 ENCOUNTER — TELEPHONE (OUTPATIENT)
Dept: PAIN MANAGEMENT | Age: 62
End: 2022-08-01

## 2022-08-01 NOTE — TELEPHONE ENCOUNTER
BILAT SI JOINT INJECTION    NO AUTH REQUIRED    OK to schedule procedure approved as above. Please note sides/levels approved and date range.    (If applicable, sides/levels approved may differ from those ordered)     [address]

## 2022-08-02 ENCOUNTER — TELEPHONE (OUTPATIENT)
Dept: PAIN MANAGEMENT | Age: 62
End: 2022-08-02

## 2022-08-02 NOTE — TELEPHONE ENCOUNTER
BENEFITS: BILATERAL SI JOINT INJ.     Insurance: MAR[employer]E  Phone: [phone]  Contact Name: Milagro Padilla  Effective Date: 1.1.2020     Plan year: YES-CALENDAR  Deductible: 200.00      Deductible Met: 165.45  Allowed/benefits paid at: 90% AFTER DEDUCTIBLE  OOP: 2000.00 MET $416.76  Freq Limits: 27096--BASED ON MEDICAL NECESSITY  Prior Auth Requirement: NO AUTH REQUIRED    Notes: NO PRE-EX CLAUSE    Call Reference #: [id number]    Time of call: 9:45AM

## 2022-08-09 ENCOUNTER — PROCEDURE VISIT (OUTPATIENT)
Dept: PAIN MANAGEMENT | Age: 62
End: 2022-08-09
Payer: COMMERCIAL

## 2022-08-09 DIAGNOSIS — M46.1 SACROILIITIS (HCC): ICD-10-CM

## 2022-08-09 PROCEDURE — 27096 INJECT SACROILIAC JOINT: CPT | Performed by: PAIN MEDICINE

## 2022-08-09 RX ORDER — BETAMETHASONE SODIUM PHOSPHATE AND BETAMETHASONE ACETATE 3; 3 MG/ML; MG/ML
6 INJECTION, SUSPENSION INTRA-ARTICULAR; INTRALESIONAL; INTRAMUSCULAR; SOFT TISSUE ONCE
Status: COMPLETED | OUTPATIENT
Start: 2022-08-09 | End: 2022-08-09

## 2022-08-09 RX ORDER — LIDOCAINE HYDROCHLORIDE 10 MG/ML
10 INJECTION, SOLUTION EPIDURAL; INFILTRATION; INTRACAUDAL; PERINEURAL ONCE
Status: COMPLETED | OUTPATIENT
Start: 2022-08-09 | End: 2022-08-09

## 2022-08-09 RX ADMIN — BETAMETHASONE SODIUM PHOSPHATE AND BETAMETHASONE ACETATE 6 MG: 3; 3 INJECTION, SUSPENSION INTRA-ARTICULAR; INTRALESIONAL; INTRAMUSCULAR; SOFT TISSUE at 08:44

## 2022-08-09 RX ADMIN — LIDOCAINE HYDROCHLORIDE 10 MG: 10 INJECTION, SOLUTION EPIDURAL; INFILTRATION; INTRACAUDAL; PERINEURAL at 08:44

## 2022-08-09 NOTE — PROGRESS NOTES
[facility]) Physicians  Neurosurgery and Pain [address] [facility] [place] [address]: [phone]  F: [phone]      Patient Name: Devika Noble  : 1960     Date:  2022      Physician: Josue Coronado DO        Devika Noble is here today for interventional pain management. Preoperatively, the patient presents with SI joint mediated pain, as per history and exam. Patient has failed NSAIDs, PT, and conservative treatment. Patient has significant psychological and functional impairment due to this condition. Standard ASIPP guidelines were followed and sterile technique used. Area was cleaned with Betadine x3. Informed consent was obtained. Fluoroscopic guidance was used for this procedure. S.I. JOINT:  Bilateral  Appropriate length spinal needle was advanced to the S.I. Joint. Negative aspiration was achieved. In total, approximately 6mg of Betamethasone and 2ml of 1% preservative free Lidocaine was injected without difficulty. Patient tolerated the procedure well, no obvious complications occurred during the procedure. Patient was appropriately monitored and discharged home in stable condition with their usual motor strength. Post Op Instructions were given to patient. Relevant and recent imaging reviewed with patient today.                                       Josue Coronado DO

## 2022-08-10 ENCOUNTER — TELEPHONE (OUTPATIENT)
Dept: PAIN MANAGEMENT | Age: 62
End: 2022-08-10

## 2022-08-10 NOTE — TELEPHONE ENCOUNTER
BENEFITS:    Insurance: MARLENE  Phone: [phone]  Contact Name: Eris Vieyra Date: 1/1/2020     Plan year: CAL YEAR  Deductible: $200      Deductible Met: $34.55  Allowed/benefits paid at: 80%  OOP: $2000 WITH $446.76 MET  Freq Limits: MEDICAL NECESSITY  Prior Auth Requirement: NO AUTH REQUIRED    Notes:     Call Reference #: [id number]    Time of call: 9:50AM

## 2022-08-10 NOTE — TELEPHONE ENCOUNTER
ORDER PLACED:    Date: 8/9/22  Description: SI BELT   Order Number: [id number]  Ordering Provider: WENDY  Performing Provider:   CPT Codes:   ICD10 Codes: M46.1

## 2022-08-19 NOTE — TELEPHONE ENCOUNTER
TRIED TO CALL THE PATIENT AND THE PHONE JUST RANG AND RANG AND THEN DISCONNECTED, IF PATIENT CALLS BACK PLEASE SET UP A TIME WITH THE PATIENT

## 2022-08-29 ENCOUNTER — CLINICAL DOCUMENTATION (OUTPATIENT)
Dept: SPORTS MEDICINE | Age: 62
End: 2022-08-29

## 2022-08-29 ENCOUNTER — CLINICAL DOCUMENTATION (OUTPATIENT)
Dept: PAIN MANAGEMENT | Age: 62
End: 2022-08-29

## 2022-08-31 NOTE — PROGRESS NOTES
[facility] [employer].  Spine Surgery  Advanced Pain Management           DME Coordinator: Lilia Ma        Patient Name: Ora Liu : 1960        Date: 2022       DME Fitting: Carmencita Chase -     The above brace has been ordered by Dr. Colin Morris for sacroiliitis. she will benefit from this brace as it helps to reduce pain by reducing mobility of the trunk and helps to support weak spinal musculature. she can wear it when being active in standing or walking. I have shown the patient the brace and demonstrated how to wear the brace and use the pulley system. I explained the versatility of this brace and how it can be adjusted to their needs. I advised the patient to use the brace when standing and walking, or as directed by their provider, to provide additional support. I advised the patient to adjust the brace for comfort and loosen or tighten as needed. I had the patient try on the brace and we assessed the fit. The patient was provided with a brace in size medium. Per my evaluation and discussion with the patient, this brace will work well for the patient. The patient expressed satisfaction with the fit of this brace and understanding of it's use. [address] [facility] [address]  Phone [phone]/[email or web address]

## 2022-09-21 DIAGNOSIS — M46.1 SACROILIITIS (HCC): ICD-10-CM

## 2023-04-03 DIAGNOSIS — E03.8 OTHER SPECIFIED HYPOTHYROIDISM: ICD-10-CM

## 2023-04-03 RX ORDER — LEVOTHYROXINE SODIUM 88 UG/1
TABLET ORAL
Qty: 90 TABLET | Refills: 0 | Status: SHIPPED | OUTPATIENT
Start: 2023-04-03 | End: 2023-06-30

## 2023-05-24 ENCOUNTER — TELEPHONE (OUTPATIENT)
Dept: PRIMARY CARE | Facility: CLINIC | Age: 63
End: 2023-05-24
Payer: COMMERCIAL

## 2023-06-14 ENCOUNTER — TELEPHONE (OUTPATIENT)
Dept: PRIMARY CARE | Facility: CLINIC | Age: 63
End: 2023-06-14
Payer: COMMERCIAL

## 2023-06-14 NOTE — TELEPHONE ENCOUNTER
Prior authorization was denied for Ozempic. Spoke to Patient to notify of denial. Patient is to follow-up with PCP, and continue all other medications as prescribed. Advised patient to call [facility] at [phone] if any additional assistance is needed.

## 2023-06-20 ENCOUNTER — OFFICE VISIT (OUTPATIENT)
Dept: PRIMARY CARE | Facility: CLINIC | Age: 63
End: 2023-06-20
Payer: COMMERCIAL

## 2023-06-20 VITALS
SYSTOLIC BLOOD PRESSURE: 112 MMHG | TEMPERATURE: 97.1 F | HEIGHT: 63 IN | BODY MASS INDEX: 28.88 KG/M2 | DIASTOLIC BLOOD PRESSURE: 58 MMHG | OXYGEN SATURATION: 97 % | WEIGHT: 163 LBS | HEART RATE: 90 BPM | RESPIRATION RATE: 16 BRPM

## 2023-06-20 DIAGNOSIS — F41.9 ANXIETY: ICD-10-CM

## 2023-06-20 DIAGNOSIS — Z86.010 HISTORY OF COLON POLYPS: Primary | ICD-10-CM

## 2023-06-20 DIAGNOSIS — Z12.31 ENCOUNTER FOR SCREENING MAMMOGRAM FOR MALIGNANT NEOPLASM OF BREAST: ICD-10-CM

## 2023-06-20 DIAGNOSIS — F32.A DEPRESSION, UNSPECIFIED DEPRESSION TYPE: ICD-10-CM

## 2023-06-20 DIAGNOSIS — E03.9 HYPOTHYROIDISM, UNSPECIFIED TYPE: ICD-10-CM

## 2023-06-20 DIAGNOSIS — E78.2 MIXED HYPERLIPIDEMIA: ICD-10-CM

## 2023-06-20 DIAGNOSIS — M48.07 STENOSIS OF LUMBOSACRAL SPINE: ICD-10-CM

## 2023-06-20 DIAGNOSIS — R73.9 HYPERGLYCEMIA: ICD-10-CM

## 2023-06-20 DIAGNOSIS — L71.9 ROSACEA: ICD-10-CM

## 2023-06-20 PROBLEM — Z85.828 HISTORY OF BASAL CELL CARCINOMA (BCC): Status: ACTIVE | Noted: 2021-07-20

## 2023-06-20 PROBLEM — G47.9 DIFFICULTY SLEEPING: Status: ACTIVE | Noted: 2021-07-20

## 2023-06-20 PROBLEM — Z85.89 HISTORY OF SQUAMOUS CELL CARCINOMA: Status: ACTIVE | Noted: 2021-07-20

## 2023-06-20 PROBLEM — B37.2 CANDIDAL INTERTRIGO: Status: ACTIVE | Noted: 2023-06-20

## 2023-06-20 PROBLEM — M96.1 POST LAMINECTOMY SYNDROME: Status: ACTIVE | Noted: 2019-12-17

## 2023-06-20 PROBLEM — E66.3 OVERWEIGHT WITH BODY MASS INDEX (BMI) 25.0-29.9: Status: ACTIVE | Noted: 2023-06-20

## 2023-06-20 PROBLEM — I25.10 CAD (CORONARY ARTERY DISEASE): Status: ACTIVE | Noted: 2023-06-20

## 2023-06-20 PROCEDURE — 99214 OFFICE O/P EST MOD 30 MIN: CPT | Performed by: FAMILY MEDICINE

## 2023-06-20 PROCEDURE — 1036F TOBACCO NON-USER: CPT | Performed by: FAMILY MEDICINE

## 2023-06-20 PROCEDURE — 3008F BODY MASS INDEX DOCD: CPT | Performed by: FAMILY MEDICINE

## 2023-06-20 RX ORDER — TIZANIDINE 2 MG/1
2 TABLET ORAL NIGHTLY PRN
Qty: 90 TABLET | Refills: 3 | Status: SHIPPED | OUTPATIENT
Start: 2023-06-20

## 2023-06-20 RX ORDER — SODIUM SULFACETAMIDE AND SULFUR 80; 40 MG/ML; MG/ML
SOLUTION TOPICAL
COMMUNITY
Start: 2023-01-18 | End: 2023-06-20 | Stop reason: SDUPTHER

## 2023-06-20 RX ORDER — SODIUM SULFACETAMIDE AND SULFUR 80; 40 MG/ML; MG/ML
SOLUTION TOPICAL
Qty: 473 ML | Refills: 3 | Status: SHIPPED | OUTPATIENT
Start: 2023-06-20 | End: 2023-10-27 | Stop reason: ALTCHOICE

## 2023-06-20 RX ORDER — TIZANIDINE 2 MG/1
1 TABLET ORAL NIGHTLY PRN
COMMUNITY
Start: 2021-10-19 | End: 2023-06-20 | Stop reason: SDUPTHER

## 2023-06-20 RX ORDER — METFORMIN HYDROCHLORIDE 500 MG/1
1 TABLET ORAL DAILY
COMMUNITY
Start: 2022-02-07 | End: 2023-08-14

## 2023-06-20 RX ORDER — OXYMETAZOLINE HYDROCHLORIDE 1 G/100G
CREAM TOPICAL
COMMUNITY
Start: 2023-01-18 | End: 2023-06-20 | Stop reason: SDUPTHER

## 2023-06-20 RX ORDER — NYSTATIN 100000 U/G
1 CREAM TOPICAL 2 TIMES DAILY
COMMUNITY
Start: 2022-09-20

## 2023-06-20 RX ORDER — DULAGLUTIDE 0.75 MG/.5ML
0.75 INJECTION, SOLUTION SUBCUTANEOUS
Qty: 6 ML | Refills: 3 | Status: SHIPPED | OUTPATIENT
Start: 2023-06-20 | End: 2023-10-27 | Stop reason: ALTCHOICE

## 2023-06-20 RX ORDER — OXYMETAZOLINE HYDROCHLORIDE 1 G/100G
CREAM TOPICAL
Qty: 30 G | Refills: 3 | Status: SHIPPED | OUTPATIENT
Start: 2023-06-20

## 2023-06-20 RX ORDER — VENLAFAXINE HYDROCHLORIDE 150 MG/1
1 CAPSULE, EXTENDED RELEASE ORAL DAILY
COMMUNITY
Start: 2019-10-07 | End: 2023-09-28 | Stop reason: SDUPTHER

## 2023-06-20 RX ORDER — DULAGLUTIDE 0.75 MG/.5ML
0.75 INJECTION, SOLUTION SUBCUTANEOUS
Qty: 2 ML | Refills: 11 | Status: SHIPPED | OUTPATIENT
Start: 2023-06-20 | End: 2023-06-20 | Stop reason: SDUPTHER

## 2023-06-20 RX ORDER — SEMAGLUTIDE 1.34 MG/ML
0.5 INJECTION, SOLUTION SUBCUTANEOUS
COMMUNITY
Start: 2022-05-31 | End: 2023-10-27 | Stop reason: ALTCHOICE

## 2023-06-20 NOTE — PROGRESS NOTES
"Subjective   Patient ID: Cielo Peters is a 63 y.o. female who presents for Prediabetes (Insurance will no longer cover ozempic).  Covid vax: x 3  CRC: 2017  Mammogram: due 8/2023  Pap: hysterectomy  Lmp: hysterectomy   reqs trulicity  Or ozempic(declined now)    HPI  Patient Active Problem List   Diagnosis    Anxiety    CAD (coronary artery disease)    Candidal intertrigo    Depression    Difficulty sleeping    History of basal cell carcinoma (BCC)    History of squamous cell carcinoma    Hyperglycemia    Hypothyroidism    Mixed hyperlipidemia    Post laminectomy syndrome    Rosacea    Stenosis of lumbosacral spine    TMJ (temporomandibular joint disorder)       Past Surgical History:   Procedure Laterality Date    BACK SURGERY  2017    BREAST BIOPSY  2013    CARDIAC CATHETERIZATION  2014    mostly (-) per pt    CARPAL TUNNEL RELEASE Right 1984    CHOLECYSTECTOMY  2013    COLONOSCOPY W/ POLYPECTOMY  08/2017    due 2022    HYSTERECTOMY  2009    tahbso no ca    LITHOTRIPSY Right 11/2020   Needs scope    Is aware off label use for wt loss  Review of Systems  This patient has   NO history of recent Covid nor flu symptoms,  NO Fever nor chills,  NO Chest pain, shortness of breath nor paroxysmal nocturnal dyspnea,  NO Nausea, vomiting, nor diarrhea,  NO Hematochezia nor melena,  NO Dysuria, hematuria, nor new incontinence issues  NO new severe headaches nor neurological complaints,  NO new issues with anxiety nor depression nor new psychiatric complaints,  NO suicidal nor homicidal ideations.     OBJECTIVE:  /58   Pulse 90   Temp 36.2 °C (97.1 °F) (Temporal)   Resp 16   Ht 1.588 m (5' 2.5\")   Wt 73.9 kg (163 lb)   LMP  (LMP Unknown)   SpO2 97%   BMI 29.34 kg/m²      General:  alert, oriented, no acute distress.  No obvious skin rashes noted.   No gait disturbance noted.    Mood is pleasant, not tearful, no signs of emotional distress.  Not appearing intoxicated or altered.   No voiced delusions, "   Normal, appropriate behavior.    HEENT: Normocephalic, atraumatic,   Pupils round, reactive to light  Extraocular motions intact and wnl  Tympanic membranes normal    Neck: no nuchal rigidity  No masses palpable.  No carotid bruits.  No thyromegaly.    Respiratory: Equal breath sounds  No wheezes,    rales,    nor rhonchi  No respiratory distress.    Heart: Regular rate and rhythm, no    murmurs  no rubs/gallops    Abdomen: no masses palpable, nontender, no rebound nor guarding.    Extremities: NO cyanosis noted, no clubbing.   No edema noted.  2+dorsalis pedis pulses.    Normal-not antalgic, steady gait.    No visits with results within 3 Month(s) from this visit.   Latest known visit with results is:   Legacy Encounter on 09/20/2022   Component Date Value Ref Range Status    Glucose 09/20/2022 90  74 - 99 mg/dL Final    Sodium 09/20/2022 139  136 - 145 mmol/L Final    Potassium 09/20/2022 3.8  3.5 - 5.3 mmol/L Final    Chloride 09/20/2022 103  98 - 107 mmol/L Final    Bicarbonate 09/20/2022 28  21 - 32 mmol/L Final    Anion Gap 09/20/2022 12  10 - 20 mmol/L Final    Urea Nitrogen 09/20/2022 17  6 - 23 mg/dL Final    Creatinine 09/20/2022 0.69  0.50 - 1.05 mg/dL Final    GFR Female 09/20/2022 >90  >90 mL/min/1.73m2 Final    Comment:  CALCULATIONS OF ESTIMATED GFR ARE PERFORMED   USING THE 2021 CKD-EPI STUDY REFIT EQUATION   WITHOUT THE RACE VARIABLE FOR THE IDMS-TRACEABLE   CREATININE METHODS.    https://jasn.asnjournals.org/content/early/2021/09/22/ASN.7798018251      Calcium 09/20/2022 10.1  8.6 - 10.3 mg/dL Final    Albumin 09/20/2022 4.2  3.4 - 5.0 g/dL Final    Alkaline Phosphatase 09/20/2022 73  33 - 136 U/L Final    Total Protein 09/20/2022 6.9  6.4 - 8.2 g/dL Final    AST 09/20/2022 16  9 - 39 U/L Final    Total Bilirubin 09/20/2022 0.2  0.0 - 1.2 mg/dL Final    ALT (SGPT) 09/20/2022 14  7 - 45 U/L Final    Comment:  Patients treated with Sulfasalazine may generate    falsely decreased results for ALT.       Hemoglobin A1C 09/20/2022 5.2  % Final    Comment:      Diagnosis of Diabetes-Adults   Non-Diabetic: < or = 5.6%   Increased risk for developing diabetes: 5.7-6.4%   Diagnostic of diabetes: > or = 6.5%  .       Monitoring of Diabetes                Age (y)     Therapeutic Goal (%)   Adults:          >18           <7.0   Pediatrics:    13-18           <7.5                   7-12           <8.0                   0- 6            7.5-8.5   American Diabetes Association. Diabetes Care 33(S1), Jan 2010.      Estimated Average Glucose 09/20/2022 103  MG/DL Final        Assessment/Plan     Problem List Items Addressed This Visit       Anxiety    Depression    Hyperglycemia    Relevant Orders    CBC and Auto Differential    Comprehensive Metabolic Panel    Hemoglobin A1C    Lipid Panel    Hypothyroidism    Relevant Orders    Thyroid Stimulating Hormone    Thyroxine, Free    Mixed hyperlipidemia    Relevant Orders    CBC and Auto Differential    Comprehensive Metabolic Panel    Lipid Panel    Rosacea    Relevant Medications    sulfacetamide sodium-sulfur 8-4 % suspension    oxymetazoline (Rhofade) 1 % cream    Stenosis of lumbosacral spine    Relevant Medications    tiZANidine (Zanaflex) 2 mg tablet     Other Visit Diagnoses       Encounter for screening mammogram for malignant neoplasm of breast        Relevant Orders    BI mammo bilateral screening tomosynthesis          Labs due soon  The patient is aware that results will be forthcoming of ALL planned labs and or tests. If no results are received on my chart or by letter within 1 - 3 weeks, the patient is aware they need to call to obtain results, as this is not usual. Also, if any new conditions arise, or current condition worsens, it is understood that sooner appointment should be made or urgent care/convenient care or emergency room treatment should be sought depending on severity. Otherwise follow up for recheck at regular intervals as we have discussed, at least  yearly.      Labs end yr here 11/2023  Reqs face lesion

## 2023-06-20 NOTE — LETTER
August 18, 2023     Deepti Peters  [address]  [facility] [address]      Dear Ms. Peters:    Below are the results from your recent visit:  THIS mammogram is NORMAL:)   Repeat at next regularly scheduled interval.   Dr ronald Baker   BI mammo bilateral screening tomosynthesis    Narrative    Interpreted By:  MADISON RAMIREZ MD  MRN: [id number]  Patient Name: DEEPTI PETERS     STUDY:  DIGITAL SCREENING BILATERAL MAMMOGRAM WITH BREAST TOMOSYNTHESIS AND  WITH CAD;  8/17/2023 9:00 am     INDICATION:  Routine screening.     COMPARISON:  07/28/2022     ACCESSION NUMBER(S):  [id number]     ORDERING CLINICIAN:  MARIA D FAY     FINDINGS:  2D and tomosynthesis images were reviewed at 1 mm slice thickness.  Images were obtained in MLO and CC projections.     The breast tissue is heterogeneously dense, which may obscure small  masses. No suspicious masses or calcifications are identified. There  are stable areas of asymmetry bilaterally.     This study was interpreted with CAD.       Impression    No mammographic evidence of malignancy.     BI-RADS CATEGORY:     Category: 1 - Negative.  Recommendation: 1 Year Screening.

## 2023-06-26 ENCOUNTER — TELEPHONE (OUTPATIENT)
Dept: PRIMARY CARE | Facility: CLINIC | Age: 63
End: 2023-06-26
Payer: COMMERCIAL

## 2023-06-26 NOTE — TELEPHONE ENCOUNTER
Prior authorization was denied for Trulicity. LVM to notify of denial. Patient is to follow-up with PCP, and continue all other medications as prescribed. Advised patient to call [facility] at [phone] if any additional assistance is needed.

## 2023-06-29 ENCOUNTER — OFFICE VISIT (OUTPATIENT)
Dept: PAIN MANAGEMENT | Age: 63
End: 2023-06-29
Payer: COMMERCIAL

## 2023-06-29 VITALS
BODY MASS INDEX: 29.44 KG/M2 | SYSTOLIC BLOOD PRESSURE: 122 MMHG | DIASTOLIC BLOOD PRESSURE: 76 MMHG | HEIGHT: 62 IN | TEMPERATURE: 97.6 F | WEIGHT: 160 LBS

## 2023-06-29 DIAGNOSIS — M46.1 SACROILIITIS (HCC): Primary | ICD-10-CM

## 2023-06-29 DIAGNOSIS — Z98.1 HISTORY OF LUMBAR FUSION: ICD-10-CM

## 2023-06-29 PROCEDURE — 99214 OFFICE O/P EST MOD 30 MIN: CPT | Performed by: NURSE PRACTITIONER

## 2023-06-29 ASSESSMENT — ENCOUNTER SYMPTOMS
RESPIRATORY NEGATIVE: 1
BACK PAIN: 1
DIARRHEA: 0
CONSTIPATION: 0

## 2023-06-30 ENCOUNTER — TELEPHONE (OUTPATIENT)
Dept: PAIN MANAGEMENT | Age: 63
End: 2023-06-30

## 2023-06-30 DIAGNOSIS — E03.8 OTHER SPECIFIED HYPOTHYROIDISM: ICD-10-CM

## 2023-06-30 RX ORDER — LEVOTHYROXINE SODIUM 88 UG/1
TABLET ORAL
Qty: 90 TABLET | Refills: 3 | Status: SHIPPED | OUTPATIENT
Start: 2023-06-30

## 2023-07-18 ENCOUNTER — PROCEDURE VISIT (OUTPATIENT)
Dept: PAIN MANAGEMENT | Age: 63
End: 2023-07-18

## 2023-07-18 DIAGNOSIS — M46.1 SACROILIITIS (HCC): ICD-10-CM

## 2023-07-18 RX ORDER — BETAMETHASONE SODIUM PHOSPHATE AND BETAMETHASONE ACETATE 3; 3 MG/ML; MG/ML
6 INJECTION, SUSPENSION INTRA-ARTICULAR; INTRALESIONAL; INTRAMUSCULAR; SOFT TISSUE ONCE
Status: COMPLETED | OUTPATIENT
Start: 2023-07-18 | End: 2023-07-18

## 2023-07-18 RX ORDER — LIDOCAINE HYDROCHLORIDE 10 MG/ML
10 INJECTION, SOLUTION EPIDURAL; INFILTRATION; INTRACAUDAL; PERINEURAL ONCE
Status: COMPLETED | OUTPATIENT
Start: 2023-07-18 | End: 2023-07-18

## 2023-07-18 RX ADMIN — LIDOCAINE HYDROCHLORIDE 10 MG: 10 INJECTION, SOLUTION EPIDURAL; INFILTRATION; INTRACAUDAL; PERINEURAL at 08:45

## 2023-07-18 RX ADMIN — BETAMETHASONE SODIUM PHOSPHATE AND BETAMETHASONE ACETATE 6 MG: 3; 3 INJECTION, SUSPENSION INTRA-ARTICULAR; INTRALESIONAL; INTRAMUSCULAR; SOFT TISSUE at 08:45

## 2023-07-18 NOTE — PROGRESS NOTES
[facility]) Physicians  Neurosurgery and Pain [facility]  [address] [address] [facility], [address] [place] [address] [place]: [phone]  F: [phone]      Patient Name: Lisa Hamilton  : 1960     Date:  2023      Physician: Elmer Gordon DO        Lisa Hamilton is here today for interventional pain management. Preoperatively, the patient presents with SI joint mediated pain, as per history and exam. Patient has failed NSAIDs, PT, and conservative treatment. Patient has significant psychological and functional impairment due to this condition. Standard ASIPP guidelines were followed and sterile technique used. Area was cleaned with Betadine x3. Informed consent was obtained. Fluoroscopic guidance was used for this procedure. Patient wanted only  right side done. S.I. JOINT:  right  Appropriate length spinal needle was advanced to the S.I. Joint. Negative aspiration was achieved. In total, approximately 6mg of Betamethasone and 2ml of 1% preservative free Lidocaine was injected without difficulty. Patient tolerated the procedure well, no obvious complications occurred during the procedure. Patient was appropriately monitored and discharged home in stable condition with their usual motor strength. Post Op Instructions were given to patient. Relevant and recent imaging reviewed with patient today.                                       Elmer Gordon DO

## 2023-08-14 DIAGNOSIS — R73.9 HYPERGLYCEMIA: Primary | ICD-10-CM

## 2023-08-14 RX ORDER — METFORMIN HYDROCHLORIDE 500 MG/1
500 TABLET ORAL DAILY
Qty: 90 TABLET | Refills: 3 | Status: SHIPPED | OUTPATIENT
Start: 2023-08-14 | End: 2023-10-27 | Stop reason: ALTCHOICE

## 2023-08-24 ENCOUNTER — OFFICE VISIT (OUTPATIENT)
Dept: PRIMARY CARE | Facility: CLINIC | Age: 63
End: 2023-08-24
Payer: COMMERCIAL

## 2023-08-24 VITALS
HEIGHT: 63 IN | HEART RATE: 87 BPM | BODY MASS INDEX: 29.41 KG/M2 | SYSTOLIC BLOOD PRESSURE: 128 MMHG | TEMPERATURE: 97.1 F | DIASTOLIC BLOOD PRESSURE: 84 MMHG | RESPIRATION RATE: 18 BRPM | WEIGHT: 166 LBS | OXYGEN SATURATION: 98 %

## 2023-08-24 DIAGNOSIS — R39.9 UTI SYMPTOMS: ICD-10-CM

## 2023-08-24 LAB
POC APPEARANCE, URINE: CLEAR
POC BILIRUBIN, URINE: NEGATIVE
POC BLOOD, URINE: NEGATIVE
POC COLOR, URINE: YELLOW
POC GLUCOSE, URINE: NEGATIVE MG/DL
POC KETONES, URINE: NEGATIVE MG/DL
POC LEUKOCYTES, URINE: NEGATIVE
POC NITRITE,URINE: NEGATIVE
POC PH, URINE: 6 PH
POC PROTEIN, URINE: NEGATIVE MG/DL
POC SPECIFIC GRAVITY, URINE: 1.01
POC UROBILINOGEN, URINE: 0.2 EU/DL

## 2023-08-24 PROCEDURE — 1036F TOBACCO NON-USER: CPT | Performed by: PHYSICIAN ASSISTANT

## 2023-08-24 PROCEDURE — 87086 URINE CULTURE/COLONY COUNT: CPT

## 2023-08-24 PROCEDURE — 81003 URINALYSIS AUTO W/O SCOPE: CPT | Performed by: PHYSICIAN ASSISTANT

## 2023-08-24 PROCEDURE — 3008F BODY MASS INDEX DOCD: CPT | Performed by: PHYSICIAN ASSISTANT

## 2023-08-24 PROCEDURE — 99214 OFFICE O/P EST MOD 30 MIN: CPT | Performed by: PHYSICIAN ASSISTANT

## 2023-08-24 RX ORDER — NITROFURANTOIN 25; 75 MG/1; MG/1
100 CAPSULE ORAL
Qty: 14 CAPSULE | Refills: 0 | Status: SHIPPED | OUTPATIENT
Start: 2023-08-24 | End: 2023-08-31

## 2023-08-24 NOTE — PROGRESS NOTES
"Subjective   Patient ID: Cielo Peters is a 63 y.o. female who presents for UTI (Dr Montes pt here today for UTI symptoms which include burning with urination, cloudy urine and pt took at home UTI test and all 3 positive for UTI.1 week ago this past Monday came down with hives then over weekend noticed the UTI. ).    HPI     UTI sxs:   - Onset:  - Associated sxs: dysuria, frequency, urgency, feeling of incomplete voiding  - Denies fever, abdominal or flank pain, N/V, gross hematuria  - History of recurrent UTIs:  - Recent UTI:  - Recent Antibiotics:    - Diabetic:     1 week ago had hives  Tehn osme burning with urination and cloudy urine   Took home UTI test x 3 and all indicated yes   Has been drinking lots of water     Review of Systems   Genitourinary:  Positive for dysuria.       Objective   /84   Pulse 87   Temp 36.2 °C (97.1 °F)   Resp 18   Ht 1.588 m (5' 2.5\")   Wt 75.3 kg (166 lb)   LMP  (LMP Unknown)   SpO2 98%   BMI 29.88 kg/m²     Physical Exam  Constitutional:       Appearance: Normal appearance.   Cardiovascular:      Rate and Rhythm: Normal rate and regular rhythm.      Pulses: Normal pulses.      Heart sounds: Normal heart sounds. No murmur heard.  Pulmonary:      Effort: Pulmonary effort is normal.      Breath sounds: Normal breath sounds.   Abdominal:      Tenderness: There is no abdominal tenderness.   Neurological:      Mental Status: She is alert.   Psychiatric:         Mood and Affect: Mood and affect normal.         Assessment/Plan   Problem List Items Addressed This Visit    None  Visit Diagnoses       UTI symptoms        Relevant Medications    nitrofurantoin, macrocrystal-monohydrate, (Macrobid) 100 mg capsule    Other Relevant Orders    POCT UA Automated manually resulted (Completed)    Urine Culture               "

## 2023-08-25 LAB — URINE CULTURE: NORMAL

## 2023-08-25 ASSESSMENT — ENCOUNTER SYMPTOMS: DYSURIA: 1

## 2023-09-08 ENCOUNTER — TELEPHONE (OUTPATIENT)
Dept: PRIMARY CARE | Facility: CLINIC | Age: 63
End: 2023-09-08
Payer: COMMERCIAL

## 2023-09-08 DIAGNOSIS — Z86.010 HISTORY OF COLON POLYPS: ICD-10-CM

## 2023-09-08 DIAGNOSIS — Z12.11 COLON CANCER SCREENING: ICD-10-CM

## 2023-09-08 NOTE — TELEPHONE ENCOUNTER
Simon pt calling in about referral, pt received a gastro referral and got a call from gastro office stating a consult is not needed and Dr Montes can just order the colonoscopy and they will schedule her. Please change referral to colonoscopy

## 2023-09-28 ENCOUNTER — LAB (OUTPATIENT)
Dept: LAB | Facility: LAB | Age: 63
End: 2023-09-28
Payer: COMMERCIAL

## 2023-09-28 DIAGNOSIS — E03.9 HYPOTHYROIDISM, UNSPECIFIED TYPE: ICD-10-CM

## 2023-09-28 DIAGNOSIS — F41.9 ANXIETY: ICD-10-CM

## 2023-09-28 DIAGNOSIS — F32.A DEPRESSION, UNSPECIFIED DEPRESSION TYPE: ICD-10-CM

## 2023-09-28 DIAGNOSIS — R73.9 HYPERGLYCEMIA: ICD-10-CM

## 2023-09-28 DIAGNOSIS — E78.2 MIXED HYPERLIPIDEMIA: ICD-10-CM

## 2023-09-28 LAB
ALANINE AMINOTRANSFERASE (SGPT) (U/L) IN SER/PLAS: 21 U/L (ref 7–45)
ALBUMIN (G/DL) IN SER/PLAS: 4 G/DL (ref 3.4–5)
ALKALINE PHOSPHATASE (U/L) IN SER/PLAS: 84 U/L (ref 33–136)
ANION GAP IN SER/PLAS: 11 MMOL/L (ref 10–20)
ASPARTATE AMINOTRANSFERASE (SGOT) (U/L) IN SER/PLAS: 19 U/L (ref 9–39)
BASOPHILS (10*3/UL) IN BLOOD BY AUTOMATED COUNT: 0.06 X10E9/L (ref 0–0.1)
BASOPHILS/100 LEUKOCYTES IN BLOOD BY AUTOMATED COUNT: 1.2 % (ref 0–2)
BILIRUBIN TOTAL (MG/DL) IN SER/PLAS: 0.5 MG/DL (ref 0–1.2)
CALCIUM (MG/DL) IN SER/PLAS: 9.7 MG/DL (ref 8.6–10.3)
CARBON DIOXIDE, TOTAL (MMOL/L) IN SER/PLAS: 29 MMOL/L (ref 21–32)
CHLORIDE (MMOL/L) IN SER/PLAS: 105 MMOL/L (ref 98–107)
CHOLESTEROL (MG/DL) IN SER/PLAS: 217 MG/DL (ref 0–199)
CHOLESTEROL IN HDL (MG/DL) IN SER/PLAS: 56.4 MG/DL
CHOLESTEROL/HDL RATIO: 3.8
CREATININE (MG/DL) IN SER/PLAS: 0.76 MG/DL (ref 0.5–1.05)
EOSINOPHILS (10*3/UL) IN BLOOD BY AUTOMATED COUNT: 0.16 X10E9/L (ref 0–0.7)
EOSINOPHILS/100 LEUKOCYTES IN BLOOD BY AUTOMATED COUNT: 3.1 % (ref 0–6)
ERYTHROCYTE DISTRIBUTION WIDTH (RATIO) BY AUTOMATED COUNT: 12.2 % (ref 11.5–14.5)
ERYTHROCYTE MEAN CORPUSCULAR HEMOGLOBIN CONCENTRATION (G/DL) BY AUTOMATED: 31.8 G/DL (ref 32–36)
ERYTHROCYTE MEAN CORPUSCULAR VOLUME (FL) BY AUTOMATED COUNT: 98 FL (ref 80–100)
ERYTHROCYTES (10*6/UL) IN BLOOD BY AUTOMATED COUNT: 4.42 X10E12/L (ref 4–5.2)
ESTIMATED AVERAGE GLUCOSE FOR HBA1C: 108 MG/DL
GFR FEMALE: 88 ML/MIN/1.73M2
GLUCOSE (MG/DL) IN SER/PLAS: 90 MG/DL (ref 74–99)
HEMATOCRIT (%) IN BLOOD BY AUTOMATED COUNT: 43.4 % (ref 36–46)
HEMOGLOBIN (G/DL) IN BLOOD: 13.8 G/DL (ref 12–16)
HEMOGLOBIN A1C/HEMOGLOBIN TOTAL IN BLOOD: 5.4 %
IMMATURE GRANULOCYTES/100 LEUKOCYTES IN BLOOD BY AUTOMATED COUNT: 0.2 % (ref 0–0.9)
LDL: 129 MG/DL (ref 0–99)
LEUKOCYTES (10*3/UL) IN BLOOD BY AUTOMATED COUNT: 5.1 X10E9/L (ref 4.4–11.3)
LYMPHOCYTES (10*3/UL) IN BLOOD BY AUTOMATED COUNT: 2.09 X10E9/L (ref 1.2–4.8)
LYMPHOCYTES/100 LEUKOCYTES IN BLOOD BY AUTOMATED COUNT: 40.7 % (ref 13–44)
MONOCYTES (10*3/UL) IN BLOOD BY AUTOMATED COUNT: 0.36 X10E9/L (ref 0.1–1)
MONOCYTES/100 LEUKOCYTES IN BLOOD BY AUTOMATED COUNT: 7 % (ref 2–10)
NEUTROPHILS (10*3/UL) IN BLOOD BY AUTOMATED COUNT: 2.46 X10E9/L (ref 1.2–7.7)
NEUTROPHILS/100 LEUKOCYTES IN BLOOD BY AUTOMATED COUNT: 47.8 % (ref 40–80)
PLATELETS (10*3/UL) IN BLOOD AUTOMATED COUNT: 282 X10E9/L (ref 150–450)
POTASSIUM (MMOL/L) IN SER/PLAS: 4.1 MMOL/L (ref 3.5–5.3)
PROTEIN TOTAL: 6.5 G/DL (ref 6.4–8.2)
SODIUM (MMOL/L) IN SER/PLAS: 141 MMOL/L (ref 136–145)
THYROTROPIN (MIU/L) IN SER/PLAS BY DETECTION LIMIT <= 0.05 MIU/L: 1.37 MIU/L (ref 0.44–3.98)
THYROXINE (T4) FREE (NG/DL) IN SER/PLAS: 0.81 NG/DL (ref 0.61–1.12)
TRIGLYCERIDE (MG/DL) IN SER/PLAS: 160 MG/DL (ref 0–149)
UREA NITROGEN (MG/DL) IN SER/PLAS: 19 MG/DL (ref 6–23)
VLDL: 32 MG/DL (ref 0–40)

## 2023-09-28 PROCEDURE — 84439 ASSAY OF FREE THYROXINE: CPT

## 2023-09-28 PROCEDURE — 85025 COMPLETE CBC W/AUTO DIFF WBC: CPT

## 2023-09-28 PROCEDURE — 80053 COMPREHEN METABOLIC PANEL: CPT

## 2023-09-28 PROCEDURE — 84443 ASSAY THYROID STIM HORMONE: CPT

## 2023-09-28 PROCEDURE — 83036 HEMOGLOBIN GLYCOSYLATED A1C: CPT

## 2023-09-28 PROCEDURE — 80061 LIPID PANEL: CPT

## 2023-09-28 PROCEDURE — 36415 COLL VENOUS BLD VENIPUNCTURE: CPT

## 2023-09-28 RX ORDER — VENLAFAXINE HYDROCHLORIDE 150 MG/1
150 CAPSULE, EXTENDED RELEASE ORAL DAILY
Qty: 90 CAPSULE | Refills: 3 | Status: SHIPPED | OUTPATIENT
Start: 2023-09-28

## 2023-10-02 DIAGNOSIS — E03.9 HYPOTHYROIDISM, UNSPECIFIED TYPE: ICD-10-CM

## 2023-10-02 DIAGNOSIS — R73.9 HYPERGLYCEMIA: ICD-10-CM

## 2023-10-02 DIAGNOSIS — E78.2 MIXED HYPERLIPIDEMIA: ICD-10-CM

## 2023-10-02 RX ORDER — ROSUVASTATIN CALCIUM 5 MG/1
5 TABLET, COATED ORAL DAILY
Qty: 30 TABLET | Refills: 11 | Status: SHIPPED | OUTPATIENT
Start: 2023-10-02 | End: 2024-01-29 | Stop reason: WASHOUT

## 2023-10-17 ENCOUNTER — ANESTHESIA EVENT (OUTPATIENT)
Dept: GASTROENTEROLOGY | Facility: EXTERNAL LOCATION | Age: 63
End: 2023-10-17

## 2023-10-26 PROBLEM — R31.9 HEMATURIA: Status: ACTIVE | Noted: 2021-07-20

## 2023-10-26 PROBLEM — H90.0 CONDUCTIVE HEARING LOSS, BILATERAL: Status: ACTIVE | Noted: 2021-07-20

## 2023-10-26 PROBLEM — H69.90 DYSFUNCTION OF EUSTACHIAN TUBE: Status: ACTIVE | Noted: 2021-07-20

## 2023-10-26 PROBLEM — N20.0 NEPHROLITHIASIS: Status: ACTIVE | Noted: 2023-10-26

## 2023-10-26 PROBLEM — L98.9 SKIN LESION OF FACE: Status: ACTIVE | Noted: 2023-10-26

## 2023-10-26 PROBLEM — M25.50 ARTHRALGIA: Status: ACTIVE | Noted: 2021-07-20

## 2023-10-26 PROBLEM — R92.8 ABNORMAL MAMMOGRAM: Status: ACTIVE | Noted: 2023-10-26

## 2023-10-26 RX ORDER — FLUCONAZOLE 100 MG/1
100 TABLET ORAL DAILY
COMMUNITY

## 2023-10-26 RX ORDER — SODIUM, POTASSIUM,MAG SULFATES 17.5-3.13G
SOLUTION, RECONSTITUTED, ORAL ORAL
COMMUNITY
Start: 2023-09-13 | End: 2023-10-27 | Stop reason: ALTCHOICE

## 2023-10-27 ENCOUNTER — HOSPITAL ENCOUNTER (OUTPATIENT)
Dept: GASTROENTEROLOGY | Facility: EXTERNAL LOCATION | Age: 63
Discharge: HOME | End: 2023-10-27
Payer: COMMERCIAL

## 2023-10-27 ENCOUNTER — ANESTHESIA (OUTPATIENT)
Dept: GASTROENTEROLOGY | Facility: EXTERNAL LOCATION | Age: 63
End: 2023-10-27

## 2023-10-27 VITALS
HEIGHT: 62 IN | RESPIRATION RATE: 13 BRPM | WEIGHT: 160 LBS | HEART RATE: 69 BPM | SYSTOLIC BLOOD PRESSURE: 109 MMHG | BODY MASS INDEX: 29.44 KG/M2 | OXYGEN SATURATION: 99 % | TEMPERATURE: 97.3 F | DIASTOLIC BLOOD PRESSURE: 71 MMHG

## 2023-10-27 DIAGNOSIS — Z12.11 COLON CANCER SCREENING: ICD-10-CM

## 2023-10-27 DIAGNOSIS — Z86.010 HISTORY OF COLON POLYPS: ICD-10-CM

## 2023-10-27 PROBLEM — R11.2 PONV (POSTOPERATIVE NAUSEA AND VOMITING): Status: ACTIVE | Noted: 2023-10-27

## 2023-10-27 PROBLEM — Z98.890 PONV (POSTOPERATIVE NAUSEA AND VOMITING): Status: ACTIVE | Noted: 2023-10-27

## 2023-10-27 PROCEDURE — 88305 TISSUE EXAM BY PATHOLOGIST: CPT | Mod: TC | Performed by: INTERNAL MEDICINE

## 2023-10-27 PROCEDURE — 88305 TISSUE EXAM BY PATHOLOGIST: CPT | Mod: TC,SUR | Performed by: INTERNAL MEDICINE

## 2023-10-27 PROCEDURE — 45380 COLONOSCOPY AND BIOPSY: CPT | Performed by: INTERNAL MEDICINE

## 2023-10-27 PROCEDURE — 88305 TISSUE EXAM BY PATHOLOGIST: CPT | Performed by: PATHOLOGY

## 2023-10-27 RX ORDER — PROPOFOL 10 MG/ML
INJECTION, EMULSION INTRAVENOUS AS NEEDED
Status: DISCONTINUED | OUTPATIENT
Start: 2023-10-27 | End: 2023-10-27

## 2023-10-27 RX ORDER — SODIUM CHLORIDE 9 MG/ML
20 INJECTION, SOLUTION INTRAVENOUS CONTINUOUS
Status: CANCELLED | OUTPATIENT
Start: 2023-10-27

## 2023-10-27 RX ORDER — ONDANSETRON HYDROCHLORIDE 2 MG/ML
4 INJECTION, SOLUTION INTRAVENOUS ONCE AS NEEDED
Status: DISCONTINUED | OUTPATIENT
Start: 2023-10-27 | End: 2023-10-28 | Stop reason: HOSPADM

## 2023-10-27 RX ADMIN — PROPOFOL 50 MG: 10 INJECTION, EMULSION INTRAVENOUS at 10:27

## 2023-10-27 RX ADMIN — PROPOFOL 100 MG: 10 INJECTION, EMULSION INTRAVENOUS at 10:22

## 2023-10-27 RX ADMIN — PROPOFOL 50 MG: 10 INJECTION, EMULSION INTRAVENOUS at 10:32

## 2023-10-27 RX ADMIN — PROPOFOL 50 MG: 10 INJECTION, EMULSION INTRAVENOUS at 10:35

## 2023-10-27 RX ADMIN — PROPOFOL 50 MG: 10 INJECTION, EMULSION INTRAVENOUS at 10:25

## 2023-10-27 SDOH — HEALTH STABILITY: MENTAL HEALTH: CURRENT SMOKER: 0

## 2023-10-27 ASSESSMENT — PAIN SCALES - GENERAL
PAINLEVEL_OUTOF10: 0 - NO PAIN
PAIN_LEVEL: 0

## 2023-10-27 ASSESSMENT — PAIN - FUNCTIONAL ASSESSMENT
PAIN_FUNCTIONAL_ASSESSMENT: 0-10

## 2023-10-27 ASSESSMENT — COLUMBIA-SUICIDE SEVERITY RATING SCALE - C-SSRS
1. IN THE PAST MONTH, HAVE YOU WISHED YOU WERE DEAD OR WISHED YOU COULD GO TO SLEEP AND NOT WAKE UP?: NO
6. HAVE YOU EVER DONE ANYTHING, STARTED TO DO ANYTHING, OR PREPARED TO DO ANYTHING TO END YOUR LIFE?: NO
2. HAVE YOU ACTUALLY HAD ANY THOUGHTS OF KILLING YOURSELF?: NO

## 2023-10-27 NOTE — H&P
Outpatient Hospital Procedure H&P    Patient Profile-Procedures  Initial Info  Patient Demographics  Name Cielo Peters  Date of Birth 1960  MRN [id number]  Address   [address] [facility] [id number]  [facility] [address]    Primary Phone Number [phone]  Secondary Phone Number    PCP Keara Montes    Procedure(s):  Colonoscopy  Primary contact name and number   Extended Emergency Contact Information  Primary Emergency Contact: Daniele Peters  Home Phone: [phone]  Relation: Spouse    General Health  Weight   Vitals:    10/27/23 1000   Weight: 72.6 kg (160 lb)     BMI Body mass index is 29.26 kg/m².    Allergies  Allergies   Allergen Reactions    Atorvastatin Other     Muscle & joint pain    Codeine Hallucinations       Past Medical History   Past Medical History:   Diagnosis Date    Abnormal mammogram 08/2022    left breast cat 4-had bx--2/2023-left cat 1--bilateral due 8/2023    Basal cell carcinoma     History of mammogram 07/2021    cat 1       Provider assessment  Diagnosis: Screening   Medication Reviewed - yes  Prior to Admission medications    Medication Sig Start Date End Date Taking? Authorizing Provider   levothyroxine (Synthroid, Levoxyl) 88 mcg tablet TAKE 1 TABLET DAILY 6/30/23  Yes Keara Montes MD   nystatin (Mycostatin) cream Apply 1 Film topically 2 times a day. 9/20/22  Yes Historical Provider, MD   oxymetazoline (Rhofade) 1 % cream APPLY TO THE AFFECTED AREA(S) DAILY 6/20/23  Yes Keara Montes MD   tiZANidine (Zanaflex) 2 mg tablet Take 1 tablet (2 mg) by mouth as needed at bedtime for muscle spasms. 6/20/23  Yes Keara Montes MD   venlafaxine XR (Effexor-XR) 150 mg 24 hr capsule TAKE 1 CAPSULE DAILY WITH FOOD 9/28/23   Keara Montes MD   sodium,potassium,mag sulfates (Suprep) 17.5-3.13-1.6 gram recon soln solution USE AS DIRECTED. 9/13/23 10/27/23 Yes Historical Provider, MD   sulfacetamide sodium-sulfur 8-4 % suspension apply  daily 6/20/23 10/27/23 Yes Keara Montes MD   fluconazole (Diflucan) 100 mg tablet Take 1 tablet (100 mg) by mouth once daily.    Historical Provider, MD   rosuvastatin (Crestor) 5 mg tablet Take 1 tablet (5 mg) by mouth once daily.  Patient not taking: Reported on 10/27/2023 10/2/23 10/1/24  Keara Montes MD   dulaglutide (Trulicity) 0.75 mg/0.5 mL pen injector Inject 0.75 mg under the skin 1 (one) time per week. 6/20/23 10/27/23  Keara Montes MD   metFORMIN (Glucophage) 500 mg tablet TAKE 1 TABLET DAILY 8/14/23 10/27/23  Keara Montes MD   Ozempic 0.25 mg or 0.5 mg(2 mg/1.5 mL) pen injector Inject 0.5 mg under the skin 1 (one) time per week. 5/31/22 10/27/23  Historical Provider, MD       Physical Exam  Vitals:    10/27/23 1000   BP: 125/69   Pulse: 86   Resp: 14   Temp: 36 °C (96.8 °F)   SpO2: 97%        General: A&Ox3, NAD.  HEENT: AT/NC.   CV: RRR. No murmur.  Resp: CTA bilaterally. No wheezing, rhonchi or rales.   GI: Soft, NT/ND. BSx4.  Extrem: No edema. Pulses intact.  Skin: No Jaundice.   Neuro: No focal deficits.   Psych: Normal mood and affect.        Oropharyngeal Classification II (hard and soft palate, upper portion of tonsils anduvula visible)  ASA PS Classification 2  Sedation Plan Deep  Procedure Plan - pre-procedural (re)assesment completed by physician:  discharge/transfer patient when discharge criteria met    Dexter Quevedo MD  10/27/2023 10:19 AM

## 2023-10-27 NOTE — ANESTHESIA POSTPROCEDURE EVALUATION
Patient: Cielo Peters    Procedure Summary       Date: 10/27/23 Room / Location: [facility]    Anesthesia Start: 1020 Anesthesia Stop: 1050    Procedure: COLONOSCOPY Diagnosis:       Colon cancer screening      History of colon polyps    Scheduled Providers: Dexter Quevedo MD; Sarah Segovia RN; BILLY Manley Responsible Provider: BILLY Manley    Anesthesia Type: MAC ASA Status: 2            Anesthesia Type: MAC    Vitals Value Taken Time   /66 10/27/23 1049   Temp 36.3 °C (97.3 °F) 10/27/23 1049   Pulse 75 10/27/23 1049   Resp 18 10/27/23 1049   SpO2 98 % 10/27/23 1049       Anesthesia Post Evaluation    Patient location during evaluation: PACU  Patient participation: complete - patient participated  Level of consciousness: awake and alert  Pain score: 0  Pain management: adequate  Airway patency: patent  Cardiovascular status: acceptable  Respiratory status: acceptable  Hydration status: acceptable        No notable events documented.

## 2023-10-27 NOTE — DISCHARGE INSTRUCTIONS
The anesthetics, sedatives and pain killers which were given to you will be acting in your body for the next 24 hours. This may cause you to feel sleepy. This feeling will slowly wear off. For the next 24 hours. Resume to normal activities tomorrow  FOR THE REST OF TODAY, DO NOT:  Drive a car  Operate machinery or power tools  Drink any form of alcohol, beer or wine.  Do not take any over the counter medications with alcohol in them or that can make you groggy  Make any important decisions or sign and legal documents.    **You may eat anything as long as your physician has not warned you to stay away from certain foods. However, it is better to start with liquids,  then progress to softer foods, and gradually work up to solid foods.    **We strongly suggest that a responsible adult be with you for the rest of the day and also the night. This is for your protection and safety since you may not be as alert as usual. You should be especially careful climbing stairs.     **If you experience bleeding in your spit or stool, fever and chills, shortness of breath, chest pain, or extreme abdominal pain, uncontrollable nausea and vomiting go to the nearest Emergency Room.    **Resume home medications unless otherwise instructed      [facility] Phone Number [phone]   [facility] [phone] If you need to speak with a doctor or it's after 5pm on a weekday or all weekend

## 2023-10-27 NOTE — ANESTHESIA PREPROCEDURE EVALUATION
Patient: Cielo Peters    Procedure Information       Date/Time: 10/27/23 1010    Scheduled providers: Dexter Quevedo MD; Sarah Segovia RN; FRANKLIN Manley-CRNA    Procedure: COLONOSCOPY    Location: [facility]            Relevant Problems   Anesthesia   (+) PONV (postoperative nausea and vomiting)      Cardiovascular   (+) CAD (coronary artery disease)   (+) Mixed hyperlipidemia      Endocrine   (+) Hypothyroidism      /Renal   (+) Nephrolithiasis      Neuro/Psych   (+) Anxiety   (+) Depression      Musculoskeletal   (+) Stenosis of lumbosacral spine      Eyes, Ears, Nose, and Throat   (+) Conductive hearing loss, bilateral      Infectious Disease   (+) Candidal intertrigo       Clinical information reviewed:   Tobacco  Allergies  Meds   Med Hx  Surg Hx  OB Status  Fam Hx  Soc   Hx        NPO Detail:  NPO/Void Status  Date of Last Liquid: 10/27/23  Time of Last Liquid: 0600  Date of Last Solid: 10/25/23         Physical Exam    Airway  Mallampati: II  TM distance: >3 FB     Cardiovascular - normal exam     Dental    Pulmonary - normal exam     Abdominal - normal exam             Anesthesia Plan    ASA 2     MAC     The patient is not a current smoker.  Patient was not previously instructed to abstain from smoking on day of procedure.  Patient did not smoke on day of procedure.    intravenous induction   Anesthetic plan and risks discussed with patient.  Use of blood products discussed with patient who consented to blood products.    Plan discussed with CRNA.

## 2023-11-17 LAB
LABORATORY COMMENT REPORT: NORMAL
PATH REPORT.FINAL DX SPEC: NORMAL
PATH REPORT.GROSS SPEC: NORMAL
PATH REPORT.TOTAL CANCER: NORMAL

## 2023-12-28 DIAGNOSIS — R73.9 HYPERGLYCEMIA: Primary | ICD-10-CM

## 2024-01-02 RX ORDER — METFORMIN HYDROCHLORIDE 500 MG/1
500 TABLET ORAL DAILY
Qty: 90 TABLET | Refills: 3 | Status: SHIPPED | OUTPATIENT
Start: 2024-01-02

## 2024-01-29 ENCOUNTER — OFFICE VISIT (OUTPATIENT)
Dept: PRIMARY CARE | Facility: CLINIC | Age: 64
End: 2024-01-29
Payer: COMMERCIAL

## 2024-01-29 VITALS
WEIGHT: 176 LBS | RESPIRATION RATE: 16 BRPM | DIASTOLIC BLOOD PRESSURE: 58 MMHG | HEART RATE: 70 BPM | SYSTOLIC BLOOD PRESSURE: 110 MMHG | OXYGEN SATURATION: 98 % | TEMPERATURE: 97.1 F | BODY MASS INDEX: 32.39 KG/M2 | HEIGHT: 62 IN

## 2024-01-29 DIAGNOSIS — E03.9 HYPOTHYROIDISM, UNSPECIFIED TYPE: ICD-10-CM

## 2024-01-29 DIAGNOSIS — L73.8 SEBACEOUS HYPERPLASIA: Primary | ICD-10-CM

## 2024-01-29 DIAGNOSIS — L71.9 ROSACEA: ICD-10-CM

## 2024-01-29 DIAGNOSIS — F32.A DEPRESSION, UNSPECIFIED DEPRESSION TYPE: ICD-10-CM

## 2024-01-29 DIAGNOSIS — E78.2 MIXED HYPERLIPIDEMIA: ICD-10-CM

## 2024-01-29 DIAGNOSIS — R73.9 HYPERGLYCEMIA: ICD-10-CM

## 2024-01-29 DIAGNOSIS — F41.9 ANXIETY: ICD-10-CM

## 2024-01-29 PROCEDURE — 1036F TOBACCO NON-USER: CPT | Performed by: FAMILY MEDICINE

## 2024-01-29 PROCEDURE — 99214 OFFICE O/P EST MOD 30 MIN: CPT | Performed by: FAMILY MEDICINE

## 2024-01-29 PROCEDURE — 3008F BODY MASS INDEX DOCD: CPT | Performed by: FAMILY MEDICINE

## 2024-01-29 RX ORDER — DOXYCYCLINE 100 MG/1
100 CAPSULE ORAL 2 TIMES DAILY
Qty: 180 CAPSULE | Refills: 1 | Status: SHIPPED | OUTPATIENT
Start: 2024-01-29 | End: 2024-02-28

## 2024-01-29 RX ORDER — TRETINOIN 0.5 MG/G
CREAM TOPICAL NIGHTLY
Qty: 30 G | Refills: 2 | Status: SHIPPED | OUTPATIENT
Start: 2024-01-29 | End: 2025-01-28

## 2024-01-29 NOTE — PROGRESS NOTES
"Subjective   Patient ID: Cielo Peters is a 63 y.o. female who presents for Skin Problem (Bumps on face popping up) and Weight Gain (Was on ozempic but insurance company is no longer covering it).  Covid vax: UTD  Flu: UTD per pt  RSV: advised  Shingles: advised     CRC: due 2028  Mammogram: 8/2023  Pap: hysterectomy  Lmp: hysterectomy     HPI  Patient Active Problem List   Diagnosis    Anxiety    CAD (coronary artery disease)    Candidal intertrigo    Depression    Difficulty sleeping    History of basal cell carcinoma (BCC)    History of squamous cell carcinoma    Hyperglycemia    Hypothyroidism    Mixed hyperlipidemia    Post laminectomy syndrome    Rosacea    Stenosis of lumbosacral spine    TMJ (temporomandibular joint disorder)    Overweight with body mass index (BMI) 25.0-29.9    Arthralgia    Conductive hearing loss, bilateral    Dysfunction of eustachian tube    Hematuria    Abnormal mammogram    Nephrolithiasis    Skin lesion of face    PONV (postoperative nausea and vomiting)       Past Surgical History:   Procedure Laterality Date    BACK SURGERY  2017    BREAST BIOPSY  2013    CARDIAC CATHETERIZATION  2014    mostly (-) per pt    CARPAL TUNNEL RELEASE Right 1984    CHOLECYSTECTOMY  2013    COLONOSCOPY W/ POLYPECTOMY  10/2023    wnl-due 2028    HYSTERECTOMY  2009    tahbso no ca    LITHOTRIPSY Right 11/2020       Review of Systems +CAD  This patient has   NO history of recent Covid nor flu symptoms,  NO Fever nor chills,  NO Chest pain, shortness of breath nor paroxysmal nocturnal dyspnea,  NO Nausea, vomiting, nor diarrhea,  NO Hematochezia nor melena,  NO Dysuria, hematuria, nor new incontinence issues  NO new severe headaches nor neurological complaints,  NO new issues with anxiety nor depression nor new psychiatric complaints,  NO suicidal nor homicidal ideations.     OBJECTIVE:  /58   Pulse 70   Temp 36.2 °C (97.1 °F) (Temporal)   Resp 16   Ht 1.575 m (5' 2\")   Wt 79.8 kg (176 lb)  "  LMP  (LMP Unknown)   SpO2 98%   BMI 32.19 kg/m²      General:  alert, oriented, no acute distress.  No obvious skin rashes noted.   No gait disturbance noted.    Mood is pleasant, not tearful, no signs of emotional distress.  Not appearing intoxicated or altered.   No voiced delusions,   Normal, appropriate behavior.    HEENT: Normocephalic, atraumatic,   Pupils round, reactive to light  Extraocular motions intact and wnl  Tympanic membranes normal    Neck: no nuchal rigidity  No masses palpable.  No carotid bruits.  No thyromegaly.    Respiratory: Equal breath sounds  No wheezes,    rales,    nor rhonchi  No respiratory distress.    Heart: Regular rate and rhythm, no    murmurs  no rubs/gallops    Abdomen: no masses palpable, nontender, no rebound nor guarding.  overwt  Extremities: NO cyanosis noted, no clubbing.   No edema noted.  2+dorsalis pedis pulses.    Normal-not antalgic, steady gait.  Some areas of papules ? Hyperplasia on forehead and irreg areas on cheeks-c/w likely rosacea    No visits with results within 3 Month(s) from this visit.   Latest known visit with results is:   Hospital Outpatient Visit on 10/27/2023   Component Date Value Ref Range Status    Case Report 10/27/2023    Final                    Value:Surgical Pathology                                Case: [id number]                                  Authorizing Provider:  Dexter Quevedo MD          Collected:           10/27/2023 1039              Ordering Location:     [facility] Received:            10/30/2023 1127              Pathologist:           Yvan Colunga MD                                                           Specimen:    COLON - DESCENDING POLYP, descending colon polyp cold bx x1                                FINAL DIAGNOSIS 10/27/2023    Final                    Value:This result contains rich text formatting which cannot be displayed here.      10/27/2023    Final                    Value:This result  contains rich text formatting which cannot be displayed here.    Gross Description 10/27/2023    Final                    Value:This result contains rich text formatting which cannot be displayed here.        Assessment/Plan     Problem List Items Addressed This Visit       Anxiety    Depression    Hyperglycemia    Hypothyroidism    Mixed hyperlipidemia    Rosacea     Add doxy  The risks, benefits, and alternatives for the antibiotic prescribed were discussed with patient as well as possible side effects. If ANY signs or symptoms of allergic reaction patient is to discontinue medicine immediately and call us or GO TO ER if tongue swelling/respiratory issues or significant effects.  It has already been determined that the benefits outweigh the risks of an antibiotic for you at this time, unless otherwise indicated.  Antibiotics are usually meant to be taken to completion as advised and it is usual course for symptoms to IMPROVE while taking-if symptoms worsen or new problems arise, we should be notified or medical evaluation should occur. Persistent side effects such as diarrhea especially after antibiotic discontinuation are unusual, and should prompt assessment and evaluation either in office or ER depending on severity. Rash, and/or other symptoms of concern should also cause evaluation.  Complete resolution of original symptoms are expected and we should be informed if this does not occur.  Dr toni ferguson may be needed-sent  This medications risks, benefits, and alternatives were discussed with patient at length.  If any unwanted side effects occur-discontinue medicine and call the office for discussion.  Limit sun exposure on these meds-wear sunscreen

## 2024-01-29 NOTE — PROGRESS NOTES
Covid vax: UTD  Flu: UTD per pt  RSV: advised  Shingles: advised    CRC: due 2028  Mammogram: 8/2023  Pap: hysterectomy  Lmp: hysterectomy

## 2024-04-16 DIAGNOSIS — N20.0 NEPHROLITHIASIS: ICD-10-CM

## 2024-04-30 ENCOUNTER — APPOINTMENT (OUTPATIENT)
Dept: RADIOLOGY | Facility: HOSPITAL | Age: 64
End: 2024-04-30
Payer: COMMERCIAL

## 2024-05-01 ENCOUNTER — APPOINTMENT (OUTPATIENT)
Dept: UROLOGY | Facility: CLINIC | Age: 64
End: 2024-05-01
Payer: COMMERCIAL

## 2024-05-10 ENCOUNTER — OFFICE VISIT (OUTPATIENT)
Dept: DERMATOLOGY | Facility: CLINIC | Age: 64
End: 2024-05-10
Payer: COMMERCIAL

## 2024-05-10 DIAGNOSIS — L82.1 SEBORRHEIC KERATOSIS: ICD-10-CM

## 2024-05-10 DIAGNOSIS — D22.30 MELANOCYTIC NEVI OF FACE: ICD-10-CM

## 2024-05-10 DIAGNOSIS — L71.9 ROSACEA: ICD-10-CM

## 2024-05-10 DIAGNOSIS — L73.8 SEBACEOUS HYPERPLASIA OF FACE: Primary | ICD-10-CM

## 2024-05-10 PROCEDURE — 99203 OFFICE O/P NEW LOW 30 MIN: CPT | Performed by: DERMATOLOGY

## 2024-05-10 PROCEDURE — 1036F TOBACCO NON-USER: CPT | Performed by: DERMATOLOGY

## 2024-05-10 PROCEDURE — 3008F BODY MASS INDEX DOCD: CPT | Performed by: DERMATOLOGY

## 2024-05-10 ASSESSMENT — PATIENT GLOBAL ASSESSMENT (PGA): PATIENT GLOBAL ASSESSMENT: PATIENT GLOBAL ASSESSMENT:  2 - MILD

## 2024-05-10 ASSESSMENT — ITCH NUMERIC RATING SCALE: HOW SEVERE IS YOUR ITCHING?: 0

## 2024-05-10 ASSESSMENT — DERMATOLOGY PATIENT ASSESSMENT
HAVE YOU HAD OR DO YOU HAVE VASCULAR DISEASE: NO
DO YOU USE A TANNING BED: NO
HAVE YOU HAD OR DO YOU HAVE A STAPH INFECTION: NO
ARE YOU AN ORGAN TRANSPLANT RECIPIENT: NO
DO YOU USE SUNSCREEN: OCCASIONALLY
DO YOU HAVE ANY NEW OR CHANGING LESIONS: YES

## 2024-05-10 ASSESSMENT — DERMATOLOGY QUALITY OF LIFE (QOL) ASSESSMENT
RATE HOW BOTHERED YOU ARE BY EFFECTS OF YOUR SKIN PROBLEMS ON YOUR ACTIVITIES (EG, GOING OUT, ACCOMPLISHING WHAT YOU WANT, WORK ACTIVITIES OR YOUR RELATIONSHIPS WITH OTHERS): 0 - NEVER BOTHERED
RATE HOW BOTHERED YOU ARE BY SYMPTOMS OF YOUR SKIN PROBLEM (EG, ITCHING, STINGING BURNING, HURTING OR SKIN IRRITATION): 0 - NEVER BOTHERED
DATE THE QUALITY-OF-LIFE ASSESSMENT WAS COMPLETED: 66970
WHAT SINGLE SKIN CONDITION LISTED BELOW IS THE PATIENT ANSWERING THE QUALITY-OF-LIFE ASSESSMENT QUESTIONS ABOUT: ROSACEA
RATE HOW EMOTIONALLY BOTHERED YOU ARE BY YOUR SKIN PROBLEM (FOR EXAMPLE, WORRY, EMBARRASSMENT, FRUSTRATION): 0 - NEVER BOTHERED
ARE THERE EXCLUSIONS OR EXCEPTIONS FOR THE QUALITY OF LIFE ASSESSMENT: NO

## 2024-05-31 ENCOUNTER — OFFICE VISIT (OUTPATIENT)
Dept: DERMATOLOGY | Facility: CLINIC | Age: 64
End: 2024-05-31

## 2024-05-31 DIAGNOSIS — Z41.9 SURGERY, ELECTIVE: ICD-10-CM

## 2024-05-31 DIAGNOSIS — L73.8 SEBACEOUS HYPERPLASIA OF FACE: Primary | ICD-10-CM

## 2024-05-31 DIAGNOSIS — L72.0 MILIA: ICD-10-CM

## 2024-05-31 PROCEDURE — 17110 DESTRUCTION B9 LES UP TO 14: CPT | Performed by: DERMATOLOGY

## 2024-05-31 PROCEDURE — 1036F TOBACCO NON-USER: CPT | Performed by: DERMATOLOGY

## 2024-05-31 PROCEDURE — 3008F BODY MASS INDEX DOCD: CPT | Performed by: DERMATOLOGY

## 2024-05-31 ASSESSMENT — DERMATOLOGY QUALITY OF LIFE (QOL) ASSESSMENT
ARE THERE EXCLUSIONS OR EXCEPTIONS FOR THE QUALITY OF LIFE ASSESSMENT: NO
RATE HOW EMOTIONALLY BOTHERED YOU ARE BY YOUR SKIN PROBLEM (FOR EXAMPLE, WORRY, EMBARRASSMENT, FRUSTRATION): 6 - ALWAYS BOTHERED
RATE HOW BOTHERED YOU ARE BY EFFECTS OF YOUR SKIN PROBLEMS ON YOUR ACTIVITIES (EG, GOING OUT, ACCOMPLISHING WHAT YOU WANT, WORK ACTIVITIES OR YOUR RELATIONSHIPS WITH OTHERS): 0 - NEVER BOTHERED
WHAT SINGLE SKIN CONDITION LISTED BELOW IS THE PATIENT ANSWERING THE QUALITY-OF-LIFE ASSESSMENT QUESTIONS ABOUT: NONE OF THE ABOVE
RATE HOW BOTHERED YOU ARE BY SYMPTOMS OF YOUR SKIN PROBLEM (EG, ITCHING, STINGING BURNING, HURTING OR SKIN IRRITATION): 0 - NEVER BOTHERED

## 2024-05-31 ASSESSMENT — DERMATOLOGY PATIENT ASSESSMENT
DO YOU USE A TANNING BED: NO
ARE YOU TRYING TO GET PREGNANT: NO
DO YOU USE SUNSCREEN: DAILY
DO YOU HAVE ANY NEW OR CHANGING LESIONS: NO
HAVE YOU HAD OR DO YOU HAVE A STAPH INFECTION: NO
FOR PATIENTS COMING IN FOR A FOLLOW-UP VISIT - HAVE THERE BEEN ANY CHANGES IN YOUR HEALTH SINCE YOUR LAST VISIT: NO
DO YOU HAVE IRREGULAR MENSTRUAL CYCLES: NO
HAVE YOU HAD OR DO YOU HAVE VASCULAR DISEASE: NO
ARE YOU ON BIRTH CONTROL: NO
ARE YOU AN ORGAN TRANSPLANT RECIPIENT: NO

## 2024-05-31 ASSESSMENT — ITCH NUMERIC RATING SCALE: HOW SEVERE IS YOUR ITCHING?: 0

## 2024-05-31 ASSESSMENT — PATIENT GLOBAL ASSESSMENT (PGA): PATIENT GLOBAL ASSESSMENT: PATIENT GLOBAL ASSESSMENT:  1 - CLEAR

## 2024-05-31 NOTE — PROGRESS NOTES
Subjective     Cielo Peters is a 64 y.o. female who presents for the following: Cosmetic (Pt here for cosmetic treat of Sebaceous Hyperplasia, located on face. Pt has apply topical 10/10/10% benzocaine/lidocaine/tetracaine cream to face for 1 hour prior to appt. ).     Review of Systems:  No other skin or systemic complaints other than what is documented elsewhere in the note.    The following portions of the chart were reviewed this encounter and updated as appropriate:          Skin Cancer History  No skin cancer on file.      Specialty Problems          Dermatology Problems    History of basal cell carcinoma (BCC)    Rosacea    Candidal intertrigo    Skin lesion of face        Objective   Well appearing patient in no apparent distress; mood and affect are within normal limits.    A focused skin examination was performed. All findings within normal limits unless otherwise noted below.    Assessment/Plan   1. Sebaceous hyperplasia of face  Head - Anterior (Face)  Multiple Small yellow, lobulated papules with a central dell on the bilateral forehead, nose, cheeks,    Benign nature of these skin lesions were reviewed with the patient. Lesions can be treated, however this is a cosmetic procedure and not covered by insurance.    Patient elects to proceed today with destruction as a cosmetic procedure. 10 lesions on the face and cheeks treated today with electrodessication. The risks and benefits were reviewed including incomplete removal, crusting, blister hypo and/or hyperpigmentation, scarring and recurrence.      Destr of lesion - Head - Anterior (Face)  Complexity: simple    Destruction method comment:  Electrodessication  Informed consent: discussed and consent obtained    Timeout:  patient name, date of birth, surgical site, and procedure verified  Procedure prep:  Patient prepped in sterile fashion  Hemostasis achieved with:  electrodesiccation  Outcome: patient tolerated procedure well with no  complications    Post-procedure details: wound care instructions given      2. Milia  Right Upper Eyelid Margin  Small 1-2 mm white papules.    The benign nature of these skin lesions were reviewed, no treatment is necessary.   Please follow up for any new or pre-existing lesion that is changing in size, shape, color, becomes painful, tender, itches or bleed.      Destr of lesion - Right Upper Eyelid Margin  Complexity: simple    Destruction method comment:  18 gauge needle and cutips  Informed consent: discussed and consent obtained    Procedure prep:  Patient prepped in sterile fashion  Outcome: patient tolerated procedure well with no complications    Post-procedure details: wound care instructions given          Return to clinic as needed.    I was present for the entirety of the procedure(s). Procedure was performed by myself.     Chelsea Jones, DO

## 2024-06-24 DIAGNOSIS — E03.8 OTHER SPECIFIED HYPOTHYROIDISM: ICD-10-CM

## 2024-06-24 RX ORDER — LEVOTHYROXINE SODIUM 88 UG/1
TABLET ORAL
Qty: 90 TABLET | Refills: 0 | Status: SHIPPED | OUTPATIENT
Start: 2024-06-24

## 2024-07-07 DIAGNOSIS — M48.07 STENOSIS OF LUMBOSACRAL SPINE: ICD-10-CM

## 2024-07-08 RX ORDER — TIZANIDINE 2 MG/1
2 TABLET ORAL NIGHTLY PRN
Qty: 90 TABLET | Refills: 3 | Status: SHIPPED | OUTPATIENT
Start: 2024-07-08

## 2024-08-01 ENCOUNTER — APPOINTMENT (OUTPATIENT)
Dept: CARDIOLOGY | Facility: HOSPITAL | Age: 64
End: 2024-08-01
Payer: COMMERCIAL

## 2024-08-01 ENCOUNTER — APPOINTMENT (OUTPATIENT)
Dept: RADIOLOGY | Facility: HOSPITAL | Age: 64
End: 2024-08-01
Payer: COMMERCIAL

## 2024-08-01 ENCOUNTER — HOSPITAL ENCOUNTER (OUTPATIENT)
Facility: HOSPITAL | Age: 64
Setting detail: OBSERVATION
Discharge: HOME | End: 2024-08-03
Attending: STUDENT IN AN ORGANIZED HEALTH CARE EDUCATION/TRAINING PROGRAM | Admitting: STUDENT IN AN ORGANIZED HEALTH CARE EDUCATION/TRAINING PROGRAM
Payer: COMMERCIAL

## 2024-08-01 DIAGNOSIS — K92.2 UPPER GI BLEED: ICD-10-CM

## 2024-08-01 DIAGNOSIS — D64.9 ANEMIA, UNSPECIFIED TYPE: ICD-10-CM

## 2024-08-01 DIAGNOSIS — R55 SYNCOPE AND COLLAPSE: Primary | ICD-10-CM

## 2024-08-01 DIAGNOSIS — K92.1 MELENA: ICD-10-CM

## 2024-08-01 LAB
ABO GROUP (TYPE) IN BLOOD: NORMAL
ALBUMIN SERPL BCP-MCNC: 4 G/DL (ref 3.4–5)
ALP SERPL-CCNC: 76 U/L (ref 33–136)
ALT SERPL W P-5'-P-CCNC: 13 U/L (ref 7–45)
ANION GAP SERPL CALC-SCNC: 11 MMOL/L (ref 10–20)
ANTIBODY SCREEN: NORMAL
APTT PPP: 26 SECONDS (ref 27–38)
AST SERPL W P-5'-P-CCNC: 14 U/L (ref 9–39)
ATRIAL RATE: 80 BPM
BASOPHILS # BLD AUTO: 0.03 X10*3/UL (ref 0–0.1)
BASOPHILS NFR BLD AUTO: 0.3 %
BILIRUB SERPL-MCNC: 0.2 MG/DL (ref 0–1.2)
BNP SERPL-MCNC: 5 PG/ML (ref 0–99)
BUN SERPL-MCNC: 47 MG/DL (ref 6–23)
CALCIUM SERPL-MCNC: 9.3 MG/DL (ref 8.6–10.3)
CARDIAC TROPONIN I PNL SERPL HS: 4 NG/L (ref 0–13)
CARDIAC TROPONIN I PNL SERPL HS: <3 NG/L (ref 0–13)
CHLORIDE SERPL-SCNC: 103 MMOL/L (ref 98–107)
CO2 SERPL-SCNC: 25 MMOL/L (ref 21–32)
CREAT SERPL-MCNC: 0.54 MG/DL (ref 0.5–1.05)
EGFRCR SERPLBLD CKD-EPI 2021: >90 ML/MIN/1.73M*2
EOSINOPHIL # BLD AUTO: 0 X10*3/UL (ref 0–0.7)
EOSINOPHIL NFR BLD AUTO: 0 %
ERYTHROCYTE [DISTWIDTH] IN BLOOD BY AUTOMATED COUNT: 12.1 % (ref 11.5–14.5)
GLUCOSE SERPL-MCNC: 139 MG/DL (ref 74–99)
HCT VFR BLD AUTO: 32.7 % (ref 36–46)
HGB BLD-MCNC: 10.8 G/DL (ref 12–16)
IMM GRANULOCYTES # BLD AUTO: 0.05 X10*3/UL (ref 0–0.7)
IMM GRANULOCYTES NFR BLD AUTO: 0.5 % (ref 0–0.9)
INR PPP: 1 (ref 0.9–1.1)
LACTATE SERPL-SCNC: 1.2 MMOL/L (ref 0.4–2)
LIPASE SERPL-CCNC: 26 U/L (ref 9–82)
LYMPHOCYTES # BLD AUTO: 1.5 X10*3/UL (ref 1.2–4.8)
LYMPHOCYTES NFR BLD AUTO: 15.4 %
MAGNESIUM SERPL-MCNC: 1.84 MG/DL (ref 1.6–2.4)
MCH RBC QN AUTO: 31.7 PG (ref 26–34)
MCHC RBC AUTO-ENTMCNC: 33 G/DL (ref 32–36)
MCV RBC AUTO: 96 FL (ref 80–100)
MONOCYTES # BLD AUTO: 0.25 X10*3/UL (ref 0.1–1)
MONOCYTES NFR BLD AUTO: 2.6 %
NEUTROPHILS # BLD AUTO: 7.89 X10*3/UL (ref 1.2–7.7)
NEUTROPHILS NFR BLD AUTO: 81.2 %
NRBC BLD-RTO: 0 /100 WBCS (ref 0–0)
P AXIS: 54 DEGREES
P OFFSET: 206 MS
P ONSET: 152 MS
PLATELET # BLD AUTO: 260 X10*3/UL (ref 150–450)
POTASSIUM SERPL-SCNC: 3.8 MMOL/L (ref 3.5–5.3)
PR INTERVAL: 134 MS
PROT SERPL-MCNC: 6.8 G/DL (ref 6.4–8.2)
PROTHROMBIN TIME: 10.9 SECONDS (ref 9.8–12.8)
Q ONSET: 219 MS
QRS COUNT: 13 BEATS
QRS DURATION: 82 MS
QT INTERVAL: 388 MS
QTC CALCULATION(BAZETT): 447 MS
QTC FREDERICIA: 427 MS
R AXIS: 27 DEGREES
RBC # BLD AUTO: 3.41 X10*6/UL (ref 4–5.2)
RH FACTOR (ANTIGEN D): NORMAL
SARS-COV-2 RNA RESP QL NAA+PROBE: NOT DETECTED
SODIUM SERPL-SCNC: 135 MMOL/L (ref 136–145)
T AXIS: 46 DEGREES
T OFFSET: 413 MS
VENTRICULAR RATE: 80 BPM
WBC # BLD AUTO: 9.7 X10*3/UL (ref 4.4–11.3)

## 2024-08-01 PROCEDURE — 74174 CTA ABD&PLVS W/CONTRAST: CPT | Performed by: STUDENT IN AN ORGANIZED HEALTH CARE EDUCATION/TRAINING PROGRAM

## 2024-08-01 PROCEDURE — 96361 HYDRATE IV INFUSION ADD-ON: CPT

## 2024-08-01 PROCEDURE — 71275 CT ANGIOGRAPHY CHEST: CPT

## 2024-08-01 PROCEDURE — 2550000001 HC RX 255 CONTRASTS: Performed by: STUDENT IN AN ORGANIZED HEALTH CARE EDUCATION/TRAINING PROGRAM

## 2024-08-01 PROCEDURE — 96365 THER/PROPH/DIAG IV INF INIT: CPT | Mod: 59

## 2024-08-01 PROCEDURE — G0378 HOSPITAL OBSERVATION PER HR: HCPCS

## 2024-08-01 PROCEDURE — 83880 ASSAY OF NATRIURETIC PEPTIDE: CPT

## 2024-08-01 PROCEDURE — 83605 ASSAY OF LACTIC ACID: CPT

## 2024-08-01 PROCEDURE — 83690 ASSAY OF LIPASE: CPT

## 2024-08-01 PROCEDURE — 36415 COLL VENOUS BLD VENIPUNCTURE: CPT

## 2024-08-01 PROCEDURE — 87635 SARS-COV-2 COVID-19 AMP PRB: CPT

## 2024-08-01 PROCEDURE — 80053 COMPREHEN METABOLIC PANEL: CPT

## 2024-08-01 PROCEDURE — 85025 COMPLETE CBC W/AUTO DIFF WBC: CPT

## 2024-08-01 PROCEDURE — 86900 BLOOD TYPING SEROLOGIC ABO: CPT

## 2024-08-01 PROCEDURE — 83735 ASSAY OF MAGNESIUM: CPT

## 2024-08-01 PROCEDURE — 93005 ELECTROCARDIOGRAM TRACING: CPT

## 2024-08-01 PROCEDURE — 86901 BLOOD TYPING SEROLOGIC RH(D): CPT

## 2024-08-01 PROCEDURE — 72125 CT NECK SPINE W/O DYE: CPT | Performed by: RADIOLOGY

## 2024-08-01 PROCEDURE — 99285 EMERGENCY DEPT VISIT HI MDM: CPT | Mod: 25

## 2024-08-01 PROCEDURE — 96366 THER/PROPH/DIAG IV INF ADDON: CPT

## 2024-08-01 PROCEDURE — 72125 CT NECK SPINE W/O DYE: CPT

## 2024-08-01 PROCEDURE — 70450 CT HEAD/BRAIN W/O DYE: CPT

## 2024-08-01 PROCEDURE — 71275 CT ANGIOGRAPHY CHEST: CPT | Performed by: STUDENT IN AN ORGANIZED HEALTH CARE EDUCATION/TRAINING PROGRAM

## 2024-08-01 PROCEDURE — 84484 ASSAY OF TROPONIN QUANT: CPT

## 2024-08-01 PROCEDURE — 96375 TX/PRO/DX INJ NEW DRUG ADDON: CPT | Mod: 59

## 2024-08-01 PROCEDURE — 85610 PROTHROMBIN TIME: CPT

## 2024-08-01 PROCEDURE — 99223 1ST HOSP IP/OBS HIGH 75: CPT | Performed by: STUDENT IN AN ORGANIZED HEALTH CARE EDUCATION/TRAINING PROGRAM

## 2024-08-01 PROCEDURE — 70450 CT HEAD/BRAIN W/O DYE: CPT | Performed by: RADIOLOGY

## 2024-08-01 PROCEDURE — 2500000004 HC RX 250 GENERAL PHARMACY W/ HCPCS (ALT 636 FOR OP/ED)

## 2024-08-01 PROCEDURE — C9113 INJ PANTOPRAZOLE SODIUM, VIA: HCPCS

## 2024-08-01 RX ORDER — PANTOPRAZOLE SODIUM 40 MG/10ML
80 INJECTION, POWDER, LYOPHILIZED, FOR SOLUTION INTRAVENOUS ONCE
Status: COMPLETED | OUTPATIENT
Start: 2024-08-01 | End: 2024-08-01

## 2024-08-01 RX ORDER — ONDANSETRON HYDROCHLORIDE 2 MG/ML
4 INJECTION, SOLUTION INTRAVENOUS ONCE
Status: DISCONTINUED | OUTPATIENT
Start: 2024-08-01 | End: 2024-08-01

## 2024-08-01 RX ORDER — METOCLOPRAMIDE HYDROCHLORIDE 5 MG/ML
10 INJECTION INTRAMUSCULAR; INTRAVENOUS ONCE
Status: COMPLETED | OUTPATIENT
Start: 2024-08-01 | End: 2024-08-01

## 2024-08-01 RX ORDER — DIPHENHYDRAMINE HYDROCHLORIDE 50 MG/ML
25 INJECTION INTRAMUSCULAR; INTRAVENOUS ONCE
Status: COMPLETED | OUTPATIENT
Start: 2024-08-01 | End: 2024-08-01

## 2024-08-01 RX ORDER — MAGNESIUM SULFATE 1 G/100ML
1 INJECTION INTRAVENOUS ONCE
Status: COMPLETED | OUTPATIENT
Start: 2024-08-01 | End: 2024-08-01

## 2024-08-01 RX ADMIN — METOCLOPRAMIDE HYDROCHLORIDE 10 MG: 5 INJECTION INTRAMUSCULAR; INTRAVENOUS at 15:04

## 2024-08-01 RX ADMIN — PANTOPRAZOLE SODIUM 80 MG: 40 INJECTION, POWDER, FOR SOLUTION INTRAVENOUS at 15:05

## 2024-08-01 RX ADMIN — IOHEXOL 100 ML: 350 INJECTION, SOLUTION INTRAVENOUS at 15:40

## 2024-08-01 RX ADMIN — DIPHENHYDRAMINE HYDROCHLORIDE 25 MG: 50 INJECTION, SOLUTION INTRAMUSCULAR; INTRAVENOUS at 15:05

## 2024-08-01 RX ADMIN — MAGNESIUM SULFATE HEPTAHYDRATE 1 G: 1 INJECTION, SOLUTION INTRAVENOUS at 15:04

## 2024-08-01 RX ADMIN — DEXAMETHASONE SODIUM PHOSPHATE 4 MG: 4 INJECTION, SOLUTION INTRAMUSCULAR; INTRAVENOUS at 15:05

## 2024-08-01 RX ADMIN — SODIUM CHLORIDE 500 ML: 9 INJECTION, SOLUTION INTRAVENOUS at 18:55

## 2024-08-01 ASSESSMENT — COLUMBIA-SUICIDE SEVERITY RATING SCALE - C-SSRS
6. HAVE YOU EVER DONE ANYTHING, STARTED TO DO ANYTHING, OR PREPARED TO DO ANYTHING TO END YOUR LIFE?: NO
1. IN THE PAST MONTH, HAVE YOU WISHED YOU WERE DEAD OR WISHED YOU COULD GO TO SLEEP AND NOT WAKE UP?: NO
2. HAVE YOU ACTUALLY HAD ANY THOUGHTS OF KILLING YOURSELF?: NO

## 2024-08-01 ASSESSMENT — PAIN - FUNCTIONAL ASSESSMENT: PAIN_FUNCTIONAL_ASSESSMENT: 0-10

## 2024-08-01 ASSESSMENT — LIFESTYLE VARIABLES
HAVE PEOPLE ANNOYED YOU BY CRITICIZING YOUR DRINKING: NO
HAVE YOU EVER FELT YOU SHOULD CUT DOWN ON YOUR DRINKING: NO
EVER FELT BAD OR GUILTY ABOUT YOUR DRINKING: NO
EVER HAD A DRINK FIRST THING IN THE MORNING TO STEADY YOUR NERVES TO GET RID OF A HANGOVER: NO
TOTAL SCORE: 0

## 2024-08-01 ASSESSMENT — PAIN SCALES - GENERAL
PAINLEVEL_OUTOF10: 0 - NO PAIN

## 2024-08-01 NOTE — H&P
Medical Group History and Physical  ASSESSMENT & PLAN:     Acute blood loss anemia, rule out upper GI bleed  - Presenting from home with 5-6 black tarry stools and 2 syncopal episodes in the last 24 hours  - Fecal occult grossly positive per ED  - Risk factors include excessive daily ibuprofen use with around 600 mg daily for many years due to chronic back pain as prescribed to her by her pain physician  - Hemoglobin 10.9 today (>14 in October 2023)  PLAN:  - Clear liquid diet today, n.p.o. at midnight  - Protonix 40 mg IV twice daily  - Transfuse for hemoglobin <7.0 or any dynamic instability  - GI consulted  Colonoscopy October 2023 reviewed with one 3 mm polyp resected    Syncope  - 2 episodes of syncope over the last 24 hours due to blood loss as per above  - Orthostats in the morning    Left breast calcification  - As seen on CTA, mammogram from 2023 reviewed  - Will need repeat mammogram with PCP this year as an outpatient    Hypothyroidism  Hyperlipidemia  Generalized anxiety  Depression  - Resume home medications once reconciled    VTE prophylaxis: SCDs only (no chemoprophylaxis for concerns for ongoing GIB).      ---Of note, this documentation is completed using the Dragon Dictation system (voice recognition software). There may be spelling and/or grammatical errors that were not corrected prior to final submission.---    José Manuel Santillan MD    HISTORY OF PRESENT ILLNESS:   Chief Complaint: Syncope, black stools    History Of Present Illness:    Cielo Peters is a 64 y.o. female with a significant past medical history of hypothyroidism, depression, anxiety, hyperlipidemia presented from home after 2 syncopal episodes.  Patient states that sometime overnight, patient was in the bathroom and had a syncopal episode and was down on the ground for unknown amount time.  This morning prior to going for her shower, patient had a another syncopal episode with her  as a witness.  Patient states that  over the last 24 hours she has had 5-6 liquidy black stools.  No petr blood in her stools or vomitus however.    Patient states that she has been consuming ibuprofen 200 mg x 3 tablets once a day for chronic back pain as prescribed to her by her pain management specialist.  She has been doing this for many years as well.  Denies tobacco and alcohol use however.    ED course:  - Vitals: Temperature 97, HR 88, /59, RR 18 saturating 97% on room air  - Labs: Hemoglobin 10.8 otherwise unremarkable CBC and CMP  - Imaging: CT C-spine and head without IV contrast with no acute findings.  CTA chest/abdomen/pelvis with IV contrast with no acute PE or any other evidence of acute arterial GI bleed.  Calcifications seen in the left breast with questing follow-up with any recent mammograms.     Review of systems: 10 point review of systems is otherwise negative except as mentioned above.    PAST HISTORIES:     Past Medical History:  She has a past medical history of Abnormal mammogram (08/2022), Basal cell carcinoma, and History of mammogram (08/17/2023).    Past Surgical History:  She has a past surgical history that includes Cardiac catheterization (2014); Carpal tunnel release (Right, 1984); Cholecystectomy (2013); Colonoscopy w/ polypectomy (10/2023); Hysterectomy (2009); Back surgery (2017); Lithotripsy (Right, 11/2020); and Breast biopsy (2013).      Social History:  She reports that she has never smoked. She has never used smokeless tobacco. She reports that she does not drink alcohol and does not use drugs.    Family History:  Family History   Problem Relation Name Age of Onset    No Known Problems Mother      Cancer Maternal Grandmother      Skin cancer Maternal Grandfather      Kidney failure Paternal Grandmother          Allergies:  Atorvastatin and Codeine    OBJECTIVE:     Last Recorded Vitals:  Vitals:    08/01/24 1705 08/01/24 1735 08/01/24 1805 08/01/24 1835   BP: (!) 102/46 (!) 96/45 (!) 101/46 (!) 107/43    Pulse: 82 82 82 82   Resp:       Temp:       TempSrc:       SpO2: 100% 97% 99% 99%   Weight:       Height:         Last I/O:  I/O last 3 completed shifts:  In: 1100 (15.2 mL/kg) [I.V.:1100 (15.2 mL/kg)]  Out: - (0 mL/kg)   Weight: 72.6 kg     Physical Exam  Vitals reviewed.   Constitutional:       General: She is not in acute distress.     Appearance: Normal appearance. She is not toxic-appearing.   HENT:      Head: Normocephalic and atraumatic.      Nose: Nose normal.      Mouth/Throat:      Mouth: Mucous membranes are moist.      Pharynx: Oropharynx is clear.      Comments: Dentition okay.  Eyes:      Extraocular Movements: Extraocular movements intact.      Conjunctiva/sclera: Conjunctivae normal.      Pupils: Pupils are equal, round, and reactive to light.   Cardiovascular:      Rate and Rhythm: Normal rate and regular rhythm.      Pulses: Normal pulses.      Heart sounds: Normal heart sounds.   Pulmonary:      Effort: Pulmonary effort is normal. No respiratory distress.      Breath sounds: Normal breath sounds. No wheezing.   Abdominal:      General: Bowel sounds are normal. There is no distension.      Palpations: Abdomen is soft.      Tenderness: There is no abdominal tenderness. There is no guarding.   Musculoskeletal:         General: Normal range of motion.      Cervical back: Normal range of motion and neck supple.   Neurological:      General: No focal deficit present.      Mental Status: She is alert and oriented to person, place, and time. Mental status is at baseline.   Psychiatric:         Mood and Affect: Mood normal.         Behavior: Behavior normal.         Thought Content: Thought content normal.         Scheduled Medications  sodium chloride, 500 mL, intravenous, Once      PRN Medications    Continuous Medications       Outpatient Medications:  Prior to Admission medications    Medication Sig Start Date End Date Taking? Authorizing Provider   venlafaxine XR (Effexor-XR) 150 mg 24 hr capsule  TAKE 1 CAPSULE DAILY WITH FOOD 9/28/23   Keara Montes MD   fluconazole (Diflucan) 100 mg tablet Take 1 tablet (100 mg) by mouth once daily.    Historical Provider, MD   levothyroxine (Synthroid, Levoxyl) 88 mcg tablet TAKE 1 TABLET DAILY 6/24/24   Keara Montes MD   metFORMIN (Glucophage) 500 mg tablet TAKE 1 TABLET DAILY 1/2/24   Keara Montes MD   nystatin (Mycostatin) cream Apply 1 Film topically 2 times a day. 9/20/22   Historical Provider, MD   oxymetazoline (Rhofade) 1 % cream APPLY TO THE AFFECTED AREA(S) DAILY 6/20/23   Keara Montes MD   tiZANidine (Zanaflex) 2 mg tablet TAKE 1 TABLET AT BEDTIME AS NEEDED FOR MUSCLE SPASMS 7/8/24   Keara Montes MD   tretinoin (Retin-A) 0.05 % cream Apply topically once daily at bedtime. apply to face 1/29/24 1/28/25  Keara Montes MD       LABS AND IMAGING:     Labs:  Results from last 7 days   Lab Units 08/01/24  1424   WBC AUTO x10*3/uL 9.7   RBC AUTO x10*6/uL 3.41*   HEMOGLOBIN g/dL 10.8*   HEMATOCRIT % 32.7*   MCV fL 96   MCH pg 31.7   MCHC g/dL 33.0   RDW % 12.1   PLATELETS AUTO x10*3/uL 260     Results from last 7 days   Lab Units 08/01/24  1424   SODIUM mmol/L 135*   POTASSIUM mmol/L 3.8   CHLORIDE mmol/L 103   CO2 mmol/L 25   BUN mg/dL 47*   CREATININE mg/dL 0.54   GLUCOSE mg/dL 139*   PROTEIN TOTAL g/dL 6.8   CALCIUM mg/dL 9.3   BILIRUBIN TOTAL mg/dL 0.2   ALK PHOS U/L 76   AST U/L 14   ALT U/L 13     Results from last 7 days   Lab Units 08/01/24  1424   MAGNESIUM mg/dL 1.84     Results from last 7 days   Lab Units 08/01/24  1424   TROPHS ng/L <3       Imaging:  CT angio chest abdomen pelvis  Narrative: Interpreted By:  Dino Ford,   STUDY:  CT ANGIO CHEST ABDOMEN PELVIS;  8/1/2024 3:42 pm      INDICATION:  Signs/Symptoms:black stool.      COMPARISON:  CT 04/17/2023      ACCESSION NUMBER(S):  [id number]      ORDERING CLINICIAN:  URMILA HERNANDEZ      TECHNIQUE:  Axial non-contrast images of the chest, abdomen,  and pelvis with  coronal and sagittal reformatted images. Axial CT images of the  chest, abdomen and pelvis after the intravenous administration of  using angiographic technique with coronal and sagittal reformatted  images. MIP images were provided and reviewed. 3D reconstructions  were created on a separate independent workstation and reviewed.      FINDINGS:  VASCULATURE:      PULMONARY ARTERIES: No acute pulmonary embolism allowing for  suboptimal contrast bolus timing and extensive motion artifact.      THORACIC AORTA: Non-contrast images show no evidence of acute  intramural hematoma. No thoracic aortic aneurysm or dissection. No  significant thoracic aortic atherosclerosis.      ABDOMINAL AORTA: No abdominal aortic aneurysm or dissection. Minimal  abdominal aortic atherosclerosis.      ABDOMINAL AND PELVIC ARTERIES: No hemodynamically significant  stenosis or occlusion.          CT CHEST:      MEDIASTINUM AND LYMPH NODES:  No enlarged intrathoracic or axillary  lymph nodes. No pneumomediastinum.      HEART: Normal size. No coronary artery calcifications. No significant  pericardial effusion.      LUNG, PLEURA, LARGE AIRWAYS:  No consolidation, pulmonary edema,  pleural effusion, or pneumothorax.      OSSEOUS STRUCTURES/CHEST WALL:  No acute osseous abnormality.  Punctate calcification in the left breast.      CT ABDOMEN/PELVIS:      LIVER: No significant parenchymal abnormality.      BILE DUCTS: No significant intrahepatic or extrahepatic dilatation.      GALLBLADDER: Surgically absent      PANCREAS: No significant abnormality.      SPLEEN: Unchanged periphery calcified 2.2 cm cystic lesion within the  medial aspect of the spleen.      ADRENALS: No significant abnormality.      KIDNEYS, URETERS, BLADDER: No significant abnormality. Stable right  renal cyst. Urinary bladder is unremarkable.      REPRODUCTIVE ORGANS: Uterus is surgically absent.      VESSELS: (See above). No additional significant  abnormality.      RETROPERITONEUM/LYMPH NODES: No enlarged lymph nodes.      BOWEL/MESENTERY/PERITONEUM: No intraluminal contrast enhancement on  arterial or delayed phase imaging. Stomach is decompressed which  limits the evaluation. No inflammatory bowel wall thickening or  dilatation. Normal appendix.No significant ascites, free air, or  fluid collection.          ABDOMINAL WALL: No significant abnormality.      OSSEOUS STRUCTURES: Multilevel degenerative changes throughout the  thoracic and lumbar spine. Redemonstration of posterior fusion  hardware of L4-L5 with an interbody cage.      Impression: 1. No evidence of active arterial gastrointestinal bleed. If there is  persistent concern correlation with dedicated RBC scan can be  considered.  2. Punctate calcification in the left breast, correlate with any  recent mammogram.      Other findings as above.      MACRO:  None.      Signed by: Dino Ford 8/1/2024 6:23 PM  Dictation workstation:   [id number]  ECG 12 lead  See ED provider note for full interpretation and clinical correlation Normal sinus rhythm  Low voltage QRS  Nonspecific T wave abnormality  Abnormal ECG  No previous ECGs available  Confirmed by Linda Valderrama ([id number]) on 8/1/2024 5:30:45 PM  CT cervical spine wo IV contrast  Narrative: Interpreted By:  Jigar Pollard,   STUDY:  CT CERVICAL SPINE WO IV CONTRAST;  8/1/2024 3:40 pm      INDICATION:  Signs/Symptoms:syncope.      COMPARISON:  None.      ACCESSION NUMBER(S):  [id number]      ORDERING CLINICIAN:  URMILA HERNANDEZ      TECHNIQUE:  Axial CT images of the cervical spine are obtained. Axial, coronal  and sagittal reconstructions are provided for review.      FINDINGS:          Straightening of normal cervical lordosis. Disc space loss from C4  through C6 with endplate sclerosis osteophytes. No erosions. No acute  fracture-dislocation. Mild right-sided neural foraminal narrowing at  C4/C5. Moderate right-sided neural foraminal narrowing at  C5/C6.      Impression: No evidence for an acute fracture or subluxation of the cervical  spine.      Multilevel discogenic degenerative changes.      MACRO:  None      Signed by: Jigar Pollard 8/1/2024 4:03 PM  Dictation workstation:   [id number]  CT head wo IV contrast  Narrative: Interpreted By:  Jigar Pollard,   STUDY:  CT HEAD WO IV CONTRAST;  8/1/2024 3:40 pm      INDICATION:  Signs/Symptoms:syncope.      COMPARISON:  None.      ACCESSION NUMBER(S):  [id number]      ORDERING CLINICIAN:  URMILA HERNANDEZ      TECHNIQUE:  Noncontrast axial CT scan of head was performed. Angled reformats in  brain and bone windows were generated. The images were reviewed in  bone, brain, blood and soft tissue windows.      FINDINGS:  CSF Spaces: The ventricles, sulci and basal cisterns are within  normal limits. There is no extraaxial fluid collection.      Parenchyma:  The grey-white differentiation is intact. There is no  mass effect or midline shift.  There is no intracranial hemorrhage.      Calvarium: The calvarium is unremarkable.      Paranasal sinuses and mastoids: Visualized paranasal sinuses and  mastoids are clear.      Impression: No evidence of acute cortical infarct or intracranial hemorrhage.      No evidence of intracranial hemorrhage or displaced skull fracture.      MACRO:  None      Signed by: Jigar Pollard 8/1/2024 3:54 PM  Dictation workstation:   [id number]

## 2024-08-01 NOTE — ED PROVIDER NOTES
"HPI   Chief Complaint   Patient presents with    Syncope     c/o dizzinness with 2 syncopal episodes . Pt also has dark tarry stools since 10 pm last night.        History provided by: Patient    Limitations to history: None    CC: Multiple medical complaints    HPI: 64-year-old female presents emergency department to be evaluated for multiple medical complaints.  Patient states that her symptoms started yesterday with generalized illness.  Patient also states that she has had nausea and 1 episode of nonbloody vomiting.  She states that she started having diarrhea as well that she would characterize as \"black \".  She denies history of GI bleeding or use of anticoagulants or antiplatelets.  Denies history of esophageal varices or cirrhosis or alcoholism.  She has had a colonoscopy most recently approximately a year ago.  She is never required a blood transfusion.  She did take Pepto-Bismol however was having the dark stool before she took the medicine.  Denies use of iron supplements.  She denies having abdominal pain associated with this.  Feels very dehydrated as she has had multiple episodes of diarrhea.  Denies recent traveling, recent changes in water sources, or recent antibiotic use or history of C. difficile.  She states that she has had 2 episodes of syncope since her symptoms started.  She states \"the first time was when I was sitting on the toilet and went to stand up too quickly and ended up passing on the second time was when standing up too quickly in the shower \".  Patient has had lightheadedness and vasovagal syncope in the past.  She states that her symptoms feel more predominantly lightheadedness or the sensation that she is going to pass out however she has not intermittent dizziness or room spinning sensation.  The dizziness has been present for approximately same on a time and it is primarily seen if she moves too quickly or turns her head.  She does have a history of peripheral vertigo but has " not had an episode in several years.  Denies neck pain or vision changes.  She is able to walk as long as she closes her eyes and keeps her head straight.  She does report a generalized headache over the last couple days as well that is consistent with her chronic history of migraines however what makes a difference today is that it has been more persistent.  Has been taking Excedrin that has not been helping much.  Reports sensitivity to light and sound.  Denies a headache having sudden or abrupt onset or the worst headache she is ever had.  Denies being thunderclap in nature.  She denies history of ACS, she is not sure when her last stress test was but she never required stent placement or bypass.  Denies history of CHF or DVT or PEs.  Denies recent plane flights, recent surgeries, history of cancer, use of estrogen.  Denies pain or swelling in extremities and denies use of diuretics.  Denies diarrhea and constipation.  Denies urgency frequency dysuria.  Denies vaginal bleeding or discharge or hematuria.    ROS: Negative unless mentioned in HPI    Social Hx: Denies tobacco, alcohol, drug use    Medical Hx: Denies allergies.  Immunizations up-to-date.    Physical exam:    Constitutional: Patient is well-nourished and well-developed.  Sitting comfortably in the room and in no distress.  Oriented to person, place, time, and situation.    HEENT: Head is normocephalic, atraumatic. Patient's airway is patent.  Tympanic membranes are clear bilaterally.  Nasal mucosa clear.  Mouth with normal mucosa.  Throat is not erythematous and there are no oropharyngeal exudates, uvula is midline.  No obvious facial deformities.    Eyes: Clear bilaterally.  Pupils are equal round and reactive to light and accommodation.  Extraocular movements intact.      Cardiac: Regular rate, regular rhythm.  Heart sounds S1, S2.  No murmurs, rubs, or gallops.  PMI nondisplaced.  No JVD.    Respiratory: 97% on room air regular respiratory rate and  effort.  Breath sounds are clear and equal bilaterally, no adventitious lung sounds.  Patient is speaking in full sentences and is in no apparent respiratory distress. No use of accessory muscles.      Gastrointestinal: Abdomen is soft, nondistended, and nontender.  There are no obvious deformities.  No rebound tenderness or guarding.  Bowel sounds are normal active.    Genitourinary: No CVA or flank tenderness.    Musculoskeletal: No reproducible tenderness.  No obvious skin or bony deformities.  Patient has equal range of motion in all extremities and no strength deficiencies.  No muscle or joint tenderness. No back or neck tenderness.  Capillary refill less than 3 seconds.  Strong peripheral pulses.  No sensory deficits.    Neurological: Patient is alert and oriented.  No focal deficits.  5/5 strength in all extremities.  Cranial nerves II through XII intact. GCS15.  No slurred speech or facial drooping.  Upper and lower extremity coordination intact.  No neurological deficits.  NIH is 0.  Patient is able to walk without any abnormalities in her gait.    Skin: Skin is normal color for race and is warm, dry, and intact.  No evidence of trauma.  No lesions, rashes, bruising, jaundice, or masses.    Psych: Appropriate mood and affect.  No apparent risk to self or others.    Heme/lymph: No adenopathy, lymphadenopathy, or splenomegaly    Physical exam is otherwise negative unless stated above or in history of present illness.              Patient History   Past Medical History:   Diagnosis Date    Abnormal mammogram 08/2022    left breast cat 4-had bx--2/2023-left cat 1--bilateral due 8/2023    Basal cell carcinoma     History of mammogram 08/17/2023    cat 1     Past Surgical History:   Procedure Laterality Date    BACK SURGERY  2017    BREAST BIOPSY  2013    CARDIAC CATHETERIZATION  2014    mostly (-) per pt    CARPAL TUNNEL RELEASE Right 1984    CHOLECYSTECTOMY  2013    COLONOSCOPY W/ POLYPECTOMY  10/2023     wnl-due 2028    HYSTERECTOMY  2009    tahbso no ca    LITHOTRIPSY Right 11/2020     Family History   Problem Relation Name Age of Onset    No Known Problems Mother      Cancer Maternal Grandmother      Skin cancer Maternal Grandfather      Kidney failure Paternal Grandmother       Social History     Tobacco Use    Smoking status: Never    Smokeless tobacco: Never   Vaping Use    Vaping status: Never Used   Substance Use Topics    Alcohol use: Never    Drug use: Never       Physical Exam   ED Triage Vitals [08/01/24 1400]   Temperature Heart Rate Respirations BP   36.1 °C (97 °F) 88 18 123/59      Pulse Ox Temp Source Heart Rate Source Patient Position   97 % Temporal -- --      BP Location FiO2 (%)     -- --       Physical Exam      ED Course & MDM   Diagnoses as of 08/01/24 1841   Syncope and collapse   Upper GI bleed   Anemia, unspecified type          Patient updated on plan for lab testing, IV insertion, radiology imaging, and medications to be administered while in the ER (if indicated). Patient updated on expected wait times for testing and results. Patient provided my name and told to ask any staff member for questions or concerns if they should arise. Electronic medical record reviewed.     MDM    Upon initial assessment, patient was healthy non-toxic appearing and in no apparent distress.     Patient presented to the emergency department with the chief complaint lightheadedness and dizziness with syncopal episode, nausea, generalized illness, and black stool.  Breath sounds are clear and equal bilaterally, 97% on room air.  No muffled heart sounds, JVD, murmur. Patient is alert and oriented.  No focal deficits.  5/5 strength in all extremities.  Cranial nerves II through XII intact. GCS15.  No slurred speech or facial drooping.  Upper and lower extremity coordination intact.  No neurological deficits.  NIH is 0.  Patient is able to walk without any abnormalities in her gait.  Low suspicion for acute  anterior posterior stroke.  Low suspicion for central vertigo given that the patient's dizziness is worse with movement and also given her unremarkable neurological exam.  Low suspicion for subarachnoid hemorrhage or meningitis given her history and physical exam on arrival to the emergency department, vital signs were within normal limits    Will obtain basic blood work, EKG and troponin, coagulation screen type and screen, magnesium, CT of the head and neck, chest x-ray, CTA chest abdomen pelvis, urinalysis.  Will give the patient a migraine cocktail without the Toradol given that the patient is complaining of dark stool.  She will be given Benadryl, Reglan, Decadron, magnesium.      I staffed this patient my attending, agreeable plan of care.  Rectal exam was performed with a nurse chaperone.    There were no obvious external abnormalities however stool was black and Hemoccult positive.  She was given 80 mg of Protonix.    Patient's EKG is performed at 1445 inter by me.  Normal sinus rhythm 80 beats minute.  No ST ovation or depression.  No prolonged QT.  Patient has T wave version in lead V2, no previous to compare to.    COVID PCR is negative.  Original troponin is less than 3, will get a repeat.  Magnesium is 1.4.  Type and screen and coagulation screen obtained.  BNP is 5 her lipase is 26 but lactate is 1 2.  CMP reveals hyponatremia 135.  CBC shows a normocytic anemia with a hemoglobin of 10.8.  This appears to be new onset however patient's most previous blood work was done in October where hemoglobin was 13.  CT of the head and neck revealed no traumatic injury and CT reveals no obvious source of bleeding.    Patient overall is feeling better after medications however I do believe she benefit from observation given her black Hemoccult stool with worsening anemia and syncopal episodes.  I do believe she requires close observation and GI intervention especially if her symptoms or hemoglobin worsen.  I spoke  to the hospitalist who is agreeable with this plan.  Patient and patient's  are agreeable to plan of care and remain hemodynamically stable in the ER.      This note was dictated using a speech recognition program.  While an attempt was made at proof-reading to minimize errors, minor errors in transcription may be present             Kemah Coma Scale Score: 15                        Medical Decision Making      Procedure  Procedures     Ramon Johansen PA-C  08/01/24 7918

## 2024-08-01 NOTE — Clinical Note
Patient tolerated procedure well. Appears comfortable with no complaints of pain. VS stable. Arousable prior to transport. Patient transported to 9S  via cart.  Report called   9S RN   . Handoff completed

## 2024-08-01 NOTE — Clinical Note
Huddle and Timeout completed together with team. Patient wristband and LULA information verified.  Anesthesia safety check completed

## 2024-08-02 ENCOUNTER — ANESTHESIA EVENT (OUTPATIENT)
Dept: GASTROENTEROLOGY | Facility: HOSPITAL | Age: 64
End: 2024-08-02
Payer: COMMERCIAL

## 2024-08-02 ENCOUNTER — ANESTHESIA (OUTPATIENT)
Dept: GASTROENTEROLOGY | Facility: HOSPITAL | Age: 64
End: 2024-08-02
Payer: COMMERCIAL

## 2024-08-02 ENCOUNTER — APPOINTMENT (OUTPATIENT)
Dept: GASTROENTEROLOGY | Facility: HOSPITAL | Age: 64
End: 2024-08-02
Payer: COMMERCIAL

## 2024-08-02 PROBLEM — D64.9 ANEMIA: Status: ACTIVE | Noted: 2024-08-02

## 2024-08-02 LAB
ANION GAP SERPL CALC-SCNC: 7 MMOL/L (ref 10–20)
APPEARANCE UR: CLEAR
BASOPHILS # BLD AUTO: 0.03 X10*3/UL (ref 0–0.1)
BASOPHILS NFR BLD AUTO: 0.3 %
BILIRUB UR STRIP.AUTO-MCNC: NEGATIVE MG/DL
BUN SERPL-MCNC: 26 MG/DL (ref 6–23)
CALCIUM SERPL-MCNC: 9 MG/DL (ref 8.6–10.3)
CHLORIDE SERPL-SCNC: 109 MMOL/L (ref 98–107)
CO2 SERPL-SCNC: 28 MMOL/L (ref 21–32)
COLOR UR: ABNORMAL
CREAT SERPL-MCNC: 0.54 MG/DL (ref 0.5–1.05)
EGFRCR SERPLBLD CKD-EPI 2021: >90 ML/MIN/1.73M*2
EOSINOPHIL # BLD AUTO: 0 X10*3/UL (ref 0–0.7)
EOSINOPHIL NFR BLD AUTO: 0 %
ERYTHROCYTE [DISTWIDTH] IN BLOOD BY AUTOMATED COUNT: 12.7 % (ref 11.5–14.5)
GLUCOSE SERPL-MCNC: 108 MG/DL (ref 74–99)
GLUCOSE UR STRIP.AUTO-MCNC: NORMAL MG/DL
HCT VFR BLD AUTO: 25.1 % (ref 36–46)
HGB BLD-MCNC: 8.2 G/DL (ref 12–16)
HOLD SPECIMEN: NORMAL
HOLD SPECIMEN: NORMAL
IMM GRANULOCYTES # BLD AUTO: 0.05 X10*3/UL (ref 0–0.7)
IMM GRANULOCYTES NFR BLD AUTO: 0.5 % (ref 0–0.9)
KETONES UR STRIP.AUTO-MCNC: NEGATIVE MG/DL
LEUKOCYTE ESTERASE UR QL STRIP.AUTO: ABNORMAL
LYMPHOCYTES # BLD AUTO: 2.26 X10*3/UL (ref 1.2–4.8)
LYMPHOCYTES NFR BLD AUTO: 23.5 %
MAGNESIUM SERPL-MCNC: 2.07 MG/DL (ref 1.6–2.4)
MCH RBC QN AUTO: 31.9 PG (ref 26–34)
MCHC RBC AUTO-ENTMCNC: 32.7 G/DL (ref 32–36)
MCV RBC AUTO: 98 FL (ref 80–100)
MONOCYTES # BLD AUTO: 0.54 X10*3/UL (ref 0.1–1)
MONOCYTES NFR BLD AUTO: 5.6 %
MUCOUS THREADS #/AREA URNS AUTO: NORMAL /LPF
NEUTROPHILS # BLD AUTO: 6.72 X10*3/UL (ref 1.2–7.7)
NEUTROPHILS NFR BLD AUTO: 70.1 %
NITRITE UR QL STRIP.AUTO: NEGATIVE
NRBC BLD-RTO: 0 /100 WBCS (ref 0–0)
PH UR STRIP.AUTO: 6 [PH]
PLATELET # BLD AUTO: 213 X10*3/UL (ref 150–450)
POTASSIUM SERPL-SCNC: 4.3 MMOL/L (ref 3.5–5.3)
PROT UR STRIP.AUTO-MCNC: NEGATIVE MG/DL
RBC # BLD AUTO: 2.57 X10*6/UL (ref 4–5.2)
RBC # UR STRIP.AUTO: NEGATIVE /UL
RBC #/AREA URNS AUTO: NORMAL /HPF
SODIUM SERPL-SCNC: 140 MMOL/L (ref 136–145)
SP GR UR STRIP.AUTO: 1.02
SQUAMOUS #/AREA URNS AUTO: NORMAL /HPF
UROBILINOGEN UR STRIP.AUTO-MCNC: NORMAL MG/DL
WBC # BLD AUTO: 9.6 X10*3/UL (ref 4.4–11.3)
WBC #/AREA URNS AUTO: NORMAL /HPF

## 2024-08-02 PROCEDURE — 2500000004 HC RX 250 GENERAL PHARMACY W/ HCPCS (ALT 636 FOR OP/ED): Performed by: STUDENT IN AN ORGANIZED HEALTH CARE EDUCATION/TRAINING PROGRAM

## 2024-08-02 PROCEDURE — 88305 TISSUE EXAM BY PATHOLOGIST: CPT | Performed by: PATHOLOGY

## 2024-08-02 PROCEDURE — 2500000004 HC RX 250 GENERAL PHARMACY W/ HCPCS (ALT 636 FOR OP/ED): Performed by: NURSE ANESTHETIST, CERTIFIED REGISTERED

## 2024-08-02 PROCEDURE — G0378 HOSPITAL OBSERVATION PER HR: HCPCS

## 2024-08-02 PROCEDURE — 2500000001 HC RX 250 WO HCPCS SELF ADMINISTERED DRUGS (ALT 637 FOR MEDICARE OP): Performed by: NURSE PRACTITIONER

## 2024-08-02 PROCEDURE — 3700000001 HC GENERAL ANESTHESIA TIME - INITIAL BASE CHARGE

## 2024-08-02 PROCEDURE — 2500000002 HC RX 250 W HCPCS SELF ADMINISTERED DRUGS (ALT 637 FOR MEDICARE OP, ALT 636 FOR OP/ED): Performed by: STUDENT IN AN ORGANIZED HEALTH CARE EDUCATION/TRAINING PROGRAM

## 2024-08-02 PROCEDURE — 2500000001 HC RX 250 WO HCPCS SELF ADMINISTERED DRUGS (ALT 637 FOR MEDICARE OP): Performed by: STUDENT IN AN ORGANIZED HEALTH CARE EDUCATION/TRAINING PROGRAM

## 2024-08-02 PROCEDURE — C2617 STENT, NON-COR, TEM W/O DEL: HCPCS

## 2024-08-02 PROCEDURE — 43239 EGD BIOPSY SINGLE/MULTIPLE: CPT | Performed by: STUDENT IN AN ORGANIZED HEALTH CARE EDUCATION/TRAINING PROGRAM

## 2024-08-02 PROCEDURE — 3700000002 HC GENERAL ANESTHESIA TIME - EACH INCREMENTAL 1 MINUTE

## 2024-08-02 PROCEDURE — 88305 TISSUE EXAM BY PATHOLOGIST: CPT | Mod: TC,ELYLAB | Performed by: STUDENT IN AN ORGANIZED HEALTH CARE EDUCATION/TRAINING PROGRAM

## 2024-08-02 PROCEDURE — 43255 EGD CONTROL BLEEDING ANY: CPT | Performed by: STUDENT IN AN ORGANIZED HEALTH CARE EDUCATION/TRAINING PROGRAM

## 2024-08-02 PROCEDURE — 99233 SBSQ HOSP IP/OBS HIGH 50: CPT | Performed by: STUDENT IN AN ORGANIZED HEALTH CARE EDUCATION/TRAINING PROGRAM

## 2024-08-02 PROCEDURE — 7500019 HC EGD

## 2024-08-02 PROCEDURE — 96361 HYDRATE IV INFUSION ADD-ON: CPT | Mod: 59

## 2024-08-02 PROCEDURE — 87086 URINE CULTURE/COLONY COUNT: CPT | Mod: ELYLAB

## 2024-08-02 PROCEDURE — 80048 BASIC METABOLIC PNL TOTAL CA: CPT | Performed by: STUDENT IN AN ORGANIZED HEALTH CARE EDUCATION/TRAINING PROGRAM

## 2024-08-02 PROCEDURE — 96376 TX/PRO/DX INJ SAME DRUG ADON: CPT | Mod: 59

## 2024-08-02 PROCEDURE — 96375 TX/PRO/DX INJ NEW DRUG ADDON: CPT | Mod: 59

## 2024-08-02 PROCEDURE — 85025 COMPLETE CBC W/AUTO DIFF WBC: CPT | Performed by: STUDENT IN AN ORGANIZED HEALTH CARE EDUCATION/TRAINING PROGRAM

## 2024-08-02 PROCEDURE — 2720000007 HC OR 272 NO HCPCS

## 2024-08-02 PROCEDURE — 83735 ASSAY OF MAGNESIUM: CPT | Performed by: STUDENT IN AN ORGANIZED HEALTH CARE EDUCATION/TRAINING PROGRAM

## 2024-08-02 PROCEDURE — C9113 INJ PANTOPRAZOLE SODIUM, VIA: HCPCS | Performed by: STUDENT IN AN ORGANIZED HEALTH CARE EDUCATION/TRAINING PROGRAM

## 2024-08-02 PROCEDURE — 99222 1ST HOSP IP/OBS MODERATE 55: CPT | Performed by: NURSE PRACTITIONER

## 2024-08-02 PROCEDURE — 36415 COLL VENOUS BLD VENIPUNCTURE: CPT | Performed by: STUDENT IN AN ORGANIZED HEALTH CARE EDUCATION/TRAINING PROGRAM

## 2024-08-02 PROCEDURE — 81001 URINALYSIS AUTO W/SCOPE: CPT

## 2024-08-02 RX ORDER — PROPOFOL 10 MG/ML
INJECTION, EMULSION INTRAVENOUS AS NEEDED
Status: DISCONTINUED | OUTPATIENT
Start: 2024-08-02 | End: 2024-08-02

## 2024-08-02 RX ORDER — ACETAMINOPHEN 160 MG/5ML
650 SOLUTION ORAL EVERY 4 HOURS PRN
Status: DISCONTINUED | OUTPATIENT
Start: 2024-08-02 | End: 2024-08-03 | Stop reason: HOSPADM

## 2024-08-02 RX ORDER — SUCRALFATE 1 G/1
1 TABLET ORAL EVERY 6 HOURS SCHEDULED
Status: DISCONTINUED | OUTPATIENT
Start: 2024-08-02 | End: 2024-08-03 | Stop reason: HOSPADM

## 2024-08-02 RX ORDER — DEXTROMETHORPHAN/PSEUDOEPHED 2.5-7.5/.8
DROPS ORAL AS NEEDED
Status: COMPLETED | OUTPATIENT
Start: 2024-08-02 | End: 2024-08-02

## 2024-08-02 RX ORDER — TRAMADOL HYDROCHLORIDE 50 MG/1
50 TABLET ORAL EVERY 6 HOURS PRN
Status: DISCONTINUED | OUTPATIENT
Start: 2024-08-02 | End: 2024-08-03 | Stop reason: HOSPADM

## 2024-08-02 RX ORDER — LEVOTHYROXINE SODIUM 88 UG/1
88 TABLET ORAL DAILY
Status: DISCONTINUED | OUTPATIENT
Start: 2024-08-02 | End: 2024-08-03 | Stop reason: HOSPADM

## 2024-08-02 RX ORDER — ACETAMINOPHEN 325 MG/1
650 TABLET ORAL EVERY 4 HOURS PRN
Status: DISCONTINUED | OUTPATIENT
Start: 2024-08-02 | End: 2024-08-03 | Stop reason: HOSPADM

## 2024-08-02 RX ORDER — POLYETHYLENE GLYCOL 3350 17 G/17G
17 POWDER, FOR SOLUTION ORAL DAILY PRN
Status: DISCONTINUED | OUTPATIENT
Start: 2024-08-02 | End: 2024-08-03 | Stop reason: HOSPADM

## 2024-08-02 RX ORDER — ACETAMINOPHEN 650 MG/1
650 SUPPOSITORY RECTAL EVERY 4 HOURS PRN
Status: DISCONTINUED | OUTPATIENT
Start: 2024-08-02 | End: 2024-08-03 | Stop reason: HOSPADM

## 2024-08-02 RX ORDER — OXYCODONE HYDROCHLORIDE 5 MG/1
5 TABLET ORAL EVERY 6 HOURS PRN
Status: DISCONTINUED | OUTPATIENT
Start: 2024-08-02 | End: 2024-08-02

## 2024-08-02 RX ORDER — SODIUM CHLORIDE, SODIUM LACTATE, POTASSIUM CHLORIDE, CALCIUM CHLORIDE 600; 310; 30; 20 MG/100ML; MG/100ML; MG/100ML; MG/100ML
100 INJECTION, SOLUTION INTRAVENOUS CONTINUOUS
Status: DISCONTINUED | OUTPATIENT
Start: 2024-08-02 | End: 2024-08-02

## 2024-08-02 RX ORDER — VENLAFAXINE HYDROCHLORIDE 150 MG/1
150 CAPSULE, EXTENDED RELEASE ORAL DAILY
Status: DISCONTINUED | OUTPATIENT
Start: 2024-08-02 | End: 2024-08-03 | Stop reason: HOSPADM

## 2024-08-02 RX ORDER — PANTOPRAZOLE SODIUM 40 MG/10ML
40 INJECTION, POWDER, LYOPHILIZED, FOR SOLUTION INTRAVENOUS 2 TIMES DAILY
Status: DISCONTINUED | OUTPATIENT
Start: 2024-08-02 | End: 2024-08-03 | Stop reason: HOSPADM

## 2024-08-02 RX ORDER — ONDANSETRON HYDROCHLORIDE 2 MG/ML
4 INJECTION, SOLUTION INTRAVENOUS EVERY 6 HOURS PRN
Status: DISCONTINUED | OUTPATIENT
Start: 2024-08-02 | End: 2024-08-03 | Stop reason: HOSPADM

## 2024-08-02 RX ORDER — EPINEPHRINE 1 MG/ML
INJECTION, SOLUTION, CONCENTRATE INTRAVENOUS AS NEEDED
Status: COMPLETED | OUTPATIENT
Start: 2024-08-02 | End: 2024-08-02

## 2024-08-02 RX ADMIN — LEVOTHYROXINE SODIUM 88 MCG: 0.09 TABLET ORAL at 16:39

## 2024-08-02 RX ADMIN — SODIUM CHLORIDE, POTASSIUM CHLORIDE, SODIUM LACTATE AND CALCIUM CHLORIDE 100 ML/HR: 600; 310; 30; 20 INJECTION, SOLUTION INTRAVENOUS at 10:59

## 2024-08-02 RX ADMIN — PANTOPRAZOLE SODIUM 40 MG: 40 INJECTION, POWDER, FOR SOLUTION INTRAVENOUS at 03:27

## 2024-08-02 RX ADMIN — VENLAFAXINE HYDROCHLORIDE 150 MG: 150 CAPSULE, EXTENDED RELEASE ORAL at 17:00

## 2024-08-02 RX ADMIN — PROPOFOL 200 MG: 10 INJECTION, EMULSION INTRAVENOUS at 14:08

## 2024-08-02 RX ADMIN — PROPOFOL 100 MG: 10 INJECTION, EMULSION INTRAVENOUS at 14:15

## 2024-08-02 RX ADMIN — TRAMADOL HYDROCHLORIDE 50 MG: 50 TABLET, FILM COATED ORAL at 18:57

## 2024-08-02 RX ADMIN — ACETAMINOPHEN 650 MG: 325 TABLET ORAL at 16:39

## 2024-08-02 RX ADMIN — SIMETHICONE 333 MG: 20 EMULSION ORAL at 14:11

## 2024-08-02 RX ADMIN — ACETAMINOPHEN 650 MG: 325 TABLET ORAL at 17:00

## 2024-08-02 RX ADMIN — ONDANSETRON 4 MG: 2 INJECTION INTRAMUSCULAR; INTRAVENOUS at 10:59

## 2024-08-02 RX ADMIN — EPINEPHRINE 1 MG: 1 INJECTION, SOLUTION, CONCENTRATE INTRAVENOUS at 14:17

## 2024-08-02 RX ADMIN — VENLAFAXINE HYDROCHLORIDE 150 MG: 150 CAPSULE, EXTENDED RELEASE ORAL at 16:39

## 2024-08-02 RX ADMIN — PROPOFOL 100 MG: 10 INJECTION, EMULSION INTRAVENOUS at 14:16

## 2024-08-02 RX ADMIN — LEVOTHYROXINE SODIUM 88 MCG: 0.09 TABLET ORAL at 17:00

## 2024-08-02 RX ADMIN — PANTOPRAZOLE SODIUM 40 MG: 40 INJECTION, POWDER, FOR SOLUTION INTRAVENOUS at 22:36

## 2024-08-02 RX ADMIN — SUCRALFATE 1 G: 1 TABLET ORAL at 17:08

## 2024-08-02 RX ADMIN — SUCRALFATE 1 G: 1 TABLET ORAL at 22:37

## 2024-08-02 SDOH — SOCIAL STABILITY: SOCIAL INSECURITY: DO YOU FEEL UNSAFE GOING BACK TO THE PLACE WHERE YOU ARE LIVING?: NO

## 2024-08-02 SDOH — HEALTH STABILITY: MENTAL HEALTH: CURRENT SMOKER: 0

## 2024-08-02 SDOH — SOCIAL STABILITY: SOCIAL INSECURITY: ARE THERE ANY APPARENT SIGNS OF INJURIES/BEHAVIORS THAT COULD BE RELATED TO ABUSE/NEGLECT?: NO

## 2024-08-02 SDOH — SOCIAL STABILITY: SOCIAL INSECURITY: WERE YOU ABLE TO COMPLETE ALL THE BEHAVIORAL HEALTH SCREENINGS?: YES

## 2024-08-02 SDOH — SOCIAL STABILITY: SOCIAL INSECURITY: HAS ANYONE EVER THREATENED TO HURT YOUR FAMILY OR YOUR PETS?: NO

## 2024-08-02 SDOH — SOCIAL STABILITY: SOCIAL INSECURITY: HAVE YOU HAD ANY THOUGHTS OF HARMING ANYONE ELSE?: NO

## 2024-08-02 SDOH — SOCIAL STABILITY: SOCIAL INSECURITY: DOES ANYONE TRY TO KEEP YOU FROM HAVING/CONTACTING OTHER FRIENDS OR DOING THINGS OUTSIDE YOUR HOME?: NO

## 2024-08-02 SDOH — SOCIAL STABILITY: SOCIAL INSECURITY: ABUSE: ADULT

## 2024-08-02 SDOH — SOCIAL STABILITY: SOCIAL INSECURITY: DO YOU FEEL ANYONE HAS EXPLOITED OR TAKEN ADVANTAGE OF YOU FINANCIALLY OR OF YOUR PERSONAL PROPERTY?: NO

## 2024-08-02 SDOH — SOCIAL STABILITY: SOCIAL INSECURITY: ARE YOU OR HAVE YOU BEEN THREATENED OR ABUSED PHYSICALLY, EMOTIONALLY, OR SEXUALLY BY ANYONE?: NO

## 2024-08-02 ASSESSMENT — PAIN - FUNCTIONAL ASSESSMENT
PAIN_FUNCTIONAL_ASSESSMENT: 0-10
PAIN_FUNCTIONAL_ASSESSMENT: 0-10

## 2024-08-02 ASSESSMENT — COGNITIVE AND FUNCTIONAL STATUS - GENERAL
MOBILITY SCORE: 24
DAILY ACTIVITIY SCORE: 24
DAILY ACTIVITIY SCORE: 24
MOBILITY SCORE: 24
MOBILITY SCORE: 24
PATIENT BASELINE BEDBOUND: NO

## 2024-08-02 ASSESSMENT — ACTIVITIES OF DAILY LIVING (ADL)
ADEQUATE_TO_COMPLETE_ADL: YES
GROOMING: INDEPENDENT
HEARING - RIGHT EAR: FUNCTIONAL
WALKS IN HOME: INDEPENDENT
FEEDING YOURSELF: INDEPENDENT
JUDGMENT_ADEQUATE_SAFELY_COMPLETE_DAILY_ACTIVITIES: YES
PATIENT'S MEMORY ADEQUATE TO SAFELY COMPLETE DAILY ACTIVITIES?: YES
TOILETING: INDEPENDENT
DRESSING YOURSELF: INDEPENDENT
BATHING: INDEPENDENT

## 2024-08-02 ASSESSMENT — PAIN DESCRIPTION - LOCATION
LOCATION: HEAD
LOCATION: HEAD

## 2024-08-02 ASSESSMENT — PAIN SCALES - GENERAL
PAINLEVEL_OUTOF10: 5 - MODERATE PAIN
PAIN_LEVEL: 0
PAINLEVEL_OUTOF10: 5 - MODERATE PAIN
PAINLEVEL_OUTOF10: 0 - NO PAIN
PAINLEVEL_OUTOF10: 0 - NO PAIN

## 2024-08-02 ASSESSMENT — PAIN DESCRIPTION - ORIENTATION
ORIENTATION: MID;ANTERIOR
ORIENTATION: MID;ANTERIOR

## 2024-08-02 NOTE — CONSULTS
Department of Internal Medicine  Gastroenterology  Consult note    IMPRESSION/RECOMMENDATIONS  Melena  Symptomatic anemia with syncope. Hgb 10.8-->8.2 (Baseline Hgb 13-14)  Routine NSAID use - Ibuprofen 600 mg daily for chronic pain x many years    -Trend H&H and transfuse PRBC for Hgb <7-8.0  -Monitor stool color and consistency and document  -Keep n.p.o.  -Protonix 40 mg IV twice daily  -Recommend EGD  -Further recommendations pending EGD results    EGD 8/2/2024 indicated for melena and anemia  Widely patent Schatzki ring in the distal esophagus  2 cm hiatal hernia (Hill grade III).  2 small ulcers in the antrum with clean base (Keon III) s/p H. pylori biopsies.  Duodenal ulcer with associated stricture (able to pass scope) with oozing hemorrhage (Keon IB) s/p epi injection (2 cc) + gold probe +1 clip.    -Await pathology  -Repeat EGD in 3 months due October 31, 2024  -PPI 40 twice daily until repeat EGD, sent home with Rx  -Add Carafate 1 g p.o. 4 times daily x 30 days  -Strict avoidance of NSAIDs i.e. ibuprofen, Advil, Aleve, aspirin, Excedrin, Naprosyn, naproxen  -May have clear liquids, advance diet as tolerated  -Continue to monitor patient, repeat H&H in a.m.  -Follow-up with GI in 2 weeks to review pathology report    Discussed with Dr. Maradiaga and Dr. Alexis      Reason for Consult: GIB    History of Present Illness   Cielo Peters is a 64 y.o. female with a significant past medical history of hypothyroidism, depression, anxiety, hyperlipidemia presented from home for suncope x 2. Patient states that over the last 24 hours she has had 5-6 liquidy black stools.  Denies hematemesis or petr red blood per rectum.  Patient does admit to taking 600 mg of ibuprofen daily for chronic pain prescribed by her pain management doctor for many years.      On arrival to ED, patient was afebrile and hemodynamically stable.  Initial blood work shows no leukocytosis, Hgb 10.8, HCT 32.7%, MCV 96.  Normal  platelets.  Repeat hemoglobin down to 8.2, HCT 25.1%.  (Baseline Hgb 13-14). INR 1.0.  BUN 47, creatinine 0.54 concerning for upper GI bleed.  Otherwise CMP unremarkable.  LFTs normal, lactate, lipase and troponin normal. CT angio chest abdomen pelvis shows no evidence of active arterial GI bleed.    GI has been consulted for melena and symptomatic anemia.  Patient was seen and examined at bedside,  present.  Patient reports dizziness, worse with standing.  Patient reports having black tarry stools x 2 days.  Taking 600 mg ibuprofen daily for chronic pain.  She denies unintentional weight loss, epigastric pain, nausea, vomiting, decreased appetite or early satiety.  No history of GI bleeding in the past.  Never had EGD.  Patient did have a colonoscopy in October, 2023 with 1 small benign polyp removed from the descending colon, otherwise normal.  No family history of GI malignancies.    ENDOSCOPIC REVIEW  EGD: Never    Colonoscopy: 10/27/2023 with Dr. Quevedo indicated for CRC screening, history of colon polyps  The cecum, ascending colon, hepatic flexure, transverse colon, splenic flexure, sigmoid colon and rectum appeared normal.  One 3 mm polyp in the descending colon; performed cold forceps biopsy with piecemeal removal  Pathology showed polypoid fragment of colonic mucosa, no epithelial polyp is identified  Repeat colonoscopy in 5 years    Colonoscopy 7/24/2017 with Dr. Luther indicated for diarrhea alternating with constipation  -Normal colonoscopy  -Mild to moderate amount of colonic spasm     Allergies  Atorvastatin and Codeine    Current Medication    Current Facility-Administered Medications:     acetaminophen (Tylenol) tablet 650 mg, 650 mg, oral, q4h PRN **OR** acetaminophen (Tylenol) oral liquid 650 mg, 650 mg, nasogastric tube, q4h PRN **OR** acetaminophen (Tylenol) suppository 650 mg, 650 mg, rectal, q4h PRN, José Manuel Santillan MD    lactated Ringer's infusion, 100 mL/hr, intravenous, Continuous,  José Manuel Santillan MD    pantoprazole (ProtoNix) injection 40 mg, 40 mg, intravenous, BID, José Manuel Santillan MD, 40 mg at 08/02/24 0327    polyethylene glycol (Glycolax, Miralax) packet 17 g, 17 g, oral, Daily PRN, José Manuel Santillan MD    Past Medical History  Active Ambulatory Problems     Diagnosis Date Noted    Anxiety 06/20/2023    CAD (coronary artery disease) 06/20/2023    Candidal intertrigo 06/20/2023    Depression 06/20/2023    Difficulty sleeping 07/20/2021    History of basal cell carcinoma (BCC) 07/20/2021    History of squamous cell carcinoma 07/20/2021    Hyperglycemia 06/20/2023    Hypothyroidism 06/20/2023    Mixed hyperlipidemia 06/20/2023    Post laminectomy syndrome 12/17/2019    Rosacea 07/20/2021    Stenosis of lumbosacral spine 11/03/2016    TMJ (temporomandibular joint disorder) 07/02/2013    Overweight with body mass index (BMI) 25.0-29.9 06/20/2023    Arthralgia 07/20/2021    Conductive hearing loss, bilateral 07/20/2021    Dysfunction of eustachian tube 07/20/2021    Hematuria 07/20/2021    Abnormal mammogram 10/26/2023    Nephrolithiasis 10/26/2023    Skin lesion of face 10/26/2023    PONV (postoperative nausea and vomiting) 10/27/2023     Resolved Ambulatory Problems     Diagnosis Date Noted    No Resolved Ambulatory Problems     Past Medical History:   Diagnosis Date    Basal cell carcinoma     History of mammogram 08/17/2023       Past Surgical History  Past Surgical History:   Procedure Laterality Date    BACK SURGERY  2017    BREAST BIOPSY  2013    CARDIAC CATHETERIZATION  2014    mostly (-) per pt    CARPAL TUNNEL RELEASE Right 1984    CHOLECYSTECTOMY  2013    COLONOSCOPY W/ POLYPECTOMY  10/2023    wnl-due 2028    HYSTERECTOMY  2009    tahbso no ca    LITHOTRIPSY Right 11/2020       Family History  Family History   Problem Relation Name Age of Onset    No Known Problems Mother      Cancer Maternal Grandmother      Skin cancer Maternal Grandfather      Kidney failure Paternal Grandmother    "    No family history of GI malignancies     Social History  TOBACCO:  reports that she has never smoked. She has never used smokeless tobacco.  ETOH:  reports no history of alcohol use.  DRUGS:  reports no history of drug use.    Review of Systems  Review of systems negative unless otherwise stated above.    PHYSICAL EXAM  VS: BP 93/54 (Patient Position: Sitting)   Pulse (!) 122   Temp 36 °C (96.8 °F)   Resp 16   Ht 1.575 m (5' 2\")   Wt 72.7 kg (160 lb 4.4 oz)   LMP  (LMP Unknown)   SpO2 99%   BMI 29.31 kg/m²  Body mass index is 29.31 kg/m².    Constitutional:       General: She is not in acute distress.     Appearance: Normal appearance. She is not toxic-appearing.   HENT:      Head: Normocephalic and atraumatic.      Mouth: Mucous membranes are moist.      Pharynx: Oropharynx is clear.     Eyes:      Extraocular Movements: Extraocular movements intact.      Conjunctiva/sclera: Conjunctivae normal.      Pupils: Pupils are equal, round, and reactive to light.   Cardiovascular:      Rate and Rhythm: Normal rate and regular rhythm.      Pulses: Normal pulses.      Heart sounds: Normal heart sounds.   Pulmonary:      Effort: Pulmonary effort is normal. No respiratory distress.      Breath sounds: Normal breath sounds. No wheezing.   Abdominal:      General: Bowel sounds are normal. There is no distension.      Palpations: Abdomen is soft.      Tenderness: There is no abdominal tenderness. There is no guarding.   Musculoskeletal:         General: Normal range of motion.      Cervical back: Normal range of motion and neck supple.   Neurological:      General: No focal deficit present.      Mental Status: She is alert and oriented to person, place, and time. Mental status is at baseline.   Psychiatric:         Mood and Affect: Mood normal.         Behavior: Behavior normal.         Thought Content: Thought content normal.     DATA  Recent blood work and relevant radiology and endoscopic studies were reviewed and " discussed with the patient   Results from last 7 days   Lab Units 08/02/24  0644   WBC AUTO x10*3/uL 9.6   RBC AUTO x10*6/uL 2.57*   HEMOGLOBIN g/dL 8.2*   HEMATOCRIT % 25.1*   MCV fL 98   MCHC g/dL 32.7   RDW % 12.7   PLATELETS AUTO x10*3/uL 213       Results from last 72 hours   Lab Units 08/02/24  0644 08/01/24  1424   SODIUM mmol/L 140 135*   POTASSIUM mmol/L 4.3 3.8   CHLORIDE mmol/L 109* 103   CO2 mmol/L 28 25   BUN mg/dL 26* 47*   CREATININE mg/dL 0.54 0.54   CALCIUM mg/dL 9.0 9.3   PROTEIN TOTAL g/dL  --  6.8   BILIRUBIN TOTAL mg/dL  --  0.2   ALK PHOS U/L  --  76   AST U/L  --  14   ALT U/L  --  13       Results from last 72 hours   Lab Units 08/01/24  1424   INR  1.0         RADIOLOGY REVIEW  === 08/01/24 ===    CT ANGIO CHEST ABDOMEN PELVIS    - Impression -  1. No evidence of active arterial gastrointestinal bleed. If there is  persistent concern correlation with dedicated RBC scan can be  considered.  2. Punctate calcification in the left breast, correlate with any  recent mammogram.    Other findings as above.    MACRO:  None.    Signed by: Dino Ford 8/1/2024 6:23 PM  Dictation workstation:   [id number]       (Electronically signed byKIKI Yoder on 8/2/2024 at 8:59 AM)

## 2024-08-02 NOTE — ANESTHESIA PREPROCEDURE EVALUATION
Patient: Cielo Peters    Procedure Information       Date/Time: 08/02/24 1410    Scheduled providers: Mckinley Williamson MD    Procedure: EGD    Location: [facility]            Relevant Problems   Anesthesia   (+) PONV (postoperative nausea and vomiting)      Cardiac   (+) CAD (coronary artery disease)   (+) Mixed hyperlipidemia      Neuro   (+) Anxiety   (+) Depression      /Renal   (+) Nephrolithiasis      Endocrine   (+) Hypothyroidism      Hematology   (+) Anemia      Musculoskeletal   (+) Stenosis of lumbosacral spine      HEENT   (+) Conductive hearing loss, bilateral      ID   (+) Candidal intertrigo       Clinical information reviewed:    Allergies  Meds               NPO Detail:  No data recorded     Physical Exam    Airway  Mallampati: II  TM distance: >3 FB  Neck ROM: full     Cardiovascular    Dental    Pulmonary    Abdominal        Anesthesia Plan    History of general anesthesia?: yes  History of complications of general anesthesia?: no    ASA 3     MAC     The patient is not a current smoker.    intravenous induction   Anesthetic plan and risks discussed with patient.    Plan discussed with CRNA.

## 2024-08-02 NOTE — PROGRESS NOTES
Rounded w/ dr woodward.  Attempted to see pt. To do initial assessment. Pt is off the floor at EGD.. Per dr woodward no discharge today.

## 2024-08-02 NOTE — PROGRESS NOTES
Medical Group Progress Note  ASSESSMENT & PLAN:     Acute symptomatic blood loss anemia, rule out upper GI bleed  - Presenting from home with 5-6 black tarry stools and 2 syncopal episodes on 7/31-8/1  - Risk factors include excessive daily ibuprofen use with around 600 mg daily for many years due to chronic back pain as prescribed to her by her pain physician  - Hemoglobin 8.2 today (>14 in October 2023, 10.9 on admission)  PLAN:  - Protonix 40 mg IV twice daily  - Transfuse for hemoglobin <7.0 or any dynamic instability  - GI consulted  - EGD today  - Colonoscopy October 2023 reviewed with one 3 mm polyp resected     Syncope  - 2 episodes of syncope 7/31-8/1 due to blood loss as per above  - Orthostats in the morning     Left breast calcification  - As seen on CTA, mammogram from 2023 reviewed  - Will need repeat mammogram with PCP this year as an outpatient     Hypothyroidism  Hyperlipidemia  Generalized anxiety  Depression  - Resume home medications once reconciled     VTE prophylaxis: SCDs only (no chemoprophylaxis for concerns for ongoing GIB).     Disposition/Daily update: Hemoglobin continues to drop, no episodes of syncope however after admission.  Plan for EGD today.  Further recommendations post EGD.      ---Of note, this documentation is completed using the Dragon Dictation system (voice recognition software). There may be spelling and/or grammatical errors that were not corrected prior to final submission.---    José Manuel Santillan MD    SUBJECTIVE-     Patient was seen and examined bedside this morning.  Had no episodes of syncope or dark stools since admission yesterday evening.  Aware of plan for EGD today.    OBJECTIVE:     Last Recorded Vitals:  Vitals:    08/01/24 2032 08/02/24 0332 08/02/24 0359 08/02/24 0748   BP: 105/55  120/60 93/54   BP Location: Right arm  Right arm    Patient Position: Sitting  Sitting Sitting   Pulse: 90  93 (!) 122   Resp: 18  16 16   Temp:   37 °C (98.6 °F) 36 °C (96.8  "°F)   TempSrc:   Temporal    SpO2: 100%  99% 99%   Weight:  72.7 kg (160 lb 4.4 oz)     Height:  1.575 m (5' 2\")       Last I/O:  I/O last 3 completed shifts:  In: 1100 (15.1 mL/kg) [I.V.:1100 (15.1 mL/kg)]  Out: - (0 mL/kg)   Weight: 72.7 kg     Physical Exam  Vitals reviewed.   Constitutional:       General: She is not in acute distress.     Appearance: Normal appearance. She is not toxic-appearing.   HENT:      Head: Normocephalic and atraumatic.      Nose: Nose normal.      Mouth/Throat:      Mouth: Mucous membranes are moist.   Eyes:      Extraocular Movements: Extraocular movements intact.      Conjunctiva/sclera: Conjunctivae normal.   Cardiovascular:      Rate and Rhythm: Normal rate and regular rhythm.      Pulses: Normal pulses.      Heart sounds: Normal heart sounds.   Pulmonary:      Effort: Pulmonary effort is normal. No respiratory distress.      Breath sounds: Normal breath sounds. No wheezing.   Abdominal:      General: Bowel sounds are normal. There is no distension.      Palpations: Abdomen is soft.      Tenderness: There is no abdominal tenderness. There is no guarding.   Musculoskeletal:         General: Normal range of motion.      Cervical back: Normal range of motion and neck supple.   Neurological:      General: No focal deficit present.      Mental Status: She is alert and oriented to person, place, and time. Mental status is at baseline.   Psychiatric:         Mood and Affect: Mood normal.         Behavior: Behavior normal.         Thought Content: Thought content normal.     Inpatient Medications:  pantoprazole, 40 mg, intravenous, BID    PRN Medications  PRN medications: acetaminophen **OR** acetaminophen **OR** acetaminophen, polyethylene glycol  Continuous Medications:  lactated Ringer's, 100 mL/hr      LABS AND IMAGING:     Labs:  Results from last 7 days   Lab Units 08/02/24  0644 08/01/24  1424   WBC AUTO x10*3/uL 9.6 9.7   RBC AUTO x10*6/uL 2.57* 3.41*   HEMOGLOBIN g/dL 8.2* 10.8* "   HEMATOCRIT % 25.1* 32.7*   MCV fL 98 96   MCH pg 31.9 31.7   MCHC g/dL 32.7 33.0   RDW % 12.7 12.1   PLATELETS AUTO x10*3/uL 213 260     Results from last 7 days   Lab Units 08/02/24  0644 08/01/24  1424   SODIUM mmol/L 140 135*   POTASSIUM mmol/L 4.3 3.8   CHLORIDE mmol/L 109* 103   CO2 mmol/L 28 25   BUN mg/dL 26* 47*   CREATININE mg/dL 0.54 0.54   GLUCOSE mg/dL 108* 139*   PROTEIN TOTAL g/dL  --  6.8   CALCIUM mg/dL 9.0 9.3   BILIRUBIN TOTAL mg/dL  --  0.2   ALK PHOS U/L  --  76   AST U/L  --  14   ALT U/L  --  13     Results from last 7 days   Lab Units 08/02/24  0644 08/01/24  1424   MAGNESIUM mg/dL 2.07 1.84     Results from last 7 days   Lab Units 08/01/24  1907 08/01/24  1424   TROPHS ng/L 4 <3     Imaging:  CT angio chest abdomen pelvis  Narrative: Interpreted By:  Dino Ford,   STUDY:  CT ANGIO CHEST ABDOMEN PELVIS;  8/1/2024 3:42 pm      INDICATION:  Signs/Symptoms:black stool.      COMPARISON:  CT 04/17/2023      ACCESSION NUMBER(S):  [id number]      ORDERING CLINICIAN:  URMILA HERNANDEZ      TECHNIQUE:  Axial non-contrast images of the chest, abdomen, and pelvis with  coronal and sagittal reformatted images. Axial CT images of the  chest, abdomen and pelvis after the intravenous administration of  using angiographic technique with coronal and sagittal reformatted  images. MIP images were provided and reviewed. 3D reconstructions  were created on a separate independent workstation and reviewed.      FINDINGS:  VASCULATURE:      PULMONARY ARTERIES: No acute pulmonary embolism allowing for  suboptimal contrast bolus timing and extensive motion artifact.      THORACIC AORTA: Non-contrast images show no evidence of acute  intramural hematoma. No thoracic aortic aneurysm or dissection. No  significant thoracic aortic atherosclerosis.      ABDOMINAL AORTA: No abdominal aortic aneurysm or dissection. Minimal  abdominal aortic atherosclerosis.      ABDOMINAL AND PELVIC ARTERIES: No hemodynamically  significant  stenosis or occlusion.          CT CHEST:      MEDIASTINUM AND LYMPH NODES:  No enlarged intrathoracic or axillary  lymph nodes. No pneumomediastinum.      HEART: Normal size. No coronary artery calcifications. No significant  pericardial effusion.      LUNG, PLEURA, LARGE AIRWAYS:  No consolidation, pulmonary edema,  pleural effusion, or pneumothorax.      OSSEOUS STRUCTURES/CHEST WALL:  No acute osseous abnormality.  Punctate calcification in the left breast.      CT ABDOMEN/PELVIS:      LIVER: No significant parenchymal abnormality.      BILE DUCTS: No significant intrahepatic or extrahepatic dilatation.      GALLBLADDER: Surgically absent      PANCREAS: No significant abnormality.      SPLEEN: Unchanged periphery calcified 2.2 cm cystic lesion within the  medial aspect of the spleen.      ADRENALS: No significant abnormality.      KIDNEYS, URETERS, BLADDER: No significant abnormality. Stable right  renal cyst. Urinary bladder is unremarkable.      REPRODUCTIVE ORGANS: Uterus is surgically absent.      VESSELS: (See above). No additional significant abnormality.      RETROPERITONEUM/LYMPH NODES: No enlarged lymph nodes.      BOWEL/MESENTERY/PERITONEUM: No intraluminal contrast enhancement on  arterial or delayed phase imaging. Stomach is decompressed which  limits the evaluation. No inflammatory bowel wall thickening or  dilatation. Normal appendix.No significant ascites, free air, or  fluid collection.          ABDOMINAL WALL: No significant abnormality.      OSSEOUS STRUCTURES: Multilevel degenerative changes throughout the  thoracic and lumbar spine. Redemonstration of posterior fusion  hardware of L4-L5 with an interbody cage.      Impression: 1. No evidence of active arterial gastrointestinal bleed. If there is  persistent concern correlation with dedicated RBC scan can be  considered.  2. Punctate calcification in the left breast, correlate with any  recent mammogram.      Other findings as  above.      MACRO:  None.      Signed by: Dino Ford 8/1/2024 6:23 PM  Dictation workstation:   [id number]  ECG 12 lead  See ED provider note for full interpretation and clinical correlation Normal sinus rhythm  Low voltage QRS  Nonspecific T wave abnormality  Abnormal ECG  No previous ECGs available  Confirmed by Linda Valderrama ([id number]) on 8/1/2024 5:30:45 PM  CT cervical spine wo IV contrast  Narrative: Interpreted By:  Jigar Pollard,   STUDY:  CT CERVICAL SPINE WO IV CONTRAST;  8/1/2024 3:40 pm      INDICATION:  Signs/Symptoms:syncope.      COMPARISON:  None.      ACCESSION NUMBER(S):  [id number]      ORDERING CLINICIAN:  URMILA HERNANDEZ      TECHNIQUE:  Axial CT images of the cervical spine are obtained. Axial, coronal  and sagittal reconstructions are provided for review.      FINDINGS:          Straightening of normal cervical lordosis. Disc space loss from C4  through C6 with endplate sclerosis osteophytes. No erosions. No acute  fracture-dislocation. Mild right-sided neural foraminal narrowing at  C4/C5. Moderate right-sided neural foraminal narrowing at C5/C6.      Impression: No evidence for an acute fracture or subluxation of the cervical  spine.      Multilevel discogenic degenerative changes.      MACRO:  None      Signed by: Jigar Pollard 8/1/2024 4:03 PM  Dictation workstation:   [id number]  CT head wo IV contrast  Narrative: Interpreted By:  Jigar Pollard,   STUDY:  CT HEAD WO IV CONTRAST;  8/1/2024 3:40 pm      INDICATION:  Signs/Symptoms:syncope.      COMPARISON:  None.      ACCESSION NUMBER(S):  [id number]      ORDERING CLINICIAN:  URMILA HERNANDEZ      TECHNIQUE:  Noncontrast axial CT scan of head was performed. Angled reformats in  brain and bone windows were generated. The images were reviewed in  bone, brain, blood and soft tissue windows.      FINDINGS:  CSF Spaces: The ventricles, sulci and basal cisterns are within  normal limits. There is no extraaxial fluid collection.       Parenchyma:  The grey-white differentiation is intact. There is no  mass effect or midline shift.  There is no intracranial hemorrhage.      Calvarium: The calvarium is unremarkable.      Paranasal sinuses and mastoids: Visualized paranasal sinuses and  mastoids are clear.      Impression: No evidence of acute cortical infarct or intracranial hemorrhage.      No evidence of intracranial hemorrhage or displaced skull fracture.      MACRO:  None      Signed by: Jigar Pollard 8/1/2024 3:54 PM  Dictation workstation:   [id number]

## 2024-08-02 NOTE — ANESTHESIA POSTPROCEDURE EVALUATION
Patient: Cielo Peters    Procedure Summary       Date: 08/02/24 Room / Location: [facility]    Anesthesia Start: 1403 Anesthesia Stop: 1422    Procedure: EGD Diagnosis:       Anemia, unspecified type      Melena    Scheduled Providers: Larry Maradiaga DO; Mckinley Williamson MD Responsible Provider: Mckinley Williamson MD    Anesthesia Type: MAC ASA Status: 3            Anesthesia Type: MAC    Vitals Value Taken Time   /50 08/02/24 1422   Temp 36.5 08/02/24 1422   Pulse 87 08/02/24 1422   Resp 18 08/02/24 1422   SpO2 97 08/02/24 1422       Anesthesia Post Evaluation    Patient location during evaluation: bedside  Patient participation: complete - patient participated  Level of consciousness: awake and alert  Pain score: 0  Pain management: adequate  Airway patency: patent  Cardiovascular status: acceptable and stable  Respiratory status: acceptable and room air  Hydration status: acceptable  Postoperative Nausea and Vomiting: none      No notable events documented.

## 2024-08-02 NOTE — ANESTHESIA PREPROCEDURE EVALUATION
Patient: Cielo Peters    Procedure Information       Date/Time: 08/02/24 0815    Scheduled providers: Mckinley Williamson MD    Procedure: EGD    Location: [facility]            Relevant Problems   Anesthesia   (+) PONV (postoperative nausea and vomiting)      Cardiac   (+) CAD (coronary artery disease)   (+) Mixed hyperlipidemia      Neuro   (+) Anxiety   (+) Depression      /Renal   (+) Nephrolithiasis      Endocrine   (+) Hypothyroidism      Musculoskeletal   (+) Stenosis of lumbosacral spine      HEENT   (+) Conductive hearing loss, bilateral      ID   (+) Candidal intertrigo       Clinical information reviewed:                   NPO Detail:  No data recorded     Physical Exam    Airway  Mallampati: II  TM distance: >3 FB  Neck ROM: full     Cardiovascular    Dental    Pulmonary    Abdominal            Anesthesia Plan    History of general anesthesia?: yes  History of complications of general anesthesia?: no    ASA 3     MAC     The patient is not a current smoker.    intravenous induction   Anesthetic plan and risks discussed with patient.    Plan discussed with CRNA.

## 2024-08-03 VITALS
RESPIRATION RATE: 12 BRPM | BODY MASS INDEX: 29.49 KG/M2 | HEART RATE: 93 BPM | WEIGHT: 160.27 LBS | TEMPERATURE: 97.3 F | DIASTOLIC BLOOD PRESSURE: 51 MMHG | OXYGEN SATURATION: 100 % | SYSTOLIC BLOOD PRESSURE: 106 MMHG | HEIGHT: 62 IN

## 2024-08-03 LAB
ANION GAP SERPL CALC-SCNC: 8 MMOL/L (ref 10–20)
BASOPHILS # BLD AUTO: 0.04 X10*3/UL (ref 0–0.1)
BASOPHILS # BLD AUTO: 0.04 X10*3/UL (ref 0–0.1)
BASOPHILS NFR BLD AUTO: 0.6 %
BASOPHILS NFR BLD AUTO: 0.6 %
BUN SERPL-MCNC: 20 MG/DL (ref 6–23)
CALCIUM SERPL-MCNC: 8.8 MG/DL (ref 8.6–10.3)
CHLORIDE SERPL-SCNC: 107 MMOL/L (ref 98–107)
CO2 SERPL-SCNC: 29 MMOL/L (ref 21–32)
CREAT SERPL-MCNC: 0.54 MG/DL (ref 0.5–1.05)
EGFRCR SERPLBLD CKD-EPI 2021: >90 ML/MIN/1.73M*2
EOSINOPHIL # BLD AUTO: 0.04 X10*3/UL (ref 0–0.7)
EOSINOPHIL # BLD AUTO: 0.06 X10*3/UL (ref 0–0.7)
EOSINOPHIL NFR BLD AUTO: 0.6 %
EOSINOPHIL NFR BLD AUTO: 0.8 %
ERYTHROCYTE [DISTWIDTH] IN BLOOD BY AUTOMATED COUNT: 12.8 % (ref 11.5–14.5)
ERYTHROCYTE [DISTWIDTH] IN BLOOD BY AUTOMATED COUNT: 12.8 % (ref 11.5–14.5)
GLUCOSE SERPL-MCNC: 96 MG/DL (ref 74–99)
HCT VFR BLD AUTO: 22 % (ref 36–46)
HCT VFR BLD AUTO: 23.5 % (ref 36–46)
HGB BLD-MCNC: 7.1 G/DL (ref 12–16)
HGB BLD-MCNC: 7.5 G/DL (ref 12–16)
HOLD SPECIMEN: NORMAL
HOLD SPECIMEN: NORMAL
IMM GRANULOCYTES # BLD AUTO: 0.03 X10*3/UL (ref 0–0.7)
IMM GRANULOCYTES # BLD AUTO: 0.05 X10*3/UL (ref 0–0.7)
IMM GRANULOCYTES NFR BLD AUTO: 0.5 % (ref 0–0.9)
IMM GRANULOCYTES NFR BLD AUTO: 0.7 % (ref 0–0.9)
LYMPHOCYTES # BLD AUTO: 2.14 X10*3/UL (ref 1.2–4.8)
LYMPHOCYTES # BLD AUTO: 2.27 X10*3/UL (ref 1.2–4.8)
LYMPHOCYTES NFR BLD AUTO: 31.4 %
LYMPHOCYTES NFR BLD AUTO: 33.3 %
MAGNESIUM SERPL-MCNC: 2.02 MG/DL (ref 1.6–2.4)
MCH RBC QN AUTO: 31.7 PG (ref 26–34)
MCH RBC QN AUTO: 31.9 PG (ref 26–34)
MCHC RBC AUTO-ENTMCNC: 31.9 G/DL (ref 32–36)
MCHC RBC AUTO-ENTMCNC: 32.3 G/DL (ref 32–36)
MCV RBC AUTO: 100 FL (ref 80–100)
MCV RBC AUTO: 98 FL (ref 80–100)
MONOCYTES # BLD AUTO: 0.36 X10*3/UL (ref 0.1–1)
MONOCYTES # BLD AUTO: 0.4 X10*3/UL (ref 0.1–1)
MONOCYTES NFR BLD AUTO: 5.5 %
MONOCYTES NFR BLD AUTO: 5.6 %
NEUTROPHILS # BLD AUTO: 3.82 X10*3/UL (ref 1.2–7.7)
NEUTROPHILS # BLD AUTO: 4.41 X10*3/UL (ref 1.2–7.7)
NEUTROPHILS NFR BLD AUTO: 59.4 %
NEUTROPHILS NFR BLD AUTO: 61 %
NRBC BLD-RTO: 0 /100 WBCS (ref 0–0)
NRBC BLD-RTO: 0 /100 WBCS (ref 0–0)
PLATELET # BLD AUTO: 200 X10*3/UL (ref 150–450)
PLATELET # BLD AUTO: 211 X10*3/UL (ref 150–450)
POTASSIUM SERPL-SCNC: 3.7 MMOL/L (ref 3.5–5.3)
RBC # BLD AUTO: 2.24 X10*6/UL (ref 4–5.2)
RBC # BLD AUTO: 2.35 X10*6/UL (ref 4–5.2)
SODIUM SERPL-SCNC: 140 MMOL/L (ref 136–145)
WBC # BLD AUTO: 6.4 X10*3/UL (ref 4.4–11.3)
WBC # BLD AUTO: 7.2 X10*3/UL (ref 4.4–11.3)

## 2024-08-03 PROCEDURE — G0378 HOSPITAL OBSERVATION PER HR: HCPCS

## 2024-08-03 PROCEDURE — 85025 COMPLETE CBC W/AUTO DIFF WBC: CPT | Performed by: STUDENT IN AN ORGANIZED HEALTH CARE EDUCATION/TRAINING PROGRAM

## 2024-08-03 PROCEDURE — 2500000004 HC RX 250 GENERAL PHARMACY W/ HCPCS (ALT 636 FOR OP/ED): Performed by: STUDENT IN AN ORGANIZED HEALTH CARE EDUCATION/TRAINING PROGRAM

## 2024-08-03 PROCEDURE — 83735 ASSAY OF MAGNESIUM: CPT | Performed by: STUDENT IN AN ORGANIZED HEALTH CARE EDUCATION/TRAINING PROGRAM

## 2024-08-03 PROCEDURE — 99239 HOSP IP/OBS DSCHRG MGMT >30: CPT | Performed by: STUDENT IN AN ORGANIZED HEALTH CARE EDUCATION/TRAINING PROGRAM

## 2024-08-03 PROCEDURE — 2500000002 HC RX 250 W HCPCS SELF ADMINISTERED DRUGS (ALT 637 FOR MEDICARE OP, ALT 636 FOR OP/ED): Performed by: STUDENT IN AN ORGANIZED HEALTH CARE EDUCATION/TRAINING PROGRAM

## 2024-08-03 PROCEDURE — C9113 INJ PANTOPRAZOLE SODIUM, VIA: HCPCS | Performed by: STUDENT IN AN ORGANIZED HEALTH CARE EDUCATION/TRAINING PROGRAM

## 2024-08-03 PROCEDURE — 2500000001 HC RX 250 WO HCPCS SELF ADMINISTERED DRUGS (ALT 637 FOR MEDICARE OP): Performed by: STUDENT IN AN ORGANIZED HEALTH CARE EDUCATION/TRAINING PROGRAM

## 2024-08-03 PROCEDURE — 2500000001 HC RX 250 WO HCPCS SELF ADMINISTERED DRUGS (ALT 637 FOR MEDICARE OP): Performed by: NURSE PRACTITIONER

## 2024-08-03 PROCEDURE — 96376 TX/PRO/DX INJ SAME DRUG ADON: CPT

## 2024-08-03 PROCEDURE — 36415 COLL VENOUS BLD VENIPUNCTURE: CPT | Performed by: STUDENT IN AN ORGANIZED HEALTH CARE EDUCATION/TRAINING PROGRAM

## 2024-08-03 PROCEDURE — 80048 BASIC METABOLIC PNL TOTAL CA: CPT | Performed by: STUDENT IN AN ORGANIZED HEALTH CARE EDUCATION/TRAINING PROGRAM

## 2024-08-03 RX ORDER — SUCRALFATE 1 G/1
1 TABLET ORAL
Qty: 120 TABLET | Refills: 0 | Status: SHIPPED | OUTPATIENT
Start: 2024-08-03 | End: 2024-09-02

## 2024-08-03 RX ORDER — PANTOPRAZOLE SODIUM 40 MG/1
40 TABLET, DELAYED RELEASE ORAL 2 TIMES DAILY
Qty: 60 TABLET | Refills: 3 | Status: SHIPPED | OUTPATIENT
Start: 2024-08-03 | End: 2024-09-02

## 2024-08-03 RX ADMIN — SUCRALFATE 1 G: 1 TABLET ORAL at 06:02

## 2024-08-03 RX ADMIN — SUCRALFATE 1 G: 1 TABLET ORAL at 12:20

## 2024-08-03 RX ADMIN — LEVOTHYROXINE SODIUM 88 MCG: 0.09 TABLET ORAL at 06:02

## 2024-08-03 RX ADMIN — VENLAFAXINE HYDROCHLORIDE 150 MG: 150 CAPSULE, EXTENDED RELEASE ORAL at 09:54

## 2024-08-03 RX ADMIN — PANTOPRAZOLE SODIUM 40 MG: 40 INJECTION, POWDER, FOR SOLUTION INTRAVENOUS at 09:54

## 2024-08-03 ASSESSMENT — PAIN SCALES - WONG BAKER: WONGBAKER_NUMERICALRESPONSE: NO HURT

## 2024-08-03 ASSESSMENT — PAIN SCALES - GENERAL: PAINLEVEL_OUTOF10: 0 - NO PAIN

## 2024-08-03 NOTE — PROGRESS NOTES
08/03/24 1348   Current Planned Discharge Disposition   Current Planned Discharge Disposition Home     Plan is home, no needs.

## 2024-08-03 NOTE — DISCHARGE INSTRUCTIONS
Please get your blood checked at any  lab on 8/5 to check your hemoglobin level.    You have been provided with information for the  gastroenterology group, if they do not reach out to you in the next 2 to 3 days, please call them to establish care and set up an appointment for 2 weeks to discuss biopsy results as well as scheduling a repeat endoscopy in 3 months.

## 2024-08-03 NOTE — PROGRESS NOTES
08/03/24 1347   Discharge Planning   Living Arrangements Spouse/significant other   Support Systems Spouse/significant other   Assistance Needed none   Type of Residence Private residence   Do you have animals or pets at home? No   Who is requesting discharge planning? Provider   Home or Post Acute Services None   Expected Discharge Disposition Home   Does the patient need discharge transport arranged? No   Patient Choice   Patient / Family choosing to utilize agency / facility established prior to hospitalization No     Patient is from home, lives with spouse. Plan is home, follow up with GI in a couple months.

## 2024-08-03 NOTE — DISCHARGE SUMMARY
Medical Group Discharge Summary  DISCHARGE DIAGNOSIS     Acute symptomatic blood loss anemia  Bleeding duodenal ulcer  Hiatal hernia  Gastric ulcers  Syncope  Left breast calcification    HOSPITAL COURSE AND DETAILS     This is a 64-year-old female with a significant past medical history of generalized anxiety, hypothyroidism that presented from home after 2 syncopal events as well as multiple black bowel movements.    Was seen by GI during hospitalization.  Underwent upper endoscopy on 8/2 which showed Schatzki ring in the lower third of the esophagus, 2 cm hiatal hernia, 2 small gastric ulcers, single oozing ulcer in the duodenum requiring 1 clip/coagulation with cautery/epi injection as well.  Her hemoglobin trended down as low as 7.1 prior to increasing up to 7.5.  Patient did not require any transfusion.  She does have a baseline hemoglobin of >12-13 in 2023.    Patient is being discharged home today and will continue Protonix 40 mg twice daily as well as Carafate 1 g 4 times a day.  She has follow-up for GI in 2 weeks discussed biopsy results as well as scheduling a repeat upper endoscopy in 3 months.  Will follow-up with PCP to discuss hospitalization.    Patient aware of a left breast calcification which has been addressed previously by her outpatient team with a biopsy and some sort of chip placement.  Patient informed that she does need follow-up for her repeat yearly mammogram for the year.    Total time spent on discharge: >30min      ---Of note, this documentation is completed using the Dragon Dictation system (voice recognition software). There may be spelling and/or grammatical errors that were not corrected prior to final submission.---    José Manuel Santillan MD    DISCHARGE PHYSICAL EXAM     Last Recorded Vitals:  Vitals:    08/03/24 0033 08/03/24 0413 08/03/24 0727 08/03/24 1227   BP: 93/50 97/54 94/51 106/51   Patient Position: Lying Lying     Pulse: 88 88 89 93   Resp: 17 17 12    Temp: 36.1 °C  (97 °F) 36.2 °C (97.2 °F) 36.4 °C (97.5 °F) 36.3 °C (97.3 °F)   TempSrc: Temporal Temporal     SpO2: 96% 100% 97% 100%   Weight:       Height:         Physical Exam  Vitals reviewed.   Constitutional:       General: She is not in acute distress.     Appearance: Normal appearance. She is not toxic-appearing.   HENT:      Head: Normocephalic and atraumatic.      Nose: Nose normal.      Mouth/Throat:      Mouth: Mucous membranes are moist.   Eyes:      Extraocular Movements: Extraocular movements intact.      Conjunctiva/sclera: Conjunctivae normal.   Cardiovascular:      Rate and Rhythm: Normal rate and regular rhythm.      Pulses: Normal pulses.      Heart sounds: Normal heart sounds.   Pulmonary:      Effort: Pulmonary effort is normal. No respiratory distress.      Breath sounds: Normal breath sounds. No wheezing.   Abdominal:      General: Bowel sounds are normal. There is no distension.      Palpations: Abdomen is soft.      Tenderness: There is no abdominal tenderness. There is no guarding.   Musculoskeletal:         General: Normal range of motion.      Cervical back: Normal range of motion and neck supple.      Comments: Seen ambulating the halls on day of discharge with no gait abnormalities.   Neurological:      General: No focal deficit present.      Mental Status: She is alert and oriented to person, place, and time. Mental status is at baseline.   Psychiatric:         Mood and Affect: Mood normal.         Behavior: Behavior normal.         Thought Content: Thought content normal.           DISCHARGE MEDICATIONS        Your medication list        START taking these medications        Instructions Last Dose Given Next Dose Due   pantoprazole 40 mg EC tablet  Commonly known as: ProtoNix      Take 1 tablet (40 mg) by mouth 2 times a day. Do not crush, chew, or split.       sucralfate 1 gram tablet  Commonly known as: Carafate      Take 1 tablet (1 g) by mouth 4 times a day before meals. Mix with ~50ml of  water into a slurry and take ~1 hour before any meals.              CONTINUE taking these medications        Instructions Last Dose Given Next Dose Due   fluconazole 100 mg tablet  Commonly known as: Diflucan           levothyroxine 88 mcg tablet  Commonly known as: Synthroid, Levoxyl      TAKE 1 TABLET DAILY       metFORMIN 500 mg tablet  Commonly known as: Glucophage      TAKE 1 TABLET DAILY       Rhofade 1 % cream  Generic drug: oxymetazoline      APPLY TO THE AFFECTED AREA(S) DAILY       tiZANidine 2 mg tablet  Commonly known as: Zanaflex      TAKE 1 TABLET AT BEDTIME AS NEEDED FOR MUSCLE SPASMS       tretinoin 0.05 % cream  Commonly known as: Retin-A      Apply topically once daily at bedtime. apply to face       venlafaxine  mg 24 hr capsule  Commonly known as: Effexor-XR      TAKE 1 CAPSULE DAILY WITH FOOD                 Where to Get Your Medications        These medications were sent to [id number] - [place], [place] - [address]  [address], [place] [address]      Phone: [phone]   pantoprazole 40 mg EC tablet  sucralfate 1 gram tablet       OUTPATIENT FOLLOW-UP     Future Appointments   Date Time Provider Department Center   9/23/2024  5:15 PM Keara Montes MD [id number]

## 2024-08-04 LAB — BACTERIA UR CULT: NORMAL

## 2024-08-05 ENCOUNTER — TELEPHONE (OUTPATIENT)
Dept: PRIMARY CARE | Facility: CLINIC | Age: 64
End: 2024-08-05

## 2024-08-05 ENCOUNTER — PATIENT OUTREACH (OUTPATIENT)
Dept: PRIMARY CARE | Facility: CLINIC | Age: 64
End: 2024-08-05

## 2024-08-05 ENCOUNTER — LAB (OUTPATIENT)
Dept: LAB | Facility: LAB | Age: 64
End: 2024-08-05
Payer: COMMERCIAL

## 2024-08-05 DIAGNOSIS — R73.9 HYPERGLYCEMIA: ICD-10-CM

## 2024-08-05 DIAGNOSIS — K92.2 UPPER GI BLEED: ICD-10-CM

## 2024-08-05 DIAGNOSIS — E03.9 HYPOTHYROIDISM, UNSPECIFIED TYPE: ICD-10-CM

## 2024-08-05 DIAGNOSIS — Z87.898 HISTORY OF ABNORMAL MAMMOGRAM: ICD-10-CM

## 2024-08-05 DIAGNOSIS — R92.1 MAMMOGRAPHIC CALCIFICATION FOUND ON DIAGNOSTIC IMAGING OF BREAST: Primary | ICD-10-CM

## 2024-08-05 DIAGNOSIS — R92.1 CALCIFICATION OF BREAST: ICD-10-CM

## 2024-08-05 DIAGNOSIS — E78.2 MIXED HYPERLIPIDEMIA: ICD-10-CM

## 2024-08-05 LAB
ALBUMIN SERPL BCP-MCNC: 3.7 G/DL (ref 3.4–5)
ALP SERPL-CCNC: 66 U/L (ref 33–136)
ALT SERPL W P-5'-P-CCNC: 14 U/L (ref 7–45)
ANION GAP SERPL CALC-SCNC: 14 MMOL/L (ref 10–20)
AST SERPL W P-5'-P-CCNC: 14 U/L (ref 9–39)
BASOPHILS # BLD AUTO: 0.05 X10*3/UL (ref 0–0.1)
BASOPHILS NFR BLD AUTO: 0.9 %
BILIRUB SERPL-MCNC: 0.2 MG/DL (ref 0–1.2)
BUN SERPL-MCNC: 17 MG/DL (ref 6–23)
CALCIUM SERPL-MCNC: 9.4 MG/DL (ref 8.6–10.3)
CHLORIDE SERPL-SCNC: 105 MMOL/L (ref 98–107)
CHOLEST SERPL-MCNC: 166 MG/DL (ref 0–199)
CHOLESTEROL/HDL RATIO: 3.3
CO2 SERPL-SCNC: 26 MMOL/L (ref 21–32)
CREAT SERPL-MCNC: 0.7 MG/DL (ref 0.5–1.05)
EGFRCR SERPLBLD CKD-EPI 2021: >90 ML/MIN/1.73M*2
EOSINOPHIL # BLD AUTO: 0.17 X10*3/UL (ref 0–0.7)
EOSINOPHIL NFR BLD AUTO: 3.2 %
ERYTHROCYTE [DISTWIDTH] IN BLOOD BY AUTOMATED COUNT: 13.3 % (ref 11.5–14.5)
EST. AVERAGE GLUCOSE BLD GHB EST-MCNC: 100 MG/DL
GLUCOSE SERPL-MCNC: 96 MG/DL (ref 74–99)
HBA1C MFR BLD: 5.1 %
HCT VFR BLD AUTO: 26.4 % (ref 36–46)
HDLC SERPL-MCNC: 50.8 MG/DL
HGB BLD-MCNC: 8 G/DL (ref 12–16)
IMM GRANULOCYTES # BLD AUTO: 0.02 X10*3/UL (ref 0–0.7)
IMM GRANULOCYTES NFR BLD AUTO: 0.4 % (ref 0–0.9)
LDLC SERPL CALC-MCNC: 89 MG/DL
LYMPHOCYTES # BLD AUTO: 2.01 X10*3/UL (ref 1.2–4.8)
LYMPHOCYTES NFR BLD AUTO: 37.5 %
MCH RBC QN AUTO: 31.5 PG (ref 26–34)
MCHC RBC AUTO-ENTMCNC: 30.3 G/DL (ref 32–36)
MCV RBC AUTO: 104 FL (ref 80–100)
MONOCYTES # BLD AUTO: 0.32 X10*3/UL (ref 0.1–1)
MONOCYTES NFR BLD AUTO: 6 %
NEUTROPHILS # BLD AUTO: 2.79 X10*3/UL (ref 1.2–7.7)
NEUTROPHILS NFR BLD AUTO: 52 %
NON HDL CHOLESTEROL: 115 MG/DL (ref 0–149)
NRBC BLD-RTO: 0 /100 WBCS (ref 0–0)
PLATELET # BLD AUTO: 300 X10*3/UL (ref 150–450)
POTASSIUM SERPL-SCNC: 3.5 MMOL/L (ref 3.5–5.3)
PROT SERPL-MCNC: 6 G/DL (ref 6.4–8.2)
RBC # BLD AUTO: 2.54 X10*6/UL (ref 4–5.2)
SODIUM SERPL-SCNC: 141 MMOL/L (ref 136–145)
T4 FREE SERPL-MCNC: 0.86 NG/DL (ref 0.61–1.12)
TRIGL SERPL-MCNC: 130 MG/DL (ref 0–149)
TSH SERPL-ACNC: 4.2 MIU/L (ref 0.44–3.98)
VLDL: 26 MG/DL (ref 0–40)
WBC # BLD AUTO: 5.4 X10*3/UL (ref 4.4–11.3)

## 2024-08-05 PROCEDURE — 80053 COMPREHEN METABOLIC PANEL: CPT

## 2024-08-05 PROCEDURE — 36415 COLL VENOUS BLD VENIPUNCTURE: CPT

## 2024-08-05 PROCEDURE — 84443 ASSAY THYROID STIM HORMONE: CPT

## 2024-08-05 PROCEDURE — 84439 ASSAY OF FREE THYROXINE: CPT

## 2024-08-05 PROCEDURE — 80061 LIPID PANEL: CPT

## 2024-08-05 PROCEDURE — 83036 HEMOGLOBIN GLYCOSYLATED A1C: CPT

## 2024-08-05 PROCEDURE — 85025 COMPLETE CBC W/AUTO DIFF WBC: CPT

## 2024-08-05 NOTE — PROGRESS NOTES
Discharge Facility: [facility]  Discharge Diagnosis: Syncope and Collapse  Admission Date: 1 Aug 24  Discharge Date: 3 Aug 24    PCP Appointment Date: 12 Aug 24  Specialist Appointment Date: N/A  Hospital Encounter and Summary Linked: Yes    See discharge assessment below for further details     Engagement  Call Start Time: 1104 (8/5/2024 11:14 AM)    Medications  Medications reviewed with patient/caregiver?: Yes (8/5/2024 11:14 AM)  Is the patient having any side effects they believe may be caused by any medication additions or changes?: No (8/5/2024 11:14 AM)  Does the patient have all medications ordered at discharge?: Yes (8/5/2024 11:14 AM)  Prescription Comments: None (8/5/2024 11:14 AM)  Is the patient taking all medications as directed (includes completed medication regime)?: Yes (8/5/2024 11:14 AM)  Medication Comments: None (8/5/2024 11:14 AM)    Appointments  Does the patient have a primary care provider?: Yes (follow up made by patient for 12 Aug 24) (8/5/2024 11:14 AM)  Care Management Interventions: Verified appointment date/time/provider (8/5/2024 11:14 AM)  Has the patient kept scheduled appointments due by today?: Yes (8/5/2024 11:14 AM)  Care Management Interventions: Advised patient to keep appointment (8/5/2024 11:14 AM)    Self Management  What is the home health agency?: N/A (8/5/2024 11:14 AM)  Has home health visited the patient within 72 hours of discharge?: Not applicable (8/5/2024 11:14 AM)  What Durable Medical Equipment (DME) was ordered?: N/A (8/5/2024 11:14 AM)    Patient Teaching  Does the patient have access to their discharge instructions?: Yes (8/5/2024 11:14 AM)  Care Management Interventions: Reviewed instructions with patient (8/5/2024 11:14 AM)  What is the patient's perception of their health status since discharge?: Improving (8/5/2024 11:14 AM)  Is the patient/caregiver able to teach back the hierarchy of who to call/visit for symptoms/problems? PCP, Specialist, Home Health  nurse, Urgent Care, ED, 911: Yes (8/5/2024 11:14 AM)  Patient/Caregiver Education Comments: None (8/5/2024 11:14 AM)    Wrap Up  Wrap Up Additional Comments: None (8/5/2024 11:14 AM)  Call End Time: 1114 (8/5/2024 11:14 AM)    Pt grateful for outreach call and is agreeable to being contacted after PCP appt to see how they are doing.

## 2024-08-05 NOTE — TELEPHONE ENCOUNTER
Patient scheduled a hosp. Fup from a recent stay for bleeding ulcers.    Patient notes CT scan performed during this hospitalization revealed calcification in breast, patient is asking if you would like additional testing performed prior to her upcoming appt. On 08- or discuss further at time of appt.

## 2024-08-05 NOTE — TELEPHONE ENCOUNTER
Radiology scheduling called the office stating that patient needs orders for diagnostic bilateral w/ultrasound if needed. The ones that were in her chart were for left breast only but they need to do both.  Please place orders and notify patient.

## 2024-08-05 NOTE — TELEPHONE ENCOUNTER
Phoned patient, spouse Daniele took message, breast images are advised and orders placed contact scheduling at [phone] for appointment purposes.

## 2024-08-08 ENCOUNTER — HOSPITAL ENCOUNTER (OUTPATIENT)
Dept: RADIOLOGY | Facility: CLINIC | Age: 64
Discharge: HOME | End: 2024-08-08
Payer: COMMERCIAL

## 2024-08-08 VITALS — WEIGHT: 159 LBS | BODY MASS INDEX: 29.26 KG/M2 | HEIGHT: 62 IN

## 2024-08-08 DIAGNOSIS — R92.1 CALCIFICATION OF BREAST: ICD-10-CM

## 2024-08-08 PROCEDURE — 77066 DX MAMMO INCL CAD BI: CPT | Performed by: RADIOLOGY

## 2024-08-08 PROCEDURE — 77062 BREAST TOMOSYNTHESIS BI: CPT | Performed by: RADIOLOGY

## 2024-08-08 PROCEDURE — 77062 BREAST TOMOSYNTHESIS BI: CPT

## 2024-08-09 ENCOUNTER — TELEPHONE (OUTPATIENT)
Dept: PRIMARY CARE | Facility: CLINIC | Age: 64
End: 2024-08-09
Payer: COMMERCIAL

## 2024-08-09 DIAGNOSIS — Z87.898 HISTORY OF ABNORMAL MAMMOGRAM: ICD-10-CM

## 2024-08-09 DIAGNOSIS — R92.1 CALCIFICATION OF BREAST: ICD-10-CM

## 2024-08-12 ENCOUNTER — HOSPITAL ENCOUNTER (OUTPATIENT)
Dept: RADIOLOGY | Facility: HOSPITAL | Age: 64
Discharge: HOME | End: 2024-08-12
Payer: COMMERCIAL

## 2024-08-12 ENCOUNTER — APPOINTMENT (OUTPATIENT)
Dept: PRIMARY CARE | Facility: CLINIC | Age: 64
End: 2024-08-12
Payer: COMMERCIAL

## 2024-08-12 VITALS
WEIGHT: 169 LBS | BODY MASS INDEX: 31.1 KG/M2 | DIASTOLIC BLOOD PRESSURE: 64 MMHG | HEIGHT: 62 IN | TEMPERATURE: 97.1 F | RESPIRATION RATE: 16 BRPM | SYSTOLIC BLOOD PRESSURE: 124 MMHG | HEART RATE: 95 BPM | OXYGEN SATURATION: 97 %

## 2024-08-12 VITALS — BODY MASS INDEX: 29.44 KG/M2 | WEIGHT: 160 LBS | HEIGHT: 62 IN

## 2024-08-12 DIAGNOSIS — Z87.898 HISTORY OF ABNORMAL MAMMOGRAM: ICD-10-CM

## 2024-08-12 DIAGNOSIS — E03.9 HYPOTHYROIDISM, UNSPECIFIED TYPE: ICD-10-CM

## 2024-08-12 DIAGNOSIS — M48.07 STENOSIS OF LUMBOSACRAL SPINE: ICD-10-CM

## 2024-08-12 DIAGNOSIS — F32.A DEPRESSION, UNSPECIFIED DEPRESSION TYPE: ICD-10-CM

## 2024-08-12 DIAGNOSIS — R73.9 HYPERGLYCEMIA: ICD-10-CM

## 2024-08-12 DIAGNOSIS — F41.9 ANXIETY: ICD-10-CM

## 2024-08-12 DIAGNOSIS — M96.1 POST LAMINECTOMY SYNDROME: ICD-10-CM

## 2024-08-12 DIAGNOSIS — K26.4 BLEEDING DUODENAL ULCER: ICD-10-CM

## 2024-08-12 DIAGNOSIS — Z09 HOSPITAL DISCHARGE FOLLOW-UP: ICD-10-CM

## 2024-08-12 DIAGNOSIS — E78.2 MIXED HYPERLIPIDEMIA: ICD-10-CM

## 2024-08-12 DIAGNOSIS — D64.9 ANEMIA, UNSPECIFIED TYPE: ICD-10-CM

## 2024-08-12 DIAGNOSIS — K25.9 GASTRIC ULCER, UNSPECIFIED CHRONICITY, UNSPECIFIED WHETHER GASTRIC ULCER HEMORRHAGE OR PERFORATION PRESENT: ICD-10-CM

## 2024-08-12 DIAGNOSIS — R92.1 CALCIFICATION OF BREAST: ICD-10-CM

## 2024-08-12 PROCEDURE — 3008F BODY MASS INDEX DOCD: CPT | Performed by: FAMILY MEDICINE

## 2024-08-12 PROCEDURE — 99495 TRANSJ CARE MGMT MOD F2F 14D: CPT | Performed by: FAMILY MEDICINE

## 2024-08-12 PROCEDURE — 1036F TOBACCO NON-USER: CPT | Performed by: FAMILY MEDICINE

## 2024-08-12 PROCEDURE — 76642 ULTRASOUND BREAST LIMITED: CPT | Mod: BILATERAL PROCEDURE | Performed by: RADIOLOGY

## 2024-08-12 PROCEDURE — 76642 ULTRASOUND BREAST LIMITED: CPT | Mod: 50

## 2024-08-12 NOTE — PROGRESS NOTES
Covid vax: UTD  Flu: n/a  RSV: advised  Shingles: advised    CRC: due 2028  Mammogram: 8/2024-cat 2  Pap: hysterectomy  Lmp: hysterectomy

## 2024-08-12 NOTE — PROGRESS NOTES
Subjective   Patient ID: Cielo Peters is a 64 y.o. female who presents for Hospital Follow-up (Ulcers, anemia--discharged 8/3/24).  Significant anemia  Hba1c 5.1  Ldl 89  Hb 8  Some stressors  Offered therapy  Syncope x2  Had 2 small gastric ulcers and 1 duodenal ulcer    HPI  Patient Active Problem List   Diagnosis    Anxiety    CAD (coronary artery disease)    Candidal intertrigo    Depression    Difficulty sleeping    History of basal cell carcinoma (BCC)    History of squamous cell carcinoma    Hyperglycemia    Hypothyroidism    Mixed hyperlipidemia    Post laminectomy syndrome    Rosacea    Stenosis of lumbosacral spine    TMJ (temporomandibular joint disorder)    Overweight with body mass index (BMI) 25.0-29.9    Arthralgia    Conductive hearing loss, bilateral    Dysfunction of eustachian tube    Hematuria    Abnormal mammogram    Nephrolithiasis    Skin lesion of face    PONV (postoperative nausea and vomiting)    Syncope and collapse    Anemia       Past Surgical History:   Procedure Laterality Date    BACK SURGERY  2017    BREAST BIOPSY Left 2013    benign exc. bx in 2013 and core bx in 2022    CARDIAC CATHETERIZATION  2014    mostly (-) per pt    CARPAL TUNNEL RELEASE Right 1984    CHOLECYSTECTOMY  2013    COLONOSCOPY W/ POLYPECTOMY  10/2023    wnl-due 2028    HYSTERECTOMY  2009    tahbso no ca    LITHOTRIPSY Right 11/2020       Review of Systems  This patient has  NO history of seizures/ CAD or CVA    NO history of recent Covid nor flu symptoms,  NO Fever nor chills,  NO Chest pain, shortness of breath nor paroxysmal nocturnal dyspnea,  NO Nausea, vomiting, nor diarrhea,  NO Hematochezia nor melena,(now)  NO Dysuria, hematuria, nor new incontinence issues  NO new severe headaches nor neurological complaints,  NO new issues with anxiety nor depression nor new psychiatric complaints,  NO suicidal nor homicidal ideations.   HAD MELENA AND SYNCOPE    OBJECTIVE:  /64   Pulse 95   Temp 36.2 °C  "(97.1 °F) (Temporal)   Resp 16   Ht 1.575 m (5' 2\")   Wt 76.7 kg (169 lb)   LMP  (LMP Unknown)   SpO2 97%   BMI 30.91 kg/m²      General:  alert, oriented, no acute distress.  No obvious skin rashes noted.   No gait disturbance noted.  Pallor noted    Mood is pleasant,  no signs of emotional distress.   Not appearing intoxicated or altered.   No voiced delusions,   Normal, appropriate behavior.    HEENT: Normocephalic, atraumatic,   Pupils round, reactive to light  Extraocular motions intact and wnl  Tympanic membranes normal    Neck: no nuchal rigidity  No masses palpable.  No carotid bruits.  No thyromegaly.    Respiratory: Equal breath sounds  No wheezes,    rales,    nor rhonchi  No respiratory distress.    Heart: Regular rate and rhythm, no    murmurs  no rubs/gallops    Abdomen: no masses palpable, nontender, no rebound nor guarding.  overwt  Extremities: NO cyanosis noted, no clubbing.   No edema noted.  2+dorsalis pedis pulses.    Normal-not antalgic, steady gait.    Lab on 08/05/2024   Component Date Value Ref Range Status    WBC 08/05/2024 5.4  4.4 - 11.3 x10*3/uL Final    nRBC 08/05/2024 0.0  0.0 - 0.0 /100 WBCs Final    RBC 08/05/2024 2.54 (L)  4.00 - 5.20 x10*6/uL Final    Hemoglobin 08/05/2024 8.0 (L)  12.0 - 16.0 g/dL Final    Hematocrit 08/05/2024 26.4 (L)  36.0 - 46.0 % Final    MCV 08/05/2024 104 (H)  80 - 100 fL Final    MCH 08/05/2024 31.5  26.0 - 34.0 pg Final    MCHC 08/05/2024 30.3 (L)  32.0 - 36.0 g/dL Final    RDW 08/05/2024 13.3  11.5 - 14.5 % Final    Platelets 08/05/2024 300  150 - 450 x10*3/uL Final    Neutrophils % 08/05/2024 52.0  40.0 - 80.0 % Final    Immature Granulocytes %, Automated 08/05/2024 0.4  0.0 - 0.9 % Final    Immature Granulocyte Count (IG) includes promyelocytes, myelocytes and metamyelocytes but does not include bands. Percent differential counts (%) should be interpreted in the context of the absolute cell counts (cells/UL).    Lymphocytes % 08/05/2024 37.5  " 13.0 - 44.0 % Final    Monocytes % 08/05/2024 6.0  2.0 - 10.0 % Final    Eosinophils % 08/05/2024 3.2  0.0 - 6.0 % Final    Basophils % 08/05/2024 0.9  0.0 - 2.0 % Final    Neutrophils Absolute 08/05/2024 2.79  1.20 - 7.70 x10*3/uL Final    Percent differential counts (%) should be interpreted in the context of the absolute cell counts (cells/uL).    Immature Granulocytes Absolute, Au* 08/05/2024 0.02  0.00 - 0.70 x10*3/uL Final    Lymphocytes Absolute 08/05/2024 2.01  1.20 - 4.80 x10*3/uL Final    Monocytes Absolute 08/05/2024 0.32  0.10 - 1.00 x10*3/uL Final    Eosinophils Absolute 08/05/2024 0.17  0.00 - 0.70 x10*3/uL Final    Basophils Absolute 08/05/2024 0.05  0.00 - 0.10 x10*3/uL Final    Cholesterol 08/05/2024 166  0 - 199 mg/dL Final          Age      Desirable   Borderline High   High     0-19 Y     0 - 169       170 - 199     >/= 200    20-24 Y     0 - 189       190 - 224     >/= 225         >24 Y     0 - 199       200 - 239     >/= 240   **All ranges are based on fasting samples. Specific   therapeutic targets will vary based on patient-specific   cardiac risk.    Pediatric guidelines reference:Pediatrics 2011, 128(S5).Adult guidelines reference: NCEP ATPIII Guidelines,RADHIKA 2001, 258:2486-97    Venipuncture immediately after or during the administration of Metamizole may lead to falsely low results. Testing should be performed immediately prior to Metamizole dosing.    HDL-Cholesterol 08/05/2024 50.8  mg/dL Final      Age       Very Low   Low     Normal    High    0-19 Y    < 35      < 40     40-45     ----  20-24 Y    ----     < 40      >45      ----        >24 Y      ----     < 40     40-60      >60      Cholesterol/HDL Ratio 08/05/2024 3.3   Final      Ref Values  Desirable  < 3.4  High Risk  > 5.0    LDL Calculated 08/05/2024 89  <=99 mg/dL Final                                Near   Borderline      AGE      Desirable  Optimal    High     High     Very High     0-19 Y     0 - 109     ---    110-129    >/= 130     ----    20-24 Y     0 - 119     ---    120-159   >/= 160     ----      >24 Y     0 -  99   100-129  130-159   160-189     >/=190      VLDL 08/05/2024 26  0 - 40 mg/dL Final    Triglycerides 08/05/2024 130  0 - 149 mg/dL Final       Age         Desirable   Borderline High   High     Very High   0 D-90 D    19 - 174         ----         ----        ----  91 D- 9 Y     0 -  74        75 -  99     >/= 100      ----    10-19 Y     0 -  89        90 - 129     >/= 130      ----    20-24 Y     0 - 114       115 - 149     >/= 150      ----         >24 Y     0 - 149       150 - 199    200- 499    >/= 500    Venipuncture immediately after or during the administration of Metamizole may lead to falsely low results. Testing should be performed immediately prior to Metamizole dosing.    Non HDL Cholesterol 08/05/2024 115  0 - 149 mg/dL Final          Age       Desirable   Borderline High   High     Very High     0-19 Y     0 - 119       120 - 144     >/= 145    >/= 160    20-24 Y     0 - 149       150 - 189     >/= 190      ----         >24 Y    30 mg/dL above LDL Cholesterol goal      Glucose 08/05/2024 96  74 - 99 mg/dL Final    Sodium 08/05/2024 141  136 - 145 mmol/L Final    Potassium 08/05/2024 3.5  3.5 - 5.3 mmol/L Final    Chloride 08/05/2024 105  98 - 107 mmol/L Final    Bicarbonate 08/05/2024 26  21 - 32 mmol/L Final    Anion Gap 08/05/2024 14  10 - 20 mmol/L Final    Urea Nitrogen 08/05/2024 17  6 - 23 mg/dL Final    Creatinine 08/05/2024 0.70  0.50 - 1.05 mg/dL Final    eGFR 08/05/2024 >90  >60 mL/min/1.73m*2 Final    Calculations of estimated GFR are performed using the 2021 CKD-EPI Study Refit equation without the race variable for the IDMS-Traceable creatinine methods.  https://jasn.asnjournals.org/content/early/2021/09/22/ASN.1942610109    Calcium 08/05/2024 9.4  8.6 - 10.3 mg/dL Final    Albumin 08/05/2024 3.7  3.4 - 5.0 g/dL Final    Alkaline Phosphatase 08/05/2024 66  33 - 136 U/L Final    Total  Protein 08/05/2024 6.0 (L)  6.4 - 8.2 g/dL Final    AST 08/05/2024 14  9 - 39 U/L Final    Bilirubin, Total 08/05/2024 0.2  0.0 - 1.2 mg/dL Final    ALT 08/05/2024 14  7 - 45 U/L Final    Patients treated with Sulfasalazine may generate falsely decreased results for ALT.    Hemoglobin A1C 08/05/2024 5.1  see below % Final    Estimated Average Glucose 08/05/2024 100  Not Established mg/dL Final    Thyroid Stimulating Hormone 08/05/2024 4.20 (H)  0.44 - 3.98 mIU/L Final    Thyroxine, Free 08/05/2024 0.86  0.61 - 1.12 ng/dL Final   Admission on 08/01/2024, Discharged on 08/03/2024   Component Date Value Ref Range Status    Ventricular Rate 08/01/2024 80  BPM Final    Atrial Rate 08/01/2024 80  BPM Final    KS Interval 08/01/2024 134  ms Final    QRS Duration 08/01/2024 82  ms Final    QT Interval 08/01/2024 388  ms Final    QTC Calculation(Bazett) 08/01/2024 447  ms Final    P Axis 08/01/2024 54  degrees Final    R Axis 08/01/2024 27  degrees Final    T Axis 08/01/2024 46  degrees Final    QRS Count 08/01/2024 13  beats Final    Q Onset 08/01/2024 219  ms Final    P Onset 08/01/2024 152  ms Final    P Offset 08/01/2024 206  ms Final    T Offset 08/01/2024 413  ms Final    QTC Fredericia 08/01/2024 427  ms Final    WBC 08/01/2024 9.7  4.4 - 11.3 x10*3/uL Final    nRBC 08/01/2024 0.0  0.0 - 0.0 /100 WBCs Final    RBC 08/01/2024 3.41 (L)  4.00 - 5.20 x10*6/uL Final    Hemoglobin 08/01/2024 10.8 (L)  12.0 - 16.0 g/dL Final    Hematocrit 08/01/2024 32.7 (L)  36.0 - 46.0 % Final    MCV 08/01/2024 96  80 - 100 fL Final    MCH 08/01/2024 31.7  26.0 - 34.0 pg Final    MCHC 08/01/2024 33.0  32.0 - 36.0 g/dL Final    RDW 08/01/2024 12.1  11.5 - 14.5 % Final    Platelets 08/01/2024 260  150 - 450 x10*3/uL Final    Neutrophils % 08/01/2024 81.2  40.0 - 80.0 % Final    Immature Granulocytes %, Automated 08/01/2024 0.5  0.0 - 0.9 % Final    Immature Granulocyte Count (IG) includes promyelocytes, myelocytes and metamyelocytes but does  not include bands. Percent differential counts (%) should be interpreted in the context of the absolute cell counts (cells/UL).    Lymphocytes % 08/01/2024 15.4  13.0 - 44.0 % Final    Monocytes % 08/01/2024 2.6  2.0 - 10.0 % Final    Eosinophils % 08/01/2024 0.0  0.0 - 6.0 % Final    Basophils % 08/01/2024 0.3  0.0 - 2.0 % Final    Neutrophils Absolute 08/01/2024 7.89 (H)  1.20 - 7.70 x10*3/uL Final    Percent differential counts (%) should be interpreted in the context of the absolute cell counts (cells/uL).    Immature Granulocytes Absolute, Au* 08/01/2024 0.05  0.00 - 0.70 x10*3/uL Final    Lymphocytes Absolute 08/01/2024 1.50  1.20 - 4.80 x10*3/uL Final    Monocytes Absolute 08/01/2024 0.25  0.10 - 1.00 x10*3/uL Final    Eosinophils Absolute 08/01/2024 0.00  0.00 - 0.70 x10*3/uL Final    Basophils Absolute 08/01/2024 0.03  0.00 - 0.10 x10*3/uL Final    ABO TYPE 08/01/2024 B   Final    Rh TYPE 08/01/2024 POS   Final    ANTIBODY SCREEN 08/01/2024 NEG   Final    Protime 08/01/2024 10.9  9.8 - 12.8 seconds Final    INR 08/01/2024 1.0  0.9 - 1.1 Final    aPTT 08/01/2024 26 (L)  27 - 38 seconds Final    Magnesium 08/01/2024 1.84  1.60 - 2.40 mg/dL Final    Glucose 08/01/2024 139 (H)  74 - 99 mg/dL Final    Sodium 08/01/2024 135 (L)  136 - 145 mmol/L Final    Potassium 08/01/2024 3.8  3.5 - 5.3 mmol/L Final    Chloride 08/01/2024 103  98 - 107 mmol/L Final    Bicarbonate 08/01/2024 25  21 - 32 mmol/L Final    Anion Gap 08/01/2024 11  10 - 20 mmol/L Final    Urea Nitrogen 08/01/2024 47 (H)  6 - 23 mg/dL Final    Creatinine 08/01/2024 0.54  0.50 - 1.05 mg/dL Final    eGFR 08/01/2024 >90  >60 mL/min/1.73m*2 Final    Calculations of estimated GFR are performed using the 2021 CKD-EPI Study Refit equation without the race variable for the IDMS-Traceable creatinine methods.  https://jasn.asnjournals.org/content/early/2021/09/22/ASN.6628958825    Calcium 08/01/2024 9.3  8.6 - 10.3 mg/dL Final    Albumin 08/01/2024 4.0  3.4 -  5.0 g/dL Final    Alkaline Phosphatase 08/01/2024 76  33 - 136 U/L Final    Total Protein 08/01/2024 6.8  6.4 - 8.2 g/dL Final    AST 08/01/2024 14  9 - 39 U/L Final    Bilirubin, Total 08/01/2024 0.2  0.0 - 1.2 mg/dL Final    ALT 08/01/2024 13  7 - 45 U/L Final    Patients treated with Sulfasalazine may generate falsely decreased results for ALT.    Lipase 08/01/2024 26  9 - 82 U/L Final    Lactate 08/01/2024 1.2  0.4 - 2.0 mmol/L Final    BNP 08/01/2024 5  0 - 99 pg/mL Final    Coronavirus 2019, PCR 08/01/2024 Not Detected  Not Detected Final    Color, Urine 08/02/2024 Light-Yellow  Light-Yellow, Yellow, Dark-Yellow Final    Appearance, Urine 08/02/2024 Clear  Clear Final    Specific Gravity, Urine 08/02/2024 1.025  1.005 - 1.035 Final    pH, Urine 08/02/2024 6.0  5.0, 5.5, 6.0, 6.5, 7.0, 7.5, 8.0 Final    Protein, Urine 08/02/2024 NEGATIVE  NEGATIVE, 10 (TRACE), 20 (TRACE) mg/dL Final    Glucose, Urine 08/02/2024 Normal  Normal mg/dL Final    Blood, Urine 08/02/2024 NEGATIVE  NEGATIVE Final    Ketones, Urine 08/02/2024 NEGATIVE  NEGATIVE mg/dL Final    Bilirubin, Urine 08/02/2024 NEGATIVE  NEGATIVE Final    Urobilinogen, Urine 08/02/2024 Normal  Normal mg/dL Final    Nitrite, Urine 08/02/2024 NEGATIVE  NEGATIVE Final    Leukocyte Esterase, Urine 08/02/2024 25 Katerin/µL (A)  NEGATIVE Final    Extra Tube 08/02/2024 Hold for add-ons.   Final    Auto resulted.    Troponin I, High Sensitivity 08/01/2024 <3  0 - 13 ng/L Final    Troponin I, High Sensitivity 08/01/2024 4  0 - 13 ng/L Final    Glucose 08/02/2024 108 (H)  74 - 99 mg/dL Final    Sodium 08/02/2024 140  136 - 145 mmol/L Final    Potassium 08/02/2024 4.3  3.5 - 5.3 mmol/L Final    Chloride 08/02/2024 109 (H)  98 - 107 mmol/L Final    Bicarbonate 08/02/2024 28  21 - 32 mmol/L Final    Anion Gap 08/02/2024 7 (L)  10 - 20 mmol/L Final    Urea Nitrogen 08/02/2024 26 (H)  6 - 23 mg/dL Final    Creatinine 08/02/2024 0.54  0.50 - 1.05 mg/dL Final    eGFR 08/02/2024 >90   >60 mL/min/1.73m*2 Final    Calculations of estimated GFR are performed using the 2021 CKD-EPI Study Refit equation without the race variable for the IDMS-Traceable creatinine methods.  https://jasn.asnjournals.org/content/early/2021/09/22/ASN.2596389430    Calcium 08/02/2024 9.0  8.6 - 10.3 mg/dL Final    WBC 08/02/2024 9.6  4.4 - 11.3 x10*3/uL Final    nRBC 08/02/2024 0.0  0.0 - 0.0 /100 WBCs Final    RBC 08/02/2024 2.57 (L)  4.00 - 5.20 x10*6/uL Final    Hemoglobin 08/02/2024 8.2 (L)  12.0 - 16.0 g/dL Final    Hematocrit 08/02/2024 25.1 (L)  36.0 - 46.0 % Final    MCV 08/02/2024 98  80 - 100 fL Final    MCH 08/02/2024 31.9  26.0 - 34.0 pg Final    MCHC 08/02/2024 32.7  32.0 - 36.0 g/dL Final    RDW 08/02/2024 12.7  11.5 - 14.5 % Final    Platelets 08/02/2024 213  150 - 450 x10*3/uL Final    Neutrophils % 08/02/2024 70.1  40.0 - 80.0 % Final    Immature Granulocytes %, Automated 08/02/2024 0.5  0.0 - 0.9 % Final    Immature Granulocyte Count (IG) includes promyelocytes, myelocytes and metamyelocytes but does not include bands. Percent differential counts (%) should be interpreted in the context of the absolute cell counts (cells/UL).    Lymphocytes % 08/02/2024 23.5  13.0 - 44.0 % Final    Monocytes % 08/02/2024 5.6  2.0 - 10.0 % Final    Eosinophils % 08/02/2024 0.0  0.0 - 6.0 % Final    Basophils % 08/02/2024 0.3  0.0 - 2.0 % Final    Neutrophils Absolute 08/02/2024 6.72  1.20 - 7.70 x10*3/uL Final    Percent differential counts (%) should be interpreted in the context of the absolute cell counts (cells/uL).    Immature Granulocytes Absolute, Au* 08/02/2024 0.05  0.00 - 0.70 x10*3/uL Final    Lymphocytes Absolute 08/02/2024 2.26  1.20 - 4.80 x10*3/uL Final    Monocytes Absolute 08/02/2024 0.54  0.10 - 1.00 x10*3/uL Final    Eosinophils Absolute 08/02/2024 0.00  0.00 - 0.70 x10*3/uL Final    Basophils Absolute 08/02/2024 0.03  0.00 - 0.10 x10*3/uL Final    Magnesium 08/02/2024 2.07  1.60 - 2.40 mg/dL Final     "Extra Tube 08/02/2024 Hold for add-ons.   Final    Auto resulted.    WBC, Urine 08/02/2024 1-5  1-5, NONE /HPF Final    RBC, Urine 08/02/2024 1-2  NONE, 1-2, 3-5 /HPF Final    Squamous Epithelial Cells, Urine 08/02/2024 1-9 (SPARSE)  Reference range not established. /HPF Final    Mucus, Urine 08/02/2024 FEW  Reference range not established. /LPF Final    Urine Culture 08/02/2024 No significant growth   Final    Case Report 08/02/2024    Final                    Value:Surgical Pathology                                Case: [id number]                                  Authorizing Provider:  Larry Maradiaga DO Collected:           08/02/2024 1420              Ordering Location:     [facility] Received:            08/02/2024 1531              Pathologist:           Cain Palmer MD                                                             Specimen:    STOMACH ANTRUM BIOPSY, r/o h-pylori                                                        FINAL DIAGNOSIS 08/02/2024    Final                    Value:A.  STOMACH, BIOPSY:  - GASTRIC MUCOSA WITH HEALED EROSION/ULCER, NO HELICOBACTER IDENTIFIED.        08/02/2024    Final                    Value:By the signature on this report, the individual or group listed as making the Final Interpretation/Diagnosis certifies that they have reviewed this case.       Gross Description 08/02/2024    Final                    Value:A: Received in formalin, labeled with the patient's name and hospital number and \"st an BX\", are multiple fragments of tan, soft tissue aggregating to 0.9 x 0.3 x 0.2 cm. The specimen is submitted in toto in one cassette.  LMP      Glucose 08/03/2024 96  74 - 99 mg/dL Final    Sodium 08/03/2024 140  136 - 145 mmol/L Final    Potassium 08/03/2024 3.7  3.5 - 5.3 mmol/L Final    Chloride 08/03/2024 107  98 - 107 mmol/L Final    Bicarbonate 08/03/2024 29  21 - 32 mmol/L Final    Anion Gap 08/03/2024 8 (L)  10 - 20 mmol/L Final    Urea " Nitrogen 08/03/2024 20  6 - 23 mg/dL Final    Creatinine 08/03/2024 0.54  0.50 - 1.05 mg/dL Final    eGFR 08/03/2024 >90  >60 mL/min/1.73m*2 Final    Calculations of estimated GFR are performed using the 2021 CKD-EPI Study Refit equation without the race variable for the IDMS-Traceable creatinine methods.  https://jasn.asnjournals.org/content/early/2021/09/22/ASN.7321274224    Calcium 08/03/2024 8.8  8.6 - 10.3 mg/dL Final    WBC 08/03/2024 6.4  4.4 - 11.3 x10*3/uL Final    nRBC 08/03/2024 0.0  0.0 - 0.0 /100 WBCs Final    RBC 08/03/2024 2.24 (L)  4.00 - 5.20 x10*6/uL Final    Hemoglobin 08/03/2024 7.1 (L)  12.0 - 16.0 g/dL Final    Hematocrit 08/03/2024 22.0 (L)  36.0 - 46.0 % Final    MCV 08/03/2024 98  80 - 100 fL Final    MCH 08/03/2024 31.7  26.0 - 34.0 pg Final    MCHC 08/03/2024 32.3  32.0 - 36.0 g/dL Final    RDW 08/03/2024 12.8  11.5 - 14.5 % Final    Platelets 08/03/2024 200  150 - 450 x10*3/uL Final    Neutrophils % 08/03/2024 59.4  40.0 - 80.0 % Final    Immature Granulocytes %, Automated 08/03/2024 0.5  0.0 - 0.9 % Final    Immature Granulocyte Count (IG) includes promyelocytes, myelocytes and metamyelocytes but does not include bands. Percent differential counts (%) should be interpreted in the context of the absolute cell counts (cells/UL).    Lymphocytes % 08/03/2024 33.3  13.0 - 44.0 % Final    Monocytes % 08/03/2024 5.6  2.0 - 10.0 % Final    Eosinophils % 08/03/2024 0.6  0.0 - 6.0 % Final    Basophils % 08/03/2024 0.6  0.0 - 2.0 % Final    Neutrophils Absolute 08/03/2024 3.82  1.20 - 7.70 x10*3/uL Final    Percent differential counts (%) should be interpreted in the context of the absolute cell counts (cells/uL).    Immature Granulocytes Absolute, Au* 08/03/2024 0.03  0.00 - 0.70 x10*3/uL Final    Lymphocytes Absolute 08/03/2024 2.14  1.20 - 4.80 x10*3/uL Final    Monocytes Absolute 08/03/2024 0.36  0.10 - 1.00 x10*3/uL Final    Eosinophils Absolute 08/03/2024 0.04  0.00 - 0.70 x10*3/uL Final     Basophils Absolute 08/03/2024 0.04  0.00 - 0.10 x10*3/uL Final    Magnesium 08/03/2024 2.02  1.60 - 2.40 mg/dL Final    WBC 08/03/2024 7.2  4.4 - 11.3 x10*3/uL Final    nRBC 08/03/2024 0.0  0.0 - 0.0 /100 WBCs Final    RBC 08/03/2024 2.35 (L)  4.00 - 5.20 x10*6/uL Final    Hemoglobin 08/03/2024 7.5 (L)  12.0 - 16.0 g/dL Final    Hematocrit 08/03/2024 23.5 (L)  36.0 - 46.0 % Final    MCV 08/03/2024 100  80 - 100 fL Final    MCH 08/03/2024 31.9  26.0 - 34.0 pg Final    MCHC 08/03/2024 31.9 (L)  32.0 - 36.0 g/dL Final    RDW 08/03/2024 12.8  11.5 - 14.5 % Final    Platelets 08/03/2024 211  150 - 450 x10*3/uL Final    Neutrophils % 08/03/2024 61.0  40.0 - 80.0 % Final    Immature Granulocytes %, Automated 08/03/2024 0.7  0.0 - 0.9 % Final    Immature Granulocyte Count (IG) includes promyelocytes, myelocytes and metamyelocytes but does not include bands. Percent differential counts (%) should be interpreted in the context of the absolute cell counts (cells/UL).    Lymphocytes % 08/03/2024 31.4  13.0 - 44.0 % Final    Monocytes % 08/03/2024 5.5  2.0 - 10.0 % Final    Eosinophils % 08/03/2024 0.8  0.0 - 6.0 % Final    Basophils % 08/03/2024 0.6  0.0 - 2.0 % Final    Neutrophils Absolute 08/03/2024 4.41  1.20 - 7.70 x10*3/uL Final    Percent differential counts (%) should be interpreted in the context of the absolute cell counts (cells/uL).    Immature Granulocytes Absolute, Au* 08/03/2024 0.05  0.00 - 0.70 x10*3/uL Final    Lymphocytes Absolute 08/03/2024 2.27  1.20 - 4.80 x10*3/uL Final    Monocytes Absolute 08/03/2024 0.40  0.10 - 1.00 x10*3/uL Final    Eosinophils Absolute 08/03/2024 0.06  0.00 - 0.70 x10*3/uL Final    Basophils Absolute 08/03/2024 0.04  0.00 - 0.10 x10*3/uL Final    Extra Tube 08/03/2024 Hold for add-ons.   Final    Auto resulted.    Extra Tube 08/03/2024 Hold for add-ons.   Final    Auto resulted.        Assessment/Plan     Problem List Items Addressed This Visit       Anxiety    Relevant Orders     "CBC and Auto Differential    Ferritin    Vitamin B12    Depression    Relevant Orders    CBC and Auto Differential    Ferritin    Vitamin B12    Hyperglycemia    Relevant Orders    CBC and Auto Differential    Ferritin    Vitamin B12    Hypothyroidism    Relevant Orders    CBC and Auto Differential    Ferritin    Vitamin B12    Mixed hyperlipidemia    Relevant Orders    CBC and Auto Differential    Ferritin    Vitamin B12    Post laminectomy syndrome    Relevant Orders    CBC and Auto Differential    Ferritin    Vitamin B12    Stenosis of lumbosacral spine    Relevant Orders    CBC and Auto Differential    Ferritin    Vitamin B12    Anemia    Relevant Orders    CBC and Auto Differential    Ferritin    Vitamin B12     Other Visit Diagnoses       Hospital discharge follow-up        Relevant Orders    CBC and Auto Differential    Ferritin    Vitamin B12    Bleeding duodenal ulcer        Relevant Orders    CBC and Auto Differential    Ferritin    Vitamin B12    Gastric ulcer, unspecified chronicity, unspecified whether gastric ulcer hemorrhage or perforation present        Relevant Orders    CBC and Auto Differential    Ferritin    Vitamin B12            Follow up at next scheduled visit -as planned    Patient: Cielo Peters  : 1960  PCP: Keara Montes MD  MRN: [id number]  Program: Transitional Care Management  Status: Enrolled  Effective Dates: 2024 - present  Responsible Staff: Dexter Otero RN  Social Determinants to be Addressed: No information to display         Cielo Peters is a 64 y.o. female presenting today for follow-up after being discharged from the hospital 14 days ago. The main problem requiring admission was ulcers. The discharge summary and/or Transitional Care Management documentation was reviewed. Medication reconciliation was performed as indicated via the \"[place] as Reviewed\" timestamp.     Cielo Peters was contacted by Transitional Care Management services two " "days after her discharge. This encounter and supporting documentation was reviewed.    Review of Systems    /64   Pulse 95   Temp 36.2 °C (97.1 °F) (Temporal)   Resp 16   Ht 1.575 m (5' 2\")   Wt 76.7 kg (169 lb)   LMP  (LMP Unknown)   SpO2 97%   BMI 30.91 kg/m²     Physical Exam  See above  The complexity of medical decision making for this patient's transitional care is moderate.    Assessment/Plan   Problem List Items Addressed This Visit             ICD-10-CM    Anxiety F41.9    Relevant Orders    CBC and Auto Differential    Ferritin    Vitamin B12    Depression F32.A    Relevant Orders    CBC and Auto Differential    Ferritin    Vitamin B12    Hyperglycemia R73.9    Relevant Orders    CBC and Auto Differential    Ferritin    Vitamin B12    Hypothyroidism E03.9    Relevant Orders    CBC and Auto Differential    Ferritin    Vitamin B12    Mixed hyperlipidemia E78.2    Relevant Orders    CBC and Auto Differential    Ferritin    Vitamin B12    Post laminectomy syndrome M96.1    Relevant Orders    CBC and Auto Differential    Ferritin    Vitamin B12    Stenosis of lumbosacral spine M48.07    Relevant Orders    CBC and Auto Differential    Ferritin    Vitamin B12    Anemia D64.9    Relevant Orders    CBC and Auto Differential    Ferritin    Vitamin B12     Other Visit Diagnoses         Codes    Hospital discharge follow-up     Z09    Relevant Orders    CBC and Auto Differential    Ferritin    Vitamin B12    Bleeding duodenal ulcer     K26.4    Relevant Orders    CBC and Auto Differential    Ferritin    Vitamin B12    Gastric ulcer, unspecified chronicity, unspecified whether gastric ulcer hemorrhage or perforation present     K25.9    Relevant Orders    CBC and Auto Differential    Ferritin    Vitamin B12        No more black stool    Will avoid asa and nsaids  4-6wks labs  This medications risks, benefits, and alternatives were discussed with patient at length.  If any unwanted side effects " occur-discontinue medicine and call the office for discussion.  Stress mgmt d/w pt at length  Signs/sxs necessitating er d/w pt

## 2024-08-13 ENCOUNTER — PATIENT OUTREACH (OUTPATIENT)
Dept: PRIMARY CARE | Facility: CLINIC | Age: 64
End: 2024-08-13
Payer: COMMERCIAL

## 2024-08-13 NOTE — PROGRESS NOTES
Unable to reach patient for call back after patient's follow up appointment with PCP.   CHARISSEM with call back number for patient to call if needed   If no voicemail available call attempts x 2 were made to contact the patient to assist with any questions or concerns patient may have.

## 2024-09-03 ENCOUNTER — APPOINTMENT (OUTPATIENT)
Dept: GASTROENTEROLOGY | Facility: CLINIC | Age: 64
End: 2024-09-03
Payer: COMMERCIAL

## 2024-09-03 ENCOUNTER — PATIENT OUTREACH (OUTPATIENT)
Dept: PRIMARY CARE | Facility: CLINIC | Age: 64
End: 2024-09-03

## 2024-09-03 DIAGNOSIS — K26.9 DUODENAL ULCER: ICD-10-CM

## 2024-09-03 DIAGNOSIS — K25.0 ACUTE GASTRIC ULCER WITH HEMORRHAGE: Primary | ICD-10-CM

## 2024-09-03 PROCEDURE — 99213 OFFICE O/P EST LOW 20 MIN: CPT | Performed by: NURSE PRACTITIONER

## 2024-09-03 NOTE — PROGRESS NOTES
Assessment/Plan   Recent GIB with melena 2/2 oozing duodenal ulcer, melena resolved  2 gastric ulcers with clean base   Symptomatic anemia with syncope. Hgb 10.8-->7.1 (Baseline Hgb 13-14) 2/2 #1  Routine NSAID use - resolved.     -Repeat EGD in 3 months due October 31, 2024  -Repeat CBC, check iron and ferritin  -Follow-up with PCP in regards to restless leg syndrome complaints  -PPI 40 twice daily until repeat EGD, sent home with Rx  -Carafate 1 g p.o. 4 times daily x 30 days  -Strict avoidance of NSAIDs i.e. ibuprofen, Advil, Aleve, aspirin, Excedrin, Naprosyn, naproxen  -Follow-up with GI in 2 weeks after EGD to review results.     [facility]      Subjective     History of Present Illness:   Cielo Peters is a 64 y.o. female with a significant past medical history of hypothyroidism, depression, anxiety, hyperlipidemia and recent GIB 2/2 NSAID induced gastric and duodenal ulcer being evaluated today through virtual visit for hospital follow-up.  Patient was hospitalized August 1-3, 2024 for syncope and GIB.  Patient was evaluated by GI for reported melena and significant anemia with hemoglobin as low as 7.1 (baseline hemoglobin 13-14).  Patient underwent endoscopy on August 2 which showed 2 small gastric ulcers and a single oozing ulcer in the duodenum requiring 1 clip and coagulation with cautery and epi injection.  Patient did not receive blood transfusion in the hospital.  Patient was discharged home on Protonix 40 mg p.o. twice daily and Carafate 1 g 4 times daily.    Currently, patient denies unintentional weight loss, abdominal pain, nausea, vomiting, diarrhea or constipation.  No further episodes of melena.  Patient has stopped taking NSAIDs and is only using Tylenol if needed for pain.  Patient is compliant with PPI 40 twice daily and Carafate 1 g 4 times daily.  Patient does report significant leg cramps at night.  Patient states she has had episodes of restless leg prior to  starting pantoprazole.  Symptoms seem to be worse since hospital discharge.    EGD 8/2/2024 indicated for melena, anemia  Widely patent Schatzki ring in the distal esophagus  2 cm hiatal hernia (Hill grade III).  2 small ulcers in the antrum with clean base (Keon III) s/p H. pylori biopsies.  Duodenal ulcer with associated stricture (able to pass scope) with oozing hemorrhage (Keon IB) s/p epi injection (2 cc) + gold probe +1 clip.    STOMACH, BIOPSY:  - GASTRIC MUCOSA WITH HEALED EROSION/ULCER, NO HELICOBACTER IDENTIFIED.    Review of Systems  ROS Negative unless otherwise stated above.    Past Medical History   has a past medical history of Abnormal mammogram (08/2022), Basal cell carcinoma, and History of mammogram (08/08/2024).     Social History   reports that she has never smoked. She has never used smokeless tobacco. She reports that she does not drink alcohol and does not use drugs.     Family History  family history includes Cancer in her maternal grandmother; Kidney failure in her paternal grandmother; No Known Problems in her mother; Skin cancer in her maternal grandfather.     Allergies  Allergies   Allergen Reactions    Atorvastatin Other     Muscle & joint pain    Codeine Hallucinations       Medications  Current Outpatient Medications   Medication Instructions    fluconazole (DIFLUCAN) 100 mg, oral, Daily    levothyroxine (Synthroid, Levoxyl) 88 mcg tablet TAKE 1 TABLET DAILY    metFORMIN (GLUCOPHAGE) 500 mg, oral, Daily    oxymetazoline (Rhofade) 1 % cream APPLY TO THE AFFECTED AREA(S) DAILY    pantoprazole (PROTONIX) 40 mg, oral, 2 times daily, Do not crush, chew, or split.    tiZANidine (ZANAFLEX) 2 mg, oral, Nightly PRN    tretinoin (Retin-A) 0.05 % cream Topical, Nightly, apply to face    venlafaxine XR (EFFEXOR-XR) 150 mg, oral, Daily, take with food        Objective   Virtual visit General: A&Ox3, NAD.  HEENT: AT/NC.   Hearing good, speech good.  Engaged in conversation  Asking  appropriate questions   Skin: No Jaundice.   Neuro: No focal deficits.   Psych: Normal mood and affect.     Lab Results   Component Value Date    WBC 5.4 08/05/2024    WBC 7.2 08/03/2024    WBC 6.4 08/03/2024    HGB 8.0 (L) 08/05/2024    HGB 7.5 (L) 08/03/2024    HGB 7.1 (L) 08/03/2024    HCT 26.4 (L) 08/05/2024    HCT 23.5 (L) 08/03/2024    HCT 22.0 (L) 08/03/2024     08/05/2024     08/03/2024     08/03/2024     Lab Results   Component Value Date     08/05/2024    K 3.5 08/05/2024     08/05/2024    CO2 26 08/05/2024    BUN 17 08/05/2024    CREATININE 0.70 08/05/2024    GLUCOSE 96 08/05/2024    CALCIUM 9.4 08/05/2024       Lab Results   Component Value Date    ALKPHOS 66 08/05/2024    ALKPHOS 76 08/01/2024    ALKPHOS 84 09/28/2023    BILITOT 0.2 08/05/2024    BILITOT 0.2 08/01/2024    BILITOT 0.5 09/28/2023    PROT 6.0 (L) 08/05/2024    PROT 6.8 08/01/2024    PROT 6.5 09/28/2023    ALT 14 08/05/2024    ALT 13 08/01/2024    ALT 21 09/28/2023    AST 14 08/05/2024    AST 14 08/01/2024    AST 19 09/28/2023      Lab Results   Component Value Date    INR 1.0 08/01/2024

## 2024-09-12 ENCOUNTER — LAB (OUTPATIENT)
Dept: LAB | Facility: LAB | Age: 64
End: 2024-09-12
Payer: COMMERCIAL

## 2024-09-12 DIAGNOSIS — K25.9 GASTRIC ULCER, UNSPECIFIED CHRONICITY, UNSPECIFIED WHETHER GASTRIC ULCER HEMORRHAGE OR PERFORATION PRESENT: ICD-10-CM

## 2024-09-12 DIAGNOSIS — F41.9 ANXIETY: ICD-10-CM

## 2024-09-12 DIAGNOSIS — K26.4 BLEEDING DUODENAL ULCER: ICD-10-CM

## 2024-09-12 DIAGNOSIS — Z09 HOSPITAL DISCHARGE FOLLOW-UP: ICD-10-CM

## 2024-09-12 DIAGNOSIS — D64.9 ANEMIA, UNSPECIFIED TYPE: ICD-10-CM

## 2024-09-12 DIAGNOSIS — K25.0 ACUTE GASTRIC ULCER WITH HEMORRHAGE: ICD-10-CM

## 2024-09-12 DIAGNOSIS — R73.9 HYPERGLYCEMIA: ICD-10-CM

## 2024-09-12 DIAGNOSIS — M48.07 STENOSIS OF LUMBOSACRAL SPINE: ICD-10-CM

## 2024-09-12 DIAGNOSIS — E78.2 MIXED HYPERLIPIDEMIA: ICD-10-CM

## 2024-09-12 DIAGNOSIS — M96.1 POST LAMINECTOMY SYNDROME: ICD-10-CM

## 2024-09-12 DIAGNOSIS — F32.A DEPRESSION, UNSPECIFIED DEPRESSION TYPE: ICD-10-CM

## 2024-09-12 DIAGNOSIS — E03.9 HYPOTHYROIDISM, UNSPECIFIED TYPE: ICD-10-CM

## 2024-09-12 LAB
BASOPHILS # BLD AUTO: 0.07 X10*3/UL (ref 0–0.1)
BASOPHILS NFR BLD AUTO: 1.5 %
EOSINOPHIL # BLD AUTO: 0.12 X10*3/UL (ref 0–0.7)
EOSINOPHIL NFR BLD AUTO: 2.6 %
ERYTHROCYTE [DISTWIDTH] IN BLOOD BY AUTOMATED COUNT: 16.6 % (ref 11.5–14.5)
FERRITIN SERPL-MCNC: 10 NG/ML (ref 8–150)
HCT VFR BLD AUTO: 27.9 % (ref 36–46)
HGB BLD-MCNC: 7.9 G/DL (ref 12–16)
IMM GRANULOCYTES # BLD AUTO: 0 X10*3/UL (ref 0–0.7)
IMM GRANULOCYTES NFR BLD AUTO: 0 % (ref 0–0.9)
IRON SATN MFR SERPL: ABNORMAL %
IRON SERPL-MCNC: 16 UG/DL (ref 35–150)
LYMPHOCYTES # BLD AUTO: 1.7 X10*3/UL (ref 1.2–4.8)
LYMPHOCYTES NFR BLD AUTO: 37.4 %
MCH RBC QN AUTO: 25.2 PG (ref 26–34)
MCHC RBC AUTO-ENTMCNC: 28.3 G/DL (ref 32–36)
MCV RBC AUTO: 89 FL (ref 80–100)
MONOCYTES # BLD AUTO: 0.38 X10*3/UL (ref 0.1–1)
MONOCYTES NFR BLD AUTO: 8.4 %
NEUTROPHILS # BLD AUTO: 2.28 X10*3/UL (ref 1.2–7.7)
NEUTROPHILS NFR BLD AUTO: 50.1 %
NRBC BLD-RTO: 0 /100 WBCS (ref 0–0)
PLATELET # BLD AUTO: 389 X10*3/UL (ref 150–450)
RBC # BLD AUTO: 3.14 X10*6/UL (ref 4–5.2)
TIBC SERPL-MCNC: ABNORMAL UG/DL
UIBC SERPL-MCNC: >450 UG/DL (ref 110–370)
VIT B12 SERPL-MCNC: 341 PG/ML (ref 211–911)
WBC # BLD AUTO: 4.6 X10*3/UL (ref 4.4–11.3)

## 2024-09-12 PROCEDURE — 36415 COLL VENOUS BLD VENIPUNCTURE: CPT

## 2024-09-12 PROCEDURE — 82607 VITAMIN B-12: CPT

## 2024-09-12 PROCEDURE — 82728 ASSAY OF FERRITIN: CPT

## 2024-09-12 PROCEDURE — 85025 COMPLETE CBC W/AUTO DIFF WBC: CPT

## 2024-09-12 PROCEDURE — 83540 ASSAY OF IRON: CPT

## 2024-09-12 PROCEDURE — 83550 IRON BINDING TEST: CPT

## 2024-09-13 ENCOUNTER — DOCUMENTATION (OUTPATIENT)
Dept: INFUSION THERAPY | Facility: CLINIC | Age: 64
End: 2024-09-13
Payer: COMMERCIAL

## 2024-09-13 DIAGNOSIS — D64.9 ANEMIA, UNSPECIFIED TYPE: ICD-10-CM

## 2024-09-13 DIAGNOSIS — D64.9 ANEMIA, UNSPECIFIED TYPE: Primary | ICD-10-CM

## 2024-09-13 RX ORDER — IRON SUCROSE 20 MG/ML
200 INJECTION, SOLUTION INTRAVENOUS
Qty: 10 ML | Refills: 4 | Status: SHIPPED | OUTPATIENT
Start: 2024-09-13 | End: 2024-09-26

## 2024-09-13 RX ORDER — ALBUTEROL SULFATE 0.83 MG/ML
3 SOLUTION RESPIRATORY (INHALATION) AS NEEDED
OUTPATIENT
Start: 2024-09-13

## 2024-09-13 RX ORDER — FAMOTIDINE 10 MG/ML
20 INJECTION INTRAVENOUS ONCE AS NEEDED
OUTPATIENT
Start: 2024-09-13

## 2024-09-13 RX ORDER — EPINEPHRINE 0.3 MG/.3ML
0.3 INJECTION SUBCUTANEOUS EVERY 5 MIN PRN
OUTPATIENT
Start: 2024-09-13

## 2024-09-13 RX ORDER — DIPHENHYDRAMINE HYDROCHLORIDE 50 MG/ML
50 INJECTION INTRAMUSCULAR; INTRAVENOUS AS NEEDED
OUTPATIENT
Start: 2024-09-13

## 2024-09-13 NOTE — PROGRESS NOTES
Patient to be scheduled for venofer infusions.     For Diagnosis: Iron Deficiency Anemia    Urine Hcg test ordered prior to first infusion?  No (If female pt <60 years of age and with reproductive capability)  (If urine Hcg test ordered please instruct pt upon scheduling to drink 32 ounces of water 1 hour before arrival so bladder is full for needed urine sample)    Review of Labs:  Lab Results   Component Value Date    HGB 7.9 (L) 09/12/2024    FERRITIN 10 09/12/2024    IRON 16 (L) 09/12/2024    TIBC  09/12/2024      Comment:      One or more of the analytes used in this calculation is outside of the analytical measurement range.      MCHC 28.3 (L) 09/12/2024    MCV 89 09/12/2024    MCH 25.2 (L) 09/12/2024      Lab Results   Component Value Date    IRONSAT  09/12/2024      Comment:      One or more analytes used in this calculation is outside of the analytical measurement range. Calculation cannot be performed.        Do labs confirm diagnosis of iron deficiency? Yes    (If NO please reach out to prescribing provider prior to proceeding)      Okay to schedule for treatment as ordered by prescribing provider.

## 2024-09-23 ENCOUNTER — APPOINTMENT (OUTPATIENT)
Dept: PRIMARY CARE | Facility: CLINIC | Age: 64
End: 2024-09-23
Payer: COMMERCIAL

## 2024-09-23 ENCOUNTER — TELEPHONE (OUTPATIENT)
Dept: PRIMARY CARE | Facility: CLINIC | Age: 64
End: 2024-09-23

## 2024-09-23 VITALS
SYSTOLIC BLOOD PRESSURE: 118 MMHG | DIASTOLIC BLOOD PRESSURE: 60 MMHG | WEIGHT: 168 LBS | RESPIRATION RATE: 16 BRPM | HEIGHT: 62 IN | BODY MASS INDEX: 30.91 KG/M2 | OXYGEN SATURATION: 98 % | TEMPERATURE: 97.2 F | HEART RATE: 70 BPM

## 2024-09-23 DIAGNOSIS — F41.9 ANXIETY: ICD-10-CM

## 2024-09-23 DIAGNOSIS — Z00.00 ROUTINE GENERAL MEDICAL EXAMINATION AT A HEALTH CARE FACILITY: ICD-10-CM

## 2024-09-23 DIAGNOSIS — E78.2 MIXED HYPERLIPIDEMIA: ICD-10-CM

## 2024-09-23 DIAGNOSIS — F32.A DEPRESSION, UNSPECIFIED DEPRESSION TYPE: ICD-10-CM

## 2024-09-23 DIAGNOSIS — E03.8 OTHER SPECIFIED HYPOTHYROIDISM: ICD-10-CM

## 2024-09-23 DIAGNOSIS — K26.4 BLEEDING DUODENAL ULCER: ICD-10-CM

## 2024-09-23 DIAGNOSIS — K92.2 UPPER GI BLEED: ICD-10-CM

## 2024-09-23 DIAGNOSIS — R73.9 HYPERGLYCEMIA: ICD-10-CM

## 2024-09-23 DIAGNOSIS — D64.9 ANEMIA, UNSPECIFIED TYPE: ICD-10-CM

## 2024-09-23 PROCEDURE — 1036F TOBACCO NON-USER: CPT | Performed by: FAMILY MEDICINE

## 2024-09-23 PROCEDURE — 99396 PREV VISIT EST AGE 40-64: CPT | Performed by: FAMILY MEDICINE

## 2024-09-23 PROCEDURE — 3008F BODY MASS INDEX DOCD: CPT | Performed by: FAMILY MEDICINE

## 2024-09-23 RX ORDER — SUCRALFATE 1 G/10ML
1 SUSPENSION ORAL 2 TIMES DAILY
Qty: 600 ML | Refills: 1 | Status: SHIPPED | OUTPATIENT
Start: 2024-09-23 | End: 2024-10-23

## 2024-09-23 RX ORDER — VENLAFAXINE HYDROCHLORIDE 150 MG/1
150 CAPSULE, EXTENDED RELEASE ORAL DAILY
Qty: 90 CAPSULE | Refills: 3 | Status: SHIPPED | OUTPATIENT
Start: 2024-09-23 | End: 2024-09-23 | Stop reason: SDUPTHER

## 2024-09-23 RX ORDER — VENLAFAXINE HYDROCHLORIDE 150 MG/1
150 CAPSULE, EXTENDED RELEASE ORAL DAILY
Qty: 90 CAPSULE | Refills: 3 | Status: SHIPPED | OUTPATIENT
Start: 2024-09-23

## 2024-09-23 RX ORDER — LEVOTHYROXINE SODIUM 88 UG/1
TABLET ORAL
Qty: 90 TABLET | Refills: 3 | Status: SHIPPED | OUTPATIENT
Start: 2024-09-23 | End: 2024-09-23 | Stop reason: SDUPTHER

## 2024-09-23 RX ORDER — LEVOTHYROXINE SODIUM 88 UG/1
88 TABLET ORAL DAILY
Qty: 90 TABLET | Refills: 3 | Status: SHIPPED | OUTPATIENT
Start: 2024-09-23

## 2024-09-23 RX ORDER — PANTOPRAZOLE SODIUM 40 MG/1
40 TABLET, DELAYED RELEASE ORAL 2 TIMES DAILY
Qty: 60 TABLET | Refills: 3 | Status: SHIPPED | OUTPATIENT
Start: 2024-09-23 | End: 2024-10-23

## 2024-09-23 NOTE — TELEPHONE ENCOUNTER
Patient schedule follow up for first available which is January. Patient wants to know if she needs to be seen sooner.  ARMEN notes just say to follow up at next regular visit.

## 2024-09-23 NOTE — PROGRESS NOTES
Subjective   Patient ID: Cielo Peters is a 64 y.o. female who presents for Annual Exam, Hyperlipidemia, Hypothyroidism, Depression, Anxiety, Anemia (Follow up--scheduled for iron infusions), and Restless Legs.  Covid vax: UTD  Flu: declined today  RSV: advised  Shingles: advised     CRC: due 2028  Mammogram: 8/2024  Pap: hysterectomy  Lmp: hysterectomy  No more blood in stool  LOW IRON  Will keep thyroid at 88 for now    HPI  Patient Active Problem List   Diagnosis    Anxiety    CAD (coronary artery disease)    Candidal intertrigo    Depression    Difficulty sleeping    History of basal cell carcinoma (BCC)    History of squamous cell carcinoma    Hyperglycemia    Hypothyroidism    Mixed hyperlipidemia    Post laminectomy syndrome    Rosacea    Stenosis of lumbosacral spine    TMJ (temporomandibular joint disorder)    Overweight with body mass index (BMI) 25.0-29.9    Arthralgia    Conductive hearing loss, bilateral    Dysfunction of eustachian tube    Hematuria    Abnormal mammogram    Nephrolithiasis    Skin lesion of face    PONV (postoperative nausea and vomiting)    Syncope and collapse    Anemia       Past Surgical History:   Procedure Laterality Date    BACK SURGERY  2017    BREAST BIOPSY Left 2013    benign exc. bx in 2013 and core bx in 2022    CARDIAC CATHETERIZATION  2014    mostly (-) per pt    CARPAL TUNNEL RELEASE Right 1984    CHOLECYSTECTOMY  2013    COLONOSCOPY W/ POLYPECTOMY  10/2023    wnl-due 2028    HYSTERECTOMY  2009    tahbso no ca    LITHOTRIPSY Right 11/2020       Review of Systems  This patient has  NO history of seizures/ CAD or CVA    NO history of recent Covid nor flu symptoms,  NO Fever nor chills,  NO Chest pain, shortness of breath nor paroxysmal nocturnal dyspnea,  NO Nausea, vomiting, nor diarrhea,  NO Hematochezia nor melena,  NO Dysuria, hematuria, nor new incontinence issues  NO new severe headaches nor neurological complaints,  NO new issues with anxiety nor depression  "nor new psychiatric complaints,  NO suicidal nor homicidal ideations.     OBJECTIVE:  /60   Pulse 70   Temp 36.2 °C (97.2 °F) (Temporal)   Resp 16   Ht 1.575 m (5' 2\")   Wt 76.2 kg (168 lb)   LMP  (LMP Unknown)   SpO2 98%   BMI 30.73 kg/m²      General:  alert, oriented, no acute distress. Pallor noted  No obvious skin rashes noted.   No gait disturbance noted.    Mood is pleasant,  no signs of emotional distress.   Not appearing intoxicated or altered.   No voiced delusions,   Normal, appropriate behavior.    HEENT: Normocephalic, atraumatic,   Pupils round, reactive to light  Extraocular motions intact and wnl  Tympanic membranes normal    Neck: no nuchal rigidity  No masses palpable.  No carotid bruits.  No thyromegaly.    Respiratory: Equal breath sounds  No wheezes,    rales,    nor rhonchi  No respiratory distress.    Heart: Regular rate and rhythm, no    murmurs  no rubs/gallops    Abdomen: no masses palpable, nontender, no rebound nor guarding.    Extremities: NO cyanosis noted, no clubbing.   No edema noted.  2+dorsalis pedis pulses.    Normal-not antalgic, steady gait.    Lab on 09/12/2024   Component Date Value Ref Range Status    WBC 09/12/2024 4.6  4.4 - 11.3 x10*3/uL Final    nRBC 09/12/2024 0.0  0.0 - 0.0 /100 WBCs Final    RBC 09/12/2024 3.14 (L)  4.00 - 5.20 x10*6/uL Final    Hemoglobin 09/12/2024 7.9 (L)  12.0 - 16.0 g/dL Final    Hematocrit 09/12/2024 27.9 (L)  36.0 - 46.0 % Final    MCV 09/12/2024 89  80 - 100 fL Final    MCH 09/12/2024 25.2 (L)  26.0 - 34.0 pg Final    MCHC 09/12/2024 28.3 (L)  32.0 - 36.0 g/dL Final    RDW 09/12/2024 16.6 (H)  11.5 - 14.5 % Final    Platelets 09/12/2024 389  150 - 450 x10*3/uL Final    Neutrophils % 09/12/2024 50.1  40.0 - 80.0 % Final    Immature Granulocytes %, Automated 09/12/2024 0.0  0.0 - 0.9 % Final    Immature Granulocyte Count (IG) includes promyelocytes, myelocytes and metamyelocytes but does not include bands. Percent differential " counts (%) should be interpreted in the context of the absolute cell counts (cells/UL).    Lymphocytes % 09/12/2024 37.4  13.0 - 44.0 % Final    Monocytes % 09/12/2024 8.4  2.0 - 10.0 % Final    Eosinophils % 09/12/2024 2.6  0.0 - 6.0 % Final    Basophils % 09/12/2024 1.5  0.0 - 2.0 % Final    Neutrophils Absolute 09/12/2024 2.28  1.20 - 7.70 x10*3/uL Final    Percent differential counts (%) should be interpreted in the context of the absolute cell counts (cells/uL).    Immature Granulocytes Absolute, Au* 09/12/2024 0.00  0.00 - 0.70 x10*3/uL Final    Lymphocytes Absolute 09/12/2024 1.70  1.20 - 4.80 x10*3/uL Final    Monocytes Absolute 09/12/2024 0.38  0.10 - 1.00 x10*3/uL Final    Eosinophils Absolute 09/12/2024 0.12  0.00 - 0.70 x10*3/uL Final    Basophils Absolute 09/12/2024 0.07  0.00 - 0.10 x10*3/uL Final    Ferritin 09/12/2024 10  8 - 150 ng/mL Final    Vitamin B12 09/12/2024 341  211 - 911 pg/mL Final    Iron 09/12/2024 16 (L)  35 - 150 ug/dL Final    UIBC 09/12/2024 >450 (H)  110 - 370 ug/dL Final    TIBC 09/12/2024    Final    One or more of the analytes used in this calculation is outside of the analytical measurement range.      % Saturation 09/12/2024    Final    One or more analytes used in this calculation is outside of the analytical measurement range. Calculation cannot be performed.   Lab on 08/05/2024   Component Date Value Ref Range Status    WBC 08/05/2024 5.4  4.4 - 11.3 x10*3/uL Final    nRBC 08/05/2024 0.0  0.0 - 0.0 /100 WBCs Final    RBC 08/05/2024 2.54 (L)  4.00 - 5.20 x10*6/uL Final    Hemoglobin 08/05/2024 8.0 (L)  12.0 - 16.0 g/dL Final    Hematocrit 08/05/2024 26.4 (L)  36.0 - 46.0 % Final    MCV 08/05/2024 104 (H)  80 - 100 fL Final    MCH 08/05/2024 31.5  26.0 - 34.0 pg Final    MCHC 08/05/2024 30.3 (L)  32.0 - 36.0 g/dL Final    RDW 08/05/2024 13.3  11.5 - 14.5 % Final    Platelets 08/05/2024 300  150 - 450 x10*3/uL Final    Neutrophils % 08/05/2024 52.0  40.0 - 80.0 % Final     Immature Granulocytes %, Automated 08/05/2024 0.4  0.0 - 0.9 % Final    Immature Granulocyte Count (IG) includes promyelocytes, myelocytes and metamyelocytes but does not include bands. Percent differential counts (%) should be interpreted in the context of the absolute cell counts (cells/UL).    Lymphocytes % 08/05/2024 37.5  13.0 - 44.0 % Final    Monocytes % 08/05/2024 6.0  2.0 - 10.0 % Final    Eosinophils % 08/05/2024 3.2  0.0 - 6.0 % Final    Basophils % 08/05/2024 0.9  0.0 - 2.0 % Final    Neutrophils Absolute 08/05/2024 2.79  1.20 - 7.70 x10*3/uL Final    Percent differential counts (%) should be interpreted in the context of the absolute cell counts (cells/uL).    Immature Granulocytes Absolute, Au* 08/05/2024 0.02  0.00 - 0.70 x10*3/uL Final    Lymphocytes Absolute 08/05/2024 2.01  1.20 - 4.80 x10*3/uL Final    Monocytes Absolute 08/05/2024 0.32  0.10 - 1.00 x10*3/uL Final    Eosinophils Absolute 08/05/2024 0.17  0.00 - 0.70 x10*3/uL Final    Basophils Absolute 08/05/2024 0.05  0.00 - 0.10 x10*3/uL Final    Cholesterol 08/05/2024 166  0 - 199 mg/dL Final          Age      Desirable   Borderline High   High     0-19 Y     0 - 169       170 - 199     >/= 200    20-24 Y     0 - 189       190 - 224     >/= 225         >24 Y     0 - 199       200 - 239     >/= 240   **All ranges are based on fasting samples. Specific   therapeutic targets will vary based on patient-specific   cardiac risk.    Pediatric guidelines reference:Pediatrics 2011, 128(S5).Adult guidelines reference: NCEP ATPIII Guidelines,RADHIKA 2001, 258:2486-97    Venipuncture immediately after or during the administration of Metamizole may lead to falsely low results. Testing should be performed immediately prior to Metamizole dosing.    HDL-Cholesterol 08/05/2024 50.8  mg/dL Final      Age       Very Low   Low     Normal    High    0-19 Y    < 35      < 40     40-45     ----  20-24 Y    ----     < 40      >45      ----        >24 Y      ----     < 40      40-60      >60      Cholesterol/HDL Ratio 08/05/2024 3.3   Final      Ref Values  Desirable  < 3.4  High Risk  > 5.0    LDL Calculated 08/05/2024 89  <=99 mg/dL Final                                Near   Borderline      AGE      Desirable  Optimal    High     High     Very High     0-19 Y     0 - 109     ---    110-129   >/= 130     ----    20-24 Y     0 - 119     ---    120-159   >/= 160     ----      >24 Y     0 -  99   100-129  130-159   160-189     >/=190      VLDL 08/05/2024 26  0 - 40 mg/dL Final    Triglycerides 08/05/2024 130  0 - 149 mg/dL Final       Age         Desirable   Borderline High   High     Very High   0 D-90 D    19 - 174         ----         ----        ----  91 D- 9 Y     0 -  74        75 -  99     >/= 100      ----    10-19 Y     0 -  89        90 - 129     >/= 130      ----    20-24 Y     0 - 114       115 - 149     >/= 150      ----         >24 Y     0 - 149       150 - 199    200- 499    >/= 500    Venipuncture immediately after or during the administration of Metamizole may lead to falsely low results. Testing should be performed immediately prior to Metamizole dosing.    Non HDL Cholesterol 08/05/2024 115  0 - 149 mg/dL Final          Age       Desirable   Borderline High   High     Very High     0-19 Y     0 - 119       120 - 144     >/= 145    >/= 160    20-24 Y     0 - 149       150 - 189     >/= 190      ----         >24 Y    30 mg/dL above LDL Cholesterol goal      Glucose 08/05/2024 96  74 - 99 mg/dL Final    Sodium 08/05/2024 141  136 - 145 mmol/L Final    Potassium 08/05/2024 3.5  3.5 - 5.3 mmol/L Final    Chloride 08/05/2024 105  98 - 107 mmol/L Final    Bicarbonate 08/05/2024 26  21 - 32 mmol/L Final    Anion Gap 08/05/2024 14  10 - 20 mmol/L Final    Urea Nitrogen 08/05/2024 17  6 - 23 mg/dL Final    Creatinine 08/05/2024 0.70  0.50 - 1.05 mg/dL Final    eGFR 08/05/2024 >90  >60 mL/min/1.73m*2 Final    Calculations of estimated GFR are performed using the 2021 CKD-EPI  Study Refit equation without the race variable for the IDMS-Traceable creatinine methods.  https://jasn.asnjournals.org/content/early/2021/09/22/ASN.1294206100    Calcium 08/05/2024 9.4  8.6 - 10.3 mg/dL Final    Albumin 08/05/2024 3.7  3.4 - 5.0 g/dL Final    Alkaline Phosphatase 08/05/2024 66  33 - 136 U/L Final    Total Protein 08/05/2024 6.0 (L)  6.4 - 8.2 g/dL Final    AST 08/05/2024 14  9 - 39 U/L Final    Bilirubin, Total 08/05/2024 0.2  0.0 - 1.2 mg/dL Final    ALT 08/05/2024 14  7 - 45 U/L Final    Patients treated with Sulfasalazine may generate falsely decreased results for ALT.    Hemoglobin A1C 08/05/2024 5.1  see below % Final    Estimated Average Glucose 08/05/2024 100  Not Established mg/dL Final    Thyroid Stimulating Hormone 08/05/2024 4.20 (H)  0.44 - 3.98 mIU/L Final    Thyroxine, Free 08/05/2024 0.86  0.61 - 1.12 ng/dL Final   Admission on 08/01/2024, Discharged on 08/03/2024   Component Date Value Ref Range Status    Ventricular Rate 08/01/2024 80  BPM Final    Atrial Rate 08/01/2024 80  BPM Final    CA Interval 08/01/2024 134  ms Final    QRS Duration 08/01/2024 82  ms Final    QT Interval 08/01/2024 388  ms Final    QTC Calculation(Bazett) 08/01/2024 447  ms Final    P Axis 08/01/2024 54  degrees Final    R Axis 08/01/2024 27  degrees Final    T Axis 08/01/2024 46  degrees Final    QRS Count 08/01/2024 13  beats Final    Q Onset 08/01/2024 219  ms Final    P Onset 08/01/2024 152  ms Final    P Offset 08/01/2024 206  ms Final    T Offset 08/01/2024 413  ms Final    QTC Fredericia 08/01/2024 427  ms Final    WBC 08/01/2024 9.7  4.4 - 11.3 x10*3/uL Final    nRBC 08/01/2024 0.0  0.0 - 0.0 /100 WBCs Final    RBC 08/01/2024 3.41 (L)  4.00 - 5.20 x10*6/uL Final    Hemoglobin 08/01/2024 10.8 (L)  12.0 - 16.0 g/dL Final    Hematocrit 08/01/2024 32.7 (L)  36.0 - 46.0 % Final    MCV 08/01/2024 96  80 - 100 fL Final    MCH 08/01/2024 31.7  26.0 - 34.0 pg Final    MCHC 08/01/2024 33.0  32.0 - 36.0 g/dL  Final    RDW 08/01/2024 12.1  11.5 - 14.5 % Final    Platelets 08/01/2024 260  150 - 450 x10*3/uL Final    Neutrophils % 08/01/2024 81.2  40.0 - 80.0 % Final    Immature Granulocytes %, Automated 08/01/2024 0.5  0.0 - 0.9 % Final    Immature Granulocyte Count (IG) includes promyelocytes, myelocytes and metamyelocytes but does not include bands. Percent differential counts (%) should be interpreted in the context of the absolute cell counts (cells/UL).    Lymphocytes % 08/01/2024 15.4  13.0 - 44.0 % Final    Monocytes % 08/01/2024 2.6  2.0 - 10.0 % Final    Eosinophils % 08/01/2024 0.0  0.0 - 6.0 % Final    Basophils % 08/01/2024 0.3  0.0 - 2.0 % Final    Neutrophils Absolute 08/01/2024 7.89 (H)  1.20 - 7.70 x10*3/uL Final    Percent differential counts (%) should be interpreted in the context of the absolute cell counts (cells/uL).    Immature Granulocytes Absolute, Au* 08/01/2024 0.05  0.00 - 0.70 x10*3/uL Final    Lymphocytes Absolute 08/01/2024 1.50  1.20 - 4.80 x10*3/uL Final    Monocytes Absolute 08/01/2024 0.25  0.10 - 1.00 x10*3/uL Final    Eosinophils Absolute 08/01/2024 0.00  0.00 - 0.70 x10*3/uL Final    Basophils Absolute 08/01/2024 0.03  0.00 - 0.10 x10*3/uL Final    ABO TYPE 08/01/2024 B   Final    Rh TYPE 08/01/2024 POS   Final    ANTIBODY SCREEN 08/01/2024 NEG   Final    Protime 08/01/2024 10.9  9.8 - 12.8 seconds Final    INR 08/01/2024 1.0  0.9 - 1.1 Final    aPTT 08/01/2024 26 (L)  27 - 38 seconds Final    Magnesium 08/01/2024 1.84  1.60 - 2.40 mg/dL Final    Glucose 08/01/2024 139 (H)  74 - 99 mg/dL Final    Sodium 08/01/2024 135 (L)  136 - 145 mmol/L Final    Potassium 08/01/2024 3.8  3.5 - 5.3 mmol/L Final    Chloride 08/01/2024 103  98 - 107 mmol/L Final    Bicarbonate 08/01/2024 25  21 - 32 mmol/L Final    Anion Gap 08/01/2024 11  10 - 20 mmol/L Final    Urea Nitrogen 08/01/2024 47 (H)  6 - 23 mg/dL Final    Creatinine 08/01/2024 0.54  0.50 - 1.05 mg/dL Final    eGFR 08/01/2024 >90  >60  mL/min/1.73m*2 Final    Calculations of estimated GFR are performed using the 2021 CKD-EPI Study Refit equation without the race variable for the IDMS-Traceable creatinine methods.  https://jasn.asnjournals.org/content/early/2021/09/22/ASN.3455491012    Calcium 08/01/2024 9.3  8.6 - 10.3 mg/dL Final    Albumin 08/01/2024 4.0  3.4 - 5.0 g/dL Final    Alkaline Phosphatase 08/01/2024 76  33 - 136 U/L Final    Total Protein 08/01/2024 6.8  6.4 - 8.2 g/dL Final    AST 08/01/2024 14  9 - 39 U/L Final    Bilirubin, Total 08/01/2024 0.2  0.0 - 1.2 mg/dL Final    ALT 08/01/2024 13  7 - 45 U/L Final    Patients treated with Sulfasalazine may generate falsely decreased results for ALT.    Lipase 08/01/2024 26  9 - 82 U/L Final    Lactate 08/01/2024 1.2  0.4 - 2.0 mmol/L Final    BNP 08/01/2024 5  0 - 99 pg/mL Final    Coronavirus 2019, PCR 08/01/2024 Not Detected  Not Detected Final    Color, Urine 08/02/2024 Light-Yellow  Light-Yellow, Yellow, Dark-Yellow Final    Appearance, Urine 08/02/2024 Clear  Clear Final    Specific Gravity, Urine 08/02/2024 1.025  1.005 - 1.035 Final    pH, Urine 08/02/2024 6.0  5.0, 5.5, 6.0, 6.5, 7.0, 7.5, 8.0 Final    Protein, Urine 08/02/2024 NEGATIVE  NEGATIVE, 10 (TRACE), 20 (TRACE) mg/dL Final    Glucose, Urine 08/02/2024 Normal  Normal mg/dL Final    Blood, Urine 08/02/2024 NEGATIVE  NEGATIVE Final    Ketones, Urine 08/02/2024 NEGATIVE  NEGATIVE mg/dL Final    Bilirubin, Urine 08/02/2024 NEGATIVE  NEGATIVE Final    Urobilinogen, Urine 08/02/2024 Normal  Normal mg/dL Final    Nitrite, Urine 08/02/2024 NEGATIVE  NEGATIVE Final    Leukocyte Esterase, Urine 08/02/2024 25 Katerin/µL (A)  NEGATIVE Final    Extra Tube 08/02/2024 Hold for add-ons.   Final    Auto resulted.    Troponin I, High Sensitivity 08/01/2024 <3  0 - 13 ng/L Final    Troponin I, High Sensitivity 08/01/2024 4  0 - 13 ng/L Final    Glucose 08/02/2024 108 (H)  74 - 99 mg/dL Final    Sodium 08/02/2024 140  136 - 145 mmol/L Final     Potassium 08/02/2024 4.3  3.5 - 5.3 mmol/L Final    Chloride 08/02/2024 109 (H)  98 - 107 mmol/L Final    Bicarbonate 08/02/2024 28  21 - 32 mmol/L Final    Anion Gap 08/02/2024 7 (L)  10 - 20 mmol/L Final    Urea Nitrogen 08/02/2024 26 (H)  6 - 23 mg/dL Final    Creatinine 08/02/2024 0.54  0.50 - 1.05 mg/dL Final    eGFR 08/02/2024 >90  >60 mL/min/1.73m*2 Final    Calculations of estimated GFR are performed using the 2021 CKD-EPI Study Refit equation without the race variable for the IDMS-Traceable creatinine methods.  https://jasn.asnjournals.org/content/early/2021/09/22/ASN.1668675131    Calcium 08/02/2024 9.0  8.6 - 10.3 mg/dL Final    WBC 08/02/2024 9.6  4.4 - 11.3 x10*3/uL Final    nRBC 08/02/2024 0.0  0.0 - 0.0 /100 WBCs Final    RBC 08/02/2024 2.57 (L)  4.00 - 5.20 x10*6/uL Final    Hemoglobin 08/02/2024 8.2 (L)  12.0 - 16.0 g/dL Final    Hematocrit 08/02/2024 25.1 (L)  36.0 - 46.0 % Final    MCV 08/02/2024 98  80 - 100 fL Final    MCH 08/02/2024 31.9  26.0 - 34.0 pg Final    MCHC 08/02/2024 32.7  32.0 - 36.0 g/dL Final    RDW 08/02/2024 12.7  11.5 - 14.5 % Final    Platelets 08/02/2024 213  150 - 450 x10*3/uL Final    Neutrophils % 08/02/2024 70.1  40.0 - 80.0 % Final    Immature Granulocytes %, Automated 08/02/2024 0.5  0.0 - 0.9 % Final    Immature Granulocyte Count (IG) includes promyelocytes, myelocytes and metamyelocytes but does not include bands. Percent differential counts (%) should be interpreted in the context of the absolute cell counts (cells/UL).    Lymphocytes % 08/02/2024 23.5  13.0 - 44.0 % Final    Monocytes % 08/02/2024 5.6  2.0 - 10.0 % Final    Eosinophils % 08/02/2024 0.0  0.0 - 6.0 % Final    Basophils % 08/02/2024 0.3  0.0 - 2.0 % Final    Neutrophils Absolute 08/02/2024 6.72  1.20 - 7.70 x10*3/uL Final    Percent differential counts (%) should be interpreted in the context of the absolute cell counts (cells/uL).    Immature Granulocytes Absolute, Au* 08/02/2024 0.05  0.00 - 0.70  "x10*3/uL Final    Lymphocytes Absolute 08/02/2024 2.26  1.20 - 4.80 x10*3/uL Final    Monocytes Absolute 08/02/2024 0.54  0.10 - 1.00 x10*3/uL Final    Eosinophils Absolute 08/02/2024 0.00  0.00 - 0.70 x10*3/uL Final    Basophils Absolute 08/02/2024 0.03  0.00 - 0.10 x10*3/uL Final    Magnesium 08/02/2024 2.07  1.60 - 2.40 mg/dL Final    Extra Tube 08/02/2024 Hold for add-ons.   Final    Auto resulted.    WBC, Urine 08/02/2024 1-5  1-5, NONE /HPF Final    RBC, Urine 08/02/2024 1-2  NONE, 1-2, 3-5 /HPF Final    Squamous Epithelial Cells, Urine 08/02/2024 1-9 (SPARSE)  Reference range not established. /HPF Final    Mucus, Urine 08/02/2024 FEW  Reference range not established. /LPF Final    Urine Culture 08/02/2024 No significant growth   Final    Case Report 08/02/2024    Final                    Value:Surgical Pathology                                Case: [id number]                                  Authorizing Provider:  Larry Maradiaga DO Collected:           08/02/2024 1420              Ordering Location:     [facility] 9 Received:            08/02/2024 1531              Pathologist:           Cain Palmer MD                                                             Specimen:    STOMACH ANTRUM BIOPSY, r/o h-pylori                                                        FINAL DIAGNOSIS 08/02/2024    Final                    Value:A.  STOMACH, BIOPSY:  - GASTRIC MUCOSA WITH HEALED EROSION/ULCER, NO HELICOBACTER IDENTIFIED.        08/02/2024    Final                    Value:By the signature on this report, the individual or group listed as making the Final Interpretation/Diagnosis certifies that they have reviewed this case.       Gross Description 08/02/2024    Final                    Value:A: Received in formalin, labeled with the patient's name and hospital number and \"st an BX\", are multiple fragments of tan, soft tissue aggregating to 0.9 x 0.3 x 0.2 cm. The specimen is submitted in " toto in one cassette.  LMP      Glucose 08/03/2024 96  74 - 99 mg/dL Final    Sodium 08/03/2024 140  136 - 145 mmol/L Final    Potassium 08/03/2024 3.7  3.5 - 5.3 mmol/L Final    Chloride 08/03/2024 107  98 - 107 mmol/L Final    Bicarbonate 08/03/2024 29  21 - 32 mmol/L Final    Anion Gap 08/03/2024 8 (L)  10 - 20 mmol/L Final    Urea Nitrogen 08/03/2024 20  6 - 23 mg/dL Final    Creatinine 08/03/2024 0.54  0.50 - 1.05 mg/dL Final    eGFR 08/03/2024 >90  >60 mL/min/1.73m*2 Final    Calculations of estimated GFR are performed using the 2021 CKD-EPI Study Refit equation without the race variable for the IDMS-Traceable creatinine methods.  https://jasn.asnjournals.org/content/early/2021/09/22/ASN.1629031291    Calcium 08/03/2024 8.8  8.6 - 10.3 mg/dL Final    WBC 08/03/2024 6.4  4.4 - 11.3 x10*3/uL Final    nRBC 08/03/2024 0.0  0.0 - 0.0 /100 WBCs Final    RBC 08/03/2024 2.24 (L)  4.00 - 5.20 x10*6/uL Final    Hemoglobin 08/03/2024 7.1 (L)  12.0 - 16.0 g/dL Final    Hematocrit 08/03/2024 22.0 (L)  36.0 - 46.0 % Final    MCV 08/03/2024 98  80 - 100 fL Final    MCH 08/03/2024 31.7  26.0 - 34.0 pg Final    MCHC 08/03/2024 32.3  32.0 - 36.0 g/dL Final    RDW 08/03/2024 12.8  11.5 - 14.5 % Final    Platelets 08/03/2024 200  150 - 450 x10*3/uL Final    Neutrophils % 08/03/2024 59.4  40.0 - 80.0 % Final    Immature Granulocytes %, Automated 08/03/2024 0.5  0.0 - 0.9 % Final    Immature Granulocyte Count (IG) includes promyelocytes, myelocytes and metamyelocytes but does not include bands. Percent differential counts (%) should be interpreted in the context of the absolute cell counts (cells/UL).    Lymphocytes % 08/03/2024 33.3  13.0 - 44.0 % Final    Monocytes % 08/03/2024 5.6  2.0 - 10.0 % Final    Eosinophils % 08/03/2024 0.6  0.0 - 6.0 % Final    Basophils % 08/03/2024 0.6  0.0 - 2.0 % Final    Neutrophils Absolute 08/03/2024 3.82  1.20 - 7.70 x10*3/uL Final    Percent differential counts (%) should be interpreted in the  context of the absolute cell counts (cells/uL).    Immature Granulocytes Absolute, Au* 08/03/2024 0.03  0.00 - 0.70 x10*3/uL Final    Lymphocytes Absolute 08/03/2024 2.14  1.20 - 4.80 x10*3/uL Final    Monocytes Absolute 08/03/2024 0.36  0.10 - 1.00 x10*3/uL Final    Eosinophils Absolute 08/03/2024 0.04  0.00 - 0.70 x10*3/uL Final    Basophils Absolute 08/03/2024 0.04  0.00 - 0.10 x10*3/uL Final    Magnesium 08/03/2024 2.02  1.60 - 2.40 mg/dL Final    WBC 08/03/2024 7.2  4.4 - 11.3 x10*3/uL Final    nRBC 08/03/2024 0.0  0.0 - 0.0 /100 WBCs Final    RBC 08/03/2024 2.35 (L)  4.00 - 5.20 x10*6/uL Final    Hemoglobin 08/03/2024 7.5 (L)  12.0 - 16.0 g/dL Final    Hematocrit 08/03/2024 23.5 (L)  36.0 - 46.0 % Final    MCV 08/03/2024 100  80 - 100 fL Final    MCH 08/03/2024 31.9  26.0 - 34.0 pg Final    MCHC 08/03/2024 31.9 (L)  32.0 - 36.0 g/dL Final    RDW 08/03/2024 12.8  11.5 - 14.5 % Final    Platelets 08/03/2024 211  150 - 450 x10*3/uL Final    Neutrophils % 08/03/2024 61.0  40.0 - 80.0 % Final    Immature Granulocytes %, Automated 08/03/2024 0.7  0.0 - 0.9 % Final    Immature Granulocyte Count (IG) includes promyelocytes, myelocytes and metamyelocytes but does not include bands. Percent differential counts (%) should be interpreted in the context of the absolute cell counts (cells/UL).    Lymphocytes % 08/03/2024 31.4  13.0 - 44.0 % Final    Monocytes % 08/03/2024 5.5  2.0 - 10.0 % Final    Eosinophils % 08/03/2024 0.8  0.0 - 6.0 % Final    Basophils % 08/03/2024 0.6  0.0 - 2.0 % Final    Neutrophils Absolute 08/03/2024 4.41  1.20 - 7.70 x10*3/uL Final    Percent differential counts (%) should be interpreted in the context of the absolute cell counts (cells/uL).    Immature Granulocytes Absolute, Au* 08/03/2024 0.05  0.00 - 0.70 x10*3/uL Final    Lymphocytes Absolute 08/03/2024 2.27  1.20 - 4.80 x10*3/uL Final    Monocytes Absolute 08/03/2024 0.40  0.10 - 1.00 x10*3/uL Final    Eosinophils Absolute 08/03/2024 0.06   0.00 - 0.70 x10*3/uL Final    Basophils Absolute 08/03/2024 0.04  0.00 - 0.10 x10*3/uL Final    Extra Tube 08/03/2024 Hold for add-ons.   Final    Auto resulted.    Extra Tube 08/03/2024 Hold for add-ons.   Final    Auto resulted.        Assessment/Plan     Problem List Items Addressed This Visit       Anxiety    Relevant Medications    venlafaxine XR (Effexor-XR) 150 mg 24 hr capsule    Other Relevant Orders    CBC and Auto Differential    Comprehensive Metabolic Panel    Thyroid Stimulating Hormone    Thyroxine, Free    Depression    Relevant Medications    venlafaxine XR (Effexor-XR) 150 mg 24 hr capsule    Other Relevant Orders    CBC and Auto Differential    Comprehensive Metabolic Panel    Thyroid Stimulating Hormone    Thyroxine, Free    Hyperglycemia    Relevant Orders    CBC and Auto Differential    Comprehensive Metabolic Panel    Thyroid Stimulating Hormone    Thyroxine, Free    Hypothyroidism    Relevant Medications    levothyroxine (Synthroid, Levoxyl) 88 mcg tablet    Other Relevant Orders    CBC and Auto Differential    Comprehensive Metabolic Panel    Thyroid Stimulating Hormone    Thyroxine, Free    Mixed hyperlipidemia    Relevant Orders    CBC and Auto Differential    Comprehensive Metabolic Panel    Thyroid Stimulating Hormone    Thyroxine, Free    Anemia    Relevant Orders    CBC and Auto Differential    Comprehensive Metabolic Panel    Thyroid Stimulating Hormone    Thyroxine, Free     Other Visit Diagnoses       Routine general medical examination at a health care facility        Relevant Orders    CBC and Auto Differential    Comprehensive Metabolic Panel    Thyroid Stimulating Hormone    Thyroxine, Free    Bleeding duodenal ulcer        Relevant Medications    sucralfate (Carafate) 100 mg/mL suspension    Other Relevant Orders    CBC and Auto Differential    Comprehensive Metabolic Panel    Thyroid Stimulating Hormone    Thyroxine, Free            Follow up at next scheduled visit -as  planned    Continue protonix and carafate  Labs in 2mo  The patient is aware that results will be forthcoming of ALL planned labs and or tests. If no results are received on my chart or by letter within 1 - 3 weeks, the patient is aware they need to call to obtain results, as this is not usual. Also, if any new conditions arise, or current condition worsens, it is understood that sooner appointment should be made or urgent care/convenient care or emergency room treatment should be sought depending on severity. Otherwise follow up for recheck at regular intervals as we have discussed, at least yearly.    Scope planned  No further dark stools  Feels itchy often

## 2024-09-23 NOTE — PROGRESS NOTES
Covid vax: UTD  Flu: declined today  RSV: advised  Shingles: advised    CRC: due 2028  Mammogram: 8/2024  Pap: hysterectomy  Lmp: hysterectomy

## 2024-09-25 ENCOUNTER — APPOINTMENT (OUTPATIENT)
Dept: INFUSION THERAPY | Facility: CLINIC | Age: 64
End: 2024-09-25
Payer: COMMERCIAL

## 2024-09-25 VITALS
RESPIRATION RATE: 18 BRPM | TEMPERATURE: 97.5 F | HEART RATE: 72 BPM | DIASTOLIC BLOOD PRESSURE: 67 MMHG | SYSTOLIC BLOOD PRESSURE: 95 MMHG | OXYGEN SATURATION: 97 %

## 2024-09-25 DIAGNOSIS — D64.9 ANEMIA, UNSPECIFIED TYPE: ICD-10-CM

## 2024-09-25 PROCEDURE — 96365 THER/PROPH/DIAG IV INF INIT: CPT | Performed by: REGISTERED NURSE

## 2024-09-25 RX ORDER — EPINEPHRINE 0.3 MG/.3ML
0.3 INJECTION SUBCUTANEOUS EVERY 5 MIN PRN
OUTPATIENT
Start: 2024-09-28

## 2024-09-25 RX ORDER — ALBUTEROL SULFATE 0.83 MG/ML
3 SOLUTION RESPIRATORY (INHALATION) AS NEEDED
OUTPATIENT
Start: 2024-09-28

## 2024-09-25 RX ORDER — FAMOTIDINE 10 MG/ML
20 INJECTION INTRAVENOUS ONCE AS NEEDED
OUTPATIENT
Start: 2024-09-28

## 2024-09-25 RX ORDER — DIPHENHYDRAMINE HYDROCHLORIDE 50 MG/ML
50 INJECTION INTRAMUSCULAR; INTRAVENOUS AS NEEDED
OUTPATIENT
Start: 2024-09-28

## 2024-09-25 ASSESSMENT — ENCOUNTER SYMPTOMS
PSYCHIATRIC NEGATIVE: 1
CARDIOVASCULAR NEGATIVE: 1
ENDOCRINE NEGATIVE: 1
MUSCULOSKELETAL NEGATIVE: 1
HEMATOLOGIC/LYMPHATIC NEGATIVE: 1
GASTROINTESTINAL NEGATIVE: 1
CONSTITUTIONAL NEGATIVE: 1
EYES NEGATIVE: 1
NEUROLOGICAL NEGATIVE: 1
RESPIRATORY NEGATIVE: 1

## 2024-09-25 NOTE — PROGRESS NOTES
[facility]   Infusion Clinic Note   Date: 2024   Name: Cielo Peters  : 1960   MRN: [id number]          Reason for Visit: OP Infusion (PATIENT HERE FOR HER VENOFER 200MG INFUSION )         Today: We administered iron sucrose (Venofer) 200 mg in sodium chloride 0.9% 110 mL IV.       Visit Type: INFUSION       Ordered By: MARIA D FAY MD       Accompanied by:       Diagnosis: Anemia, unspecified type        Allergies:   Allergies as of 2024 - Reviewed 2024   Allergen Reaction Noted    Atorvastatin Other 2016    Codeine Hallucinations 10/28/2011          Current Medications has a current medication list which includes the following prescription(s): fluconazole, venofer, levothyroxine, metformin, rhofade, pantoprazole, sucralfate, tizanidine, and venlafaxine xr.       Vitals:   Vitals:    24 0755 24 0835   BP: 100/67 95/67   Pulse: 87 72   Resp: 18 18   Temp:  36.4 °C (97.5 °F)   SpO2: 96% 97%             Infusion Pre-procedure Checklist:   - Allergies reviewed: yes   - Medications reviewed: yes       - Previous reaction to current treatment: no      Assess patient for the concerns below. Document provider notification as appropriate.  - Active or recent infection with/without current antibiotic use: no  - Recent or planned invasive dental work: no  - Recent or planned surgeries: no  - Recently received or plans to receive vaccinations: no  - Has treatment related toxicities: no  - Is pregnant:  no      Pain: 0   - Is the pain different from normal: n/a   - Is your Doctor aware:  n/a       Labs: N/A          Fall Risk Screening: Crawford Fall Risk  History of Falling, Immediate or Within 3 Months: Yes  Secondary Diagnosis: No  Ambulatory Aid: Walks without aid/bedrest/nurse assist  Intravenous Therapy/Heparin Lock: No  Gait/Transferring: Normal/bedrest/immobile  Mental Status: Oriented to own ability  Crawford Fall Risk Score:  25       Review Of Systems:  Review of Systems   Constitutional: Negative.    HENT:  Negative.     Eyes: Negative.    Respiratory: Negative.     Cardiovascular: Negative.    Gastrointestinal: Negative.    Endocrine: Negative.    Genitourinary: Negative.     Musculoskeletal: Negative.    Skin: Negative.    Neurological: Negative.    Hematological: Negative.    Psychiatric/Behavioral: Negative.           ROS completed? Yes      Infusion Readiness:  - Assessment Concerns Related to Infusion: No  - Provider notified: n/a      Document Below Only If Indicated:   New Patient Education:    NEW PATIENT MEDICATION EDUCATION PT PROVIDED WITH WRITTEN ([employer] PT EDUCATION SHEET) AND VERBAL EDUCATION REGARDING MEDICATION GIVEN. VERIFIED MEDICATION NAME WITH PATIENT AND DISCUSSED REASON FOR USE. BRIEFLY DISCUSSED HOW MEDICATION WORKS AND EDUCATED ON GOAL OF TREATMENT, FREQUENCY OF TREATMENT, ADVERSE RXN'S AND COMMON SIDE EFFECTS TO MONITOR FOR. INSTRUCTED PT TO ASSURE THAT ALL PROVIDERS INCLUDING DENTISTS ARE AWARE OF MEDICATION RECEIVED. DISCUSSED FLOW OF VISIT AND ORIENTED TO INFUSION CENTER. PT VERBALIZES UNDERSTANDING. CALL LIGHT PROVIDED AND PT AWARE TO ALERT STAFF OF ANY CONCERNS DURING TREATMENT.        Treatment Conditions & Drug Specific Questions:    Iron Sucrose (VENOFER)     (Unless otherwise specified on patient specific therapy plan):    REMINDERS:  May cause transient hypotension. Supine position recommended for infusion.     Recommended Vitals/Observation:  Vitals: Obtain vital signs before and after each infusion.  Observation: 30 minutes after the infusion is complete.    Lab Results   Component Value Date    IRON 16 (L) 09/12/2024    TIBC  09/12/2024      Comment:      One or more of the analytes used in this calculation is outside of the analytical measurement range.      FERRITIN 10 09/12/2024     Lab Results   Component Value Date    IRONSAT  09/12/2024      Comment:      One or more analytes used in this  calculation is outside of the analytical measurement range. Calculation cannot be performed.      Lab Results   Component Value Date    WBC 4.6 09/12/2024    HGB 7.9 (L) 09/12/2024    HCT 27.9 (L) 09/12/2024    MCV 89 09/12/2024     09/12/2024            Weight Based Drug Calculations:    WEIGHT BASED DRUGS: NOT APPLICABLE / FLAT DOSE          Initiated By: Elina Hernandez RN      All questions and concerns were addressed at time of visit. Patient was not independently evaluated by the Nurse Practitioner on site at [facility].    09/25/24 at 9:47 AM - FRANKLIN White-CNP

## 2024-09-25 NOTE — PATIENT INSTRUCTIONS
Today you received:  VENOFER 200MG INFUSION 1/5      For:    1. Anemia, unspecified type            Please read the  Medication Guide that was given to you and reviewed during todays visit.     (Tell all doctors including dentists that you are taking this medication)     Go to the emergency room or call 911 if:  -You have signs of allergic reaction:   o         Rash, hives, itching.   o         Swollen, blistered, peeling skin.   o         Swelling of face, lips, mouth, tongue or throat.   o         Tightness of chest, trouble breathing, swallowing or talking      Call your doctor:     - If IV / injection site gets red, warm, swollen, itchy or leaks fluid or pus.     (Leave dressing on your IV site for at least 2 hours and keep area clean and dry    - If you get sick or have symptoms of infection or are not feeling well for any reason.    (Wash your hands often, stay away from people who are sick)    - If you have side effects from your medication that do not go away or are bothersome.     (Refer to the teaching your nurse gave you for side effects to call your doctor about)     Common side effects may include:  stuffy nose, headache, feeling tired, muscle aches, upset stomach    - Before receiving any vaccines, Call the [facility] at [phone]     - You get sick, are on antibiotics, have had a recent vaccine, have surgery or dental work and your doctor wants your visit rescheduled.    - You need to cancel and reschedule your visit for any reason. Call at least 2 days before your visit if you need to cancel.     - Your insurance changes before your next visit.    (We will need to get approval from your new insurance. This can take up to two weeks.)     [facility] is opened Monday thru Friday 8am-8pm and Saturday 8am-4:30pm. We are closed on holidays.     Voice mail will take your call if the center is closed. If you leave a message please allow 24 hours for a call back  during weekdays. If you leave a message on a weekend/holiday, we will call you back the next business day.

## 2024-09-30 ENCOUNTER — APPOINTMENT (OUTPATIENT)
Dept: INFUSION THERAPY | Facility: CLINIC | Age: 64
End: 2024-09-30
Payer: COMMERCIAL

## 2024-09-30 VITALS
HEART RATE: 91 BPM | SYSTOLIC BLOOD PRESSURE: 118 MMHG | OXYGEN SATURATION: 99 % | RESPIRATION RATE: 16 BRPM | TEMPERATURE: 98.5 F | DIASTOLIC BLOOD PRESSURE: 69 MMHG

## 2024-09-30 DIAGNOSIS — D64.9 ANEMIA, UNSPECIFIED TYPE: ICD-10-CM

## 2024-09-30 PROCEDURE — 96365 THER/PROPH/DIAG IV INF INIT: CPT | Performed by: REGISTERED NURSE

## 2024-09-30 RX ORDER — FAMOTIDINE 10 MG/ML
20 INJECTION INTRAVENOUS ONCE AS NEEDED
Status: CANCELLED | OUTPATIENT
Start: 2024-10-01

## 2024-09-30 RX ORDER — ALBUTEROL SULFATE 0.83 MG/ML
3 SOLUTION RESPIRATORY (INHALATION) AS NEEDED
Status: CANCELLED | OUTPATIENT
Start: 2024-10-01

## 2024-09-30 RX ORDER — DIPHENHYDRAMINE HYDROCHLORIDE 50 MG/ML
50 INJECTION INTRAMUSCULAR; INTRAVENOUS AS NEEDED
Status: CANCELLED | OUTPATIENT
Start: 2024-10-01

## 2024-09-30 RX ORDER — EPINEPHRINE 0.3 MG/.3ML
0.3 INJECTION SUBCUTANEOUS EVERY 5 MIN PRN
Status: CANCELLED | OUTPATIENT
Start: 2024-10-01

## 2024-09-30 ASSESSMENT — ENCOUNTER SYMPTOMS
NEUROLOGICAL NEGATIVE: 1
MUSCULOSKELETAL NEGATIVE: 1
CONSTITUTIONAL NEGATIVE: 1
HEMATOLOGIC/LYMPHATIC NEGATIVE: 1
GASTROINTESTINAL NEGATIVE: 1
EYES NEGATIVE: 1
CARDIOVASCULAR NEGATIVE: 1
RESPIRATORY NEGATIVE: 1
ENDOCRINE NEGATIVE: 1

## 2024-09-30 ASSESSMENT — PAIN SCALES - GENERAL: PAINLEVEL: 0-NO PAIN

## 2024-09-30 NOTE — PROGRESS NOTES
[facility]   Infusion Clinic Note   Date: 2024   Name: Cielo Peters  : 1960   MRN: [id number]          Reason for Visit: OP Infusion (200 mg Venofer infusion #2)         Today: We administered iron sucrose (Venofer) 200 mg in sodium chloride 0.9% 110 mL IV.       Visit Type: INFUSION       Ordered By: MARIA D FAY MD       Accompanied by:Self       Diagnosis: Anemia, unspecified type        Allergies:   Allergies as of 2024 - Reviewed 2024   Allergen Reaction Noted    Atorvastatin Other 2016    Codeine Hallucinations 10/28/2011          Current Medications has a current medication list which includes the following prescription(s): fluconazole, levothyroxine, metformin, rhofade, pantoprazole, sucralfate, tizanidine, and venlafaxine xr.       Vitals:   Vitals:    24 0828   BP: 118/69   Pulse: 91   Resp: 16   Temp: 36.9 °C (98.5 °F)   SpO2: 99%   PainSc: 0-No pain             Infusion Pre-procedure Checklist:   - Allergies reviewed: yes   - Medications reviewed: yes       - Previous reaction to current treatment: no      Assess patient for the concerns below. Document provider notification as appropriate.  - Active or recent infection with/without current antibiotic use: no  - Recent or planned invasive dental work: no  - Recent or planned surgeries: no  - Recently received or plans to receive vaccinations: no  - Has treatment related toxicities: no  - Is pregnant:  no      Pain: 0   - Is the pain different from normal: no   - Is your Doctor aware:  n/a       Labs: N/A          Fall Risk Screening: Crawford Fall Risk  History of Falling, Immediate or Within 3 Months: No  Secondary Diagnosis: No  Ambulatory Aid: Walks without aid/bedrest/nurse assist  Intravenous Therapy/Heparin Lock: No  Gait/Transferring: Normal/bedrest/immobile  Mental Status: Oriented to own ability  Crawford Fall Risk Score: 0       Review Of Systems:  Review of Systems    Constitutional: Negative.    HENT:  Negative.     Eyes: Negative.    Respiratory: Negative.     Cardiovascular: Negative.    Gastrointestinal: Negative.    Endocrine: Negative.    Genitourinary: Negative.     Musculoskeletal: Negative.    Skin: Negative.    Neurological: Negative.    Hematological: Negative.    Psychiatric/Behavioral:          NA         ROS completed? Yes      Infusion Readiness:  - Assessment Concerns Related to Infusion: No  - Provider notified: n/a      Document Below Only If Indicated:   New Patient Education:    N/A (returning patient for continuation of therapy. Ongoing education provided as needed.)        Treatment Conditions & Drug Specific Questions:    Iron Sucrose (VENOFER)     (Unless otherwise specified on patient specific therapy plan):    REMINDERS:  May cause transient hypotension. Supine position recommended for infusion.     Recommended Vitals/Observation:  Vitals: Obtain vital signs before and after each infusion.  Observation: 30 minutes after the infusion is complete.    Lab Results   Component Value Date    IRON 16 (L) 09/12/2024    TIBC  09/12/2024      Comment:      One or more of the analytes used in this calculation is outside of the analytical measurement range.      FERRITIN 10 09/12/2024     Lab Results   Component Value Date    IRONSAT  09/12/2024      Comment:      One or more analytes used in this calculation is outside of the analytical measurement range. Calculation cannot be performed.      Lab Results   Component Value Date    WBC 4.6 09/12/2024    HGB 7.9 (L) 09/12/2024    HCT 27.9 (L) 09/12/2024    MCV 89 09/12/2024     09/12/2024            Weight Based Drug Calculations:    WEIGHT BASED DRUGS: NOT APPLICABLE / FLAT DOSE          Initiated By: Elinor Charlton RN      All questions and concerns were addressed at time of visit. Patient was not independently evaluated by the Nurse Practitioner on site at [facility].    09/30/24 at 1:58 PM -  Vanesa Ibarra, APRN-CNP

## 2024-09-30 NOTE — PATIENT INSTRUCTIONS
Today :We administered iron sucrose (Venofer) 200 mg in sodium chloride 0.9% 110 mL IV.     For:   1. Anemia, unspecified type         Your next appointment is due in:  10/4/24 #3        Please read the  Medication Guide that was given to you and reviewed during todays visit.     (Tell all doctors including dentists that you are taking this medication)     Go to the emergency room or call 911 if:  -You have signs of allergic reaction:   -Rash, hives, itching.   -Swollen, blistered, peeling skin.   -Swelling of face, lips, mouth, tongue or throat.   -Tightness of chest, trouble breathing, swallowing or talking     Call your doctor:  - If IV / injection site gets red, warm, swollen, itchy or leaks fluid or pus.     (Leave dressing on your IV site for at least 2 hours and keep area clean and dry  - If you get sick or have symptoms of infection or are not feeling well for any reason.    (Wash your hands often, stay away from people who are sick)  - If you have side effects from your medication that do not go away or are bothersome.     (Refer to the teaching your nurse gave you for side effects to call your doctor about)    - Common side effects may include:  stuffy nose, headache, feeling tired, muscle aches, upset stomach  - Before receiving any vaccines     - Call [facility] at   If:  - You get sick, are on antibiotics, have had a recent vaccine, have surgery or dental work and your doctor wants your visit rescheduled.  - You need to cancel and reschedule your visit for any reason. Call at least 2 days before your visit if you need to cancel.   - Your insurance changes before your next visit.    (We will need to get approval from your new insurance. This can take up to two weeks.)     [facility] is opened Monday thru Friday. We are closed on weekends and holidays.   Voice mail will take your call if the center is closed. If you leave a message please allow 24 hours for a call  back during weekdays. If you leave a message on a weekend/holiday, we will call you back the next business day.

## 2024-10-02 ENCOUNTER — ANESTHESIA EVENT (OUTPATIENT)
Dept: GASTROENTEROLOGY | Facility: EXTERNAL LOCATION | Age: 64
End: 2024-10-02

## 2024-10-04 ENCOUNTER — APPOINTMENT (OUTPATIENT)
Dept: INFUSION THERAPY | Facility: CLINIC | Age: 64
End: 2024-10-04
Payer: COMMERCIAL

## 2024-10-04 VITALS
DIASTOLIC BLOOD PRESSURE: 78 MMHG | TEMPERATURE: 97.4 F | RESPIRATION RATE: 16 BRPM | HEART RATE: 85 BPM | OXYGEN SATURATION: 97 % | SYSTOLIC BLOOD PRESSURE: 127 MMHG

## 2024-10-04 DIAGNOSIS — D64.9 ANEMIA, UNSPECIFIED TYPE: ICD-10-CM

## 2024-10-04 RX ORDER — FAMOTIDINE 10 MG/ML
20 INJECTION INTRAVENOUS ONCE AS NEEDED
OUTPATIENT
Start: 2024-10-06

## 2024-10-04 RX ORDER — ALBUTEROL SULFATE 0.83 MG/ML
3 SOLUTION RESPIRATORY (INHALATION) AS NEEDED
OUTPATIENT
Start: 2024-10-06

## 2024-10-04 RX ORDER — DIPHENHYDRAMINE HYDROCHLORIDE 50 MG/ML
50 INJECTION INTRAMUSCULAR; INTRAVENOUS AS NEEDED
OUTPATIENT
Start: 2024-10-06

## 2024-10-04 RX ORDER — EPINEPHRINE 0.3 MG/.3ML
0.3 INJECTION SUBCUTANEOUS EVERY 5 MIN PRN
OUTPATIENT
Start: 2024-10-06

## 2024-10-04 ASSESSMENT — ENCOUNTER SYMPTOMS
ARTHRALGIAS: 1
FATIGUE: 1
DIZZINESS: 0
HEADACHES: 0
EXTREMITY WEAKNESS: 0
SHORTNESS OF BREATH: 0
CONSTIPATION: 0
LIGHT-HEADEDNESS: 0

## 2024-10-04 NOTE — PATIENT INSTRUCTIONS
Today :We administered iron sucrose (Venofer) 200 mg in sodium chloride 0.9% 110 mL IV.     For:   1. Anemia, unspecified type         Your next appointment is due in:  WED 9AM        Please read the  Medication Guide that was given to you and reviewed during todays visit.     (Tell all doctors including dentists that you are taking this medication)     Go to the emergency room or call 911 if:  -You have signs of allergic reaction:   -Rash, hives, itching.   -Swollen, blistered, peeling skin.   -Swelling of face, lips, mouth, tongue or throat.   -Tightness of chest, trouble breathing, swallowing or talking     Call your doctor:  - If IV / injection site gets red, warm, swollen, itchy or leaks fluid or pus.     (Leave dressing on your IV site for at least 2 hours and keep area clean and dry  - If you get sick or have symptoms of infection or are not feeling well for any reason.    (Wash your hands often, stay away from people who are sick)  - If you have side effects from your medication that do not go away or are bothersome.     (Refer to the teaching your nurse gave you for side effects to call your doctor about)    - Common side effects may include:  stuffy nose, headache, feeling tired, muscle aches, upset stomach  - Before receiving any vaccines     - Call the [facility] at   If:  - You get sick, are on antibiotics, have had a recent vaccine, have surgery or dental work and your doctor wants your visit rescheduled.  - You need to cancel and reschedule your visit for any reason. Call at least 2 days before your visit if you need to cancel.   - Your insurance changes before your next visit.    (We will need to get approval from your new insurance. This can take up to two weeks.)     [facility] is opened Monday thru Friday. We are closed on weekends and holidays.   Voice mail will take your call if the center is closed. If you leave a message please allow 24 hours for a call  back during weekdays. If you leave a message on a weekend/holiday, we will call you back the next business day.

## 2024-10-04 NOTE — PROGRESS NOTES
[facility]   Infusion Clinic Note   Date: 2024   Name: Cielo Peters  : 1960   MRN: [id number]          Reason for Visit: OP Infusion (VENOFER 200 MG)         Today: We administered iron sucrose (Venofer) 200 mg in sodium chloride 0.9% 110 mL IV.       Visit Type: INFUSION       Ordered By: EBONI FAY MD       Accompanied by:Self       Diagnosis: Anemia, unspecified type        Allergies:   Allergies as of 10/04/2024 - Reviewed 10/04/2024   Allergen Reaction Noted    Atorvastatin Other 2016    Codeine Hallucinations 10/28/2011          Current Medications has a current medication list which includes the following prescription(s): fluconazole, levothyroxine, metformin, rhofade, pantoprazole, sucralfate, tizanidine, and venlafaxine xr.       Vitals:   Vitals:    10/04/24 0749   BP: 127/78   Pulse: 85   Resp: 16   Temp: 36.3 °C (97.4 °F)   SpO2: 97%             Infusion Pre-procedure Checklist:   - Allergies reviewed: yes   - Medications reviewed: yes       - Previous reaction to current treatment: no      Assess patient for the concerns below. Document provider notification as appropriate.  - Active or recent infection with/without current antibiotic use: no  - Recent or planned invasive dental work: no  - Recent or planned surgeries: no  - Recently received or plans to receive vaccinations: no  - Has treatment related toxicities: no  - Is pregnant:  no      Pain: 0   - Is the pain different from normal: n/a   - Is your Doctor aware:  n/a       Labs: N/A          Fall Risk Screening: Crawford Fall Risk  History of Falling, Immediate or Within 3 Months: No  Ambulatory Aid: Walks without aid/bedrest/nurse assist  Intravenous Therapy/Heparin Lock: Yes  Gait/Transferring: Normal/bedrest/immobile  Mental Status: Oriented to own ability       Review Of Systems:  Review of Systems   Constitutional:  Positive for fatigue.   Respiratory:  Negative for shortness of breath.     Cardiovascular:  Negative for chest pain.   Gastrointestinal:  Negative for constipation.   Musculoskeletal:  Positive for arthralgias. Negative for gait problem.   Neurological:  Negative for dizziness, extremity weakness, gait problem, headaches and light-headedness.         ROS completed? Yes      Infusion Readiness:  - Assessment Concerns Related to Infusion: No  - Provider notified: no      Document Below Only If Indicated:   New Patient Education:    N/A (returning patient for continuation of therapy. Ongoing education provided as needed.)        Treatment Conditions & Drug Specific Questions:    Iron Sucrose (VENOFER)     (Unless otherwise specified on patient specific therapy plan):    REMINDERS:  May cause transient hypotension. Supine position recommended for infusion.     Recommended Vitals/Observation:  Vitals: Obtain vital signs before and after each infusion.  Observation: 30 minutes after the infusion is complete.    Lab Results   Component Value Date    IRON 16 (L) 09/12/2024    TIBC  09/12/2024      Comment:      One or more of the analytes used in this calculation is outside of the analytical measurement range.      FERRITIN 10 09/12/2024     Lab Results   Component Value Date    IRONSAT  09/12/2024      Comment:      One or more analytes used in this calculation is outside of the analytical measurement range. Calculation cannot be performed.      Lab Results   Component Value Date    WBC 4.6 09/12/2024    HGB 7.9 (L) 09/12/2024    HCT 27.9 (L) 09/12/2024    MCV 89 09/12/2024     09/12/2024            Weight Based Drug Calculations:    WEIGHT BASED DRUGS: NOT APPLICABLE / FLAT DOSE          Initiated By: Pearl Ingram RN

## 2024-10-07 ENCOUNTER — APPOINTMENT (OUTPATIENT)
Dept: GASTROENTEROLOGY | Facility: CLINIC | Age: 64
End: 2024-10-07
Payer: COMMERCIAL

## 2024-10-09 ENCOUNTER — APPOINTMENT (OUTPATIENT)
Dept: INFUSION THERAPY | Facility: CLINIC | Age: 64
End: 2024-10-09
Payer: COMMERCIAL

## 2024-10-09 VITALS
HEART RATE: 77 BPM | SYSTOLIC BLOOD PRESSURE: 136 MMHG | OXYGEN SATURATION: 100 % | DIASTOLIC BLOOD PRESSURE: 77 MMHG | TEMPERATURE: 97 F | RESPIRATION RATE: 16 BRPM

## 2024-10-09 DIAGNOSIS — D64.9 ANEMIA, UNSPECIFIED TYPE: ICD-10-CM

## 2024-10-09 PROCEDURE — 96365 THER/PROPH/DIAG IV INF INIT: CPT | Performed by: REGISTERED NURSE

## 2024-10-09 RX ORDER — ALBUTEROL SULFATE 0.83 MG/ML
3 SOLUTION RESPIRATORY (INHALATION) AS NEEDED
OUTPATIENT
Start: 2024-10-10

## 2024-10-09 RX ORDER — FAMOTIDINE 10 MG/ML
20 INJECTION INTRAVENOUS ONCE AS NEEDED
OUTPATIENT
Start: 2024-10-10

## 2024-10-09 RX ORDER — DIPHENHYDRAMINE HYDROCHLORIDE 50 MG/ML
50 INJECTION INTRAMUSCULAR; INTRAVENOUS AS NEEDED
OUTPATIENT
Start: 2024-10-10

## 2024-10-09 RX ORDER — EPINEPHRINE 0.3 MG/.3ML
0.3 INJECTION SUBCUTANEOUS EVERY 5 MIN PRN
OUTPATIENT
Start: 2024-10-10

## 2024-10-09 ASSESSMENT — ENCOUNTER SYMPTOMS
GASTROINTESTINAL NEGATIVE: 1
CARDIOVASCULAR NEGATIVE: 1
MUSCULOSKELETAL NEGATIVE: 1
ENDOCRINE NEGATIVE: 1
CONSTITUTIONAL NEGATIVE: 1
HEMATOLOGIC/LYMPHATIC NEGATIVE: 1
EYES NEGATIVE: 1
PSYCHIATRIC NEGATIVE: 1
RESPIRATORY NEGATIVE: 1
NEUROLOGICAL NEGATIVE: 1

## 2024-10-09 NOTE — PATIENT INSTRUCTIONS
Today you received:  VENOFER 200MG INFUSION 4/5      For:    1. Anemia, unspecified type            Please read the  Medication Guide that was given to you and reviewed during todays visit.     (Tell all doctors including dentists that you are taking this medication)     Go to the emergency room or call 911 if:  -You have signs of allergic reaction:   o         Rash, hives, itching.   o         Swollen, blistered, peeling skin.   o         Swelling of face, lips, mouth, tongue or throat.   o         Tightness of chest, trouble breathing, swallowing or talking      Call your doctor:     - If IV / injection site gets red, warm, swollen, itchy or leaks fluid or pus.     (Leave dressing on your IV site for at least 2 hours and keep area clean and dry    - If you get sick or have symptoms of infection or are not feeling well for any reason.    (Wash your hands often, stay away from people who are sick)    - If you have side effects from your medication that do not go away or are bothersome.     (Refer to the teaching your nurse gave you for side effects to call your doctor about)     Common side effects may include:  stuffy nose, headache, feeling tired, muscle aches, upset stomach    - Before receiving any vaccines, Call the [facility] at [phone]     - You get sick, are on antibiotics, have had a recent vaccine, have surgery or dental work and your doctor wants your visit rescheduled.    - You need to cancel and reschedule your visit for any reason. Call at least 2 days before your visit if you need to cancel.     - Your insurance changes before your next visit.    (We will need to get approval from your new insurance. This can take up to two weeks.)     [facility] is opened Monday thru Friday 8am-8pm and Saturday 8am-4:30pm. We are closed on holidays.     Voice mail will take your call if the center is closed. If you leave a message please allow 24 hours for a call back  during weekdays. If you leave a message on a weekend/holiday, we will call you back the next business day.

## 2024-10-09 NOTE — PROGRESS NOTES
[facility]   Infusion Clinic Note   Date: 2024   Name: Cielo Peters  : 1960   MRN: [id number]          Reason for Visit: OP Infusion (PATIENT HERE FOR HER VENOFER 200MG INFUSION )         Today: We administered iron sucrose (Venofer) 200 mg in sodium chloride 0.9% 110 mL IV.       Visit Type: INFUSION       Ordered By: MARIA D FAY MD       Accompanied by:Self       Diagnosis: Anemia, unspecified type        Allergies:   Allergies as of 10/09/2024 - Reviewed 10/09/2024   Allergen Reaction Noted    Atorvastatin Other 2016    Codeine Hallucinations 10/28/2011          Current Medications has a current medication list which includes the following prescription(s): fluconazole, levothyroxine, metformin, rhofade, pantoprazole, sucralfate, tizanidine, and venlafaxine xr.       Vitals:   Vitals:    10/09/24 0858   BP: 136/77   Pulse: 77   Resp: 16   Temp: 36.1 °C (97 °F)   SpO2: 100%             Infusion Pre-procedure Checklist:   - Allergies reviewed: yes   - Medications reviewed: yes       - Previous reaction to current treatment: no      Assess patient for the concerns below. Document provider notification as appropriate.  - Active or recent infection with/without current antibiotic use: no  - Recent or planned invasive dental work: no  - Recent or planned surgeries: no  - Recently received or plans to receive vaccinations: no  - Has treatment related toxicities: no  - Is pregnant:  no      Pain: 0   - Is the pain different from normal: n/a   - Is your Doctor aware:  n/a       Labs: N/A          Fall Risk Screening: Crawford Fall Risk  History of Falling, Immediate or Within 3 Months: No  Secondary Diagnosis: No  Ambulatory Aid: Walks without aid/bedrest/nurse assist  Intravenous Therapy/Heparin Lock: No  Gait/Transferring: Normal/bedrest/immobile  Mental Status: Oriented to own ability  Crawford Fall Risk Score: 0       Review Of Systems:  Review of Systems    Constitutional: Negative.    HENT:  Negative.     Eyes: Negative.    Respiratory: Negative.     Cardiovascular: Negative.    Gastrointestinal: Negative.    Endocrine: Negative.    Genitourinary: Negative.     Musculoskeletal: Negative.    Skin: Negative.    Neurological: Negative.    Hematological: Negative.    Psychiatric/Behavioral: Negative.           ROS completed? Yes      Infusion Readiness:  - Assessment Concerns Related to Infusion: No  - Provider notified: n/a      Document Below Only If Indicated:   New Patient Education:    N/A (returning patient for continuation of therapy. Ongoing education provided as needed.)        Treatment Conditions & Drug Specific Questions:    Iron Sucrose (VENOFER)     (Unless otherwise specified on patient specific therapy plan):    REMINDERS:  May cause transient hypotension. Supine position recommended for infusion.     Recommended Vitals/Observation:  Vitals: Obtain vital signs before and after each infusion.  Observation: 30 minutes after the infusion is complete.    Lab Results   Component Value Date    IRON 16 (L) 09/12/2024    TIBC  09/12/2024      Comment:      One or more of the analytes used in this calculation is outside of the analytical measurement range.      FERRITIN 10 09/12/2024     Lab Results   Component Value Date    IRONSAT  09/12/2024      Comment:      One or more analytes used in this calculation is outside of the analytical measurement range. Calculation cannot be performed.      Lab Results   Component Value Date    WBC 4.6 09/12/2024    HGB 7.9 (L) 09/12/2024    HCT 27.9 (L) 09/12/2024    MCV 89 09/12/2024     09/12/2024            Weight Based Drug Calculations:    WEIGHT BASED DRUGS: NOT APPLICABLE / FLAT DOSE          Initiated By: Elina Hernandez RN      All questions and concerns were addressed at time of visit. Patient was not independently evaluated by the Nurse Practitioner on site at [facility].    10/09/24 at 10:51 AM -  Vanesa Ibarra, APRN-CNP

## 2024-10-14 ENCOUNTER — ANESTHESIA (OUTPATIENT)
Dept: GASTROENTEROLOGY | Facility: EXTERNAL LOCATION | Age: 64
End: 2024-10-14

## 2024-10-14 ENCOUNTER — APPOINTMENT (OUTPATIENT)
Dept: GASTROENTEROLOGY | Facility: EXTERNAL LOCATION | Age: 64
End: 2024-10-14
Payer: COMMERCIAL

## 2024-10-14 VITALS
OXYGEN SATURATION: 98 % | HEIGHT: 62 IN | BODY MASS INDEX: 29.44 KG/M2 | WEIGHT: 160 LBS | DIASTOLIC BLOOD PRESSURE: 67 MMHG | RESPIRATION RATE: 14 BRPM | HEART RATE: 72 BPM | TEMPERATURE: 96.8 F | SYSTOLIC BLOOD PRESSURE: 104 MMHG

## 2024-10-14 DIAGNOSIS — K26.9 DUODENAL ULCER: ICD-10-CM

## 2024-10-14 DIAGNOSIS — K25.0 ACUTE GASTRIC ULCER WITH HEMORRHAGE: ICD-10-CM

## 2024-10-14 PROCEDURE — 43239 EGD BIOPSY SINGLE/MULTIPLE: CPT | Performed by: STUDENT IN AN ORGANIZED HEALTH CARE EDUCATION/TRAINING PROGRAM

## 2024-10-14 RX ORDER — SODIUM CHLORIDE 9 MG/ML
INJECTION, SOLUTION INTRAVENOUS CONTINUOUS PRN
Status: DISCONTINUED | OUTPATIENT
Start: 2024-10-14 | End: 2024-10-14

## 2024-10-14 RX ORDER — LIDOCAINE HYDROCHLORIDE 20 MG/ML
INJECTION, SOLUTION INFILTRATION; PERINEURAL AS NEEDED
Status: DISCONTINUED | OUTPATIENT
Start: 2024-10-14 | End: 2024-10-14

## 2024-10-14 RX ORDER — PROPOFOL 10 MG/ML
INJECTION, EMULSION INTRAVENOUS AS NEEDED
Status: DISCONTINUED | OUTPATIENT
Start: 2024-10-14 | End: 2024-10-14

## 2024-10-14 SDOH — HEALTH STABILITY: MENTAL HEALTH: CURRENT SMOKER: 0

## 2024-10-14 ASSESSMENT — PAIN SCALES - GENERAL
PAINLEVEL_OUTOF10: 0 - NO PAIN
PAIN_LEVEL: 0

## 2024-10-14 ASSESSMENT — PAIN - FUNCTIONAL ASSESSMENT
PAIN_FUNCTIONAL_ASSESSMENT: 0-10

## 2024-10-14 NOTE — H&P
Outpatient NR Procedure H&P    Patient Profile  Name Cielo Peters  Date of Birth 1960  MRN [id number]  PCP Keara Montes        Diagnosis: Follow up of PUD.  Procedure(s):  EGD.    Allergies  Allergies   Allergen Reactions    Atorvastatin Other     Muscle & joint pain    Codeine Hallucinations       Past Medical History   Past Medical History:   Diagnosis Date    Abnormal mammogram 08/2022    left breast cat 4-had bx--2/2023-left cat 1--bilateral due 8/2023    Basal cell carcinoma     Borderline diabetes mellitus     Depression     GERD (gastroesophageal reflux disease)     History of mammogram 08/08/2024    cat 2 (had bilateral ultrasounds)    Hypothyroidism        Medication Reviewed - yes  Prior to Admission medications    Medication Sig Start Date End Date Taking? Authorizing Provider   fluconazole (Diflucan) 100 mg tablet Take 1 tablet (100 mg) by mouth once daily.   Yes Historical Provider, MD   levothyroxine (Synthroid, Levoxyl) 88 mcg tablet Take 1 tablet (88 mcg) by mouth once daily. Take on an empty stomach at the same time each day, either 30 to 60 minutes prior to breakfast 9/23/24  Yes Keara Montes MD   metFORMIN (Glucophage) 500 mg tablet TAKE 1 TABLET DAILY 1/2/24  Yes Keara Montes MD   oxymetazoline (Rhofade) 1 % cream APPLY TO THE AFFECTED AREA(S) DAILY 6/20/23  Yes Keara Montes MD   pantoprazole (ProtoNix) 40 mg EC tablet Take 1 tablet (40 mg) by mouth 2 times a day. Do not crush, chew, or split. 9/23/24 10/23/24 Yes Keara Montes MD   sucralfate (Carafate) 100 mg/mL suspension Take 10 mL (1 g) by mouth 2 times a day. 9/23/24 10/23/24 Yes Keara Montes MD   tiZANidine (Zanaflex) 2 mg tablet TAKE 1 TABLET AT BEDTIME AS NEEDED FOR MUSCLE SPASMS 7/8/24  Yes Keara Montes MD   venlafaxine XR (Effexor-XR) 150 mg 24 hr capsule Take 1 capsule (150 mg) by mouth once daily. take with food 9/23/24  Yes Keara Montes MD        Physical Exam  Vitals:    10/14/24 0903   BP: (!) 114/49   Pulse: 81   Resp: 11   Temp: 36.6 °C (97.9 °F)   SpO2: 98%      Weight   Vitals:    10/14/24 0903   Weight: 72.6 kg (160 lb)     BMI Body mass index is 29.26 kg/m².    General: A&Ox3, NAD.  HEENT: AT/NC.   CV: RRR. No murmur.  Resp: CTA bilaterally. No wheezing, rhonchi or rales.   GI: Soft, NT/ND.   Extrem: No edema. Pulses intact.  Skin: No Jaundice.   Neuro: No focal deficits.   Psych: Normal mood and affect.        Oropharyngeal Classification III (soft and hard palate and base of uvula visible)  ASA PS Classification 3  Sedation Plan: Deep Sedation.  Procedure Plan - pre-procedural (re)assesment completed by physician:  discharge/transfer patient when discharge criteria met    Larry Maradiaga DO  10/14/2024 9:36 AM

## 2024-10-14 NOTE — ANESTHESIA PREPROCEDURE EVALUATION
Patient: Cielo Peters    Procedure Information       Date/Time: 10/14/24 0930    Scheduled providers: Larry Maradiaga DO    Procedure: EGD    Location: [facility]            Relevant Problems   Anesthesia   (+) PONV (postoperative nausea and vomiting)      Cardiac   (+) CAD (coronary artery disease)   (+) Mixed hyperlipidemia      Neuro   (+) Anxiety   (+) Depression      /Renal   (+) Nephrolithiasis      Endocrine   (+) Hypothyroidism      Hematology   (+) Anemia      Musculoskeletal   (+) Stenosis of lumbosacral spine      HEENT   (+) Conductive hearing loss, bilateral      ID   (+) Candidal intertrigo       Clinical information reviewed:   Tobacco  Allergies  Meds   Med Hx  Surg Hx  OB Status  Fam Hx  Soc   Hx      Vitals:    10/14/24 0903   BP: (!) 114/49   Pulse: 81   Resp: 11   Temp: 36.6 °C (97.9 °F)   SpO2: 98%       Past Surgical History:   Procedure Laterality Date    BACK SURGERY  2017    BREAST BIOPSY Left 2013    benign exc. bx in 2013 and core bx in 2022    CARDIAC CATHETERIZATION  2014    mostly (-) per pt    CARPAL TUNNEL RELEASE Right 1984    CHOLECYSTECTOMY  2013    COLONOSCOPY W/ POLYPECTOMY  10/2023    wnl-due 2028    HYSTERECTOMY  2009    tahbso no ca    LITHOTRIPSY Right 11/2020     Past Medical History:   Diagnosis Date    Abnormal mammogram 08/2022    left breast cat 4-had bx--2/2023-left cat 1--bilateral due 8/2023    Basal cell carcinoma     Borderline diabetes mellitus     Depression     GERD (gastroesophageal reflux disease)     History of mammogram 08/08/2024    cat 2 (had bilateral ultrasounds)    Hypothyroidism        Current Outpatient Medications:     fluconazole (Diflucan) 100 mg tablet, Take 1 tablet (100 mg) by mouth once daily., Disp: , Rfl:     levothyroxine (Synthroid, Levoxyl) 88 mcg tablet, Take 1 tablet (88 mcg) by mouth once daily. Take on an empty stomach at the same time each day, either 30 to 60 minutes prior to breakfast, Disp: 90  tablet, Rfl: 3    metFORMIN (Glucophage) 500 mg tablet, TAKE 1 TABLET DAILY, Disp: 90 tablet, Rfl: 3    oxymetazoline (Rhofade) 1 % cream, APPLY TO THE AFFECTED AREA(S) DAILY, Disp: 30 g, Rfl: 3    pantoprazole (ProtoNix) 40 mg EC tablet, Take 1 tablet (40 mg) by mouth 2 times a day. Do not crush, chew, or split., Disp: 60 tablet, Rfl: 3    sucralfate (Carafate) 100 mg/mL suspension, Take 10 mL (1 g) by mouth 2 times a day., Disp: 600 mL, Rfl: 1    tiZANidine (Zanaflex) 2 mg tablet, TAKE 1 TABLET AT BEDTIME AS NEEDED FOR MUSCLE SPASMS, Disp: 90 tablet, Rfl: 3    venlafaxine XR (Effexor-XR) 150 mg 24 hr capsule, Take 1 capsule (150 mg) by mouth once daily. take with food, Disp: 90 capsule, Rfl: 3  Prior to Admission medications    Medication Sig Start Date End Date Taking? Authorizing Provider   fluconazole (Diflucan) 100 mg tablet Take 1 tablet (100 mg) by mouth once daily.   Yes Historical Provider, MD   levothyroxine (Synthroid, Levoxyl) 88 mcg tablet Take 1 tablet (88 mcg) by mouth once daily. Take on an empty stomach at the same time each day, either 30 to 60 minutes prior to breakfast 9/23/24  Yes Keara Montes MD   metFORMIN (Glucophage) 500 mg tablet TAKE 1 TABLET DAILY 1/2/24  Yes Keara Montes MD   oxymetazoline (Rhofade) 1 % cream APPLY TO THE AFFECTED AREA(S) DAILY 6/20/23  Yes Keara Montes MD   pantoprazole (ProtoNix) 40 mg EC tablet Take 1 tablet (40 mg) by mouth 2 times a day. Do not crush, chew, or split. 9/23/24 10/23/24 Yes Keara Montes MD   sucralfate (Carafate) 100 mg/mL suspension Take 10 mL (1 g) by mouth 2 times a day. 9/23/24 10/23/24 Yes Keara Montes MD   tiZANidine (Zanaflex) 2 mg tablet TAKE 1 TABLET AT BEDTIME AS NEEDED FOR MUSCLE SPASMS 7/8/24  Yes Keara Montes MD   venlafaxine XR (Effexor-XR) 150 mg 24 hr capsule Take 1 capsule (150 mg) by mouth once daily. take with food 9/23/24  Yes Keara Montes MD     Allergies   Allergen  Reactions    Atorvastatin Other     Muscle & joint pain    Codeine Hallucinations     Social History     Tobacco Use    Smoking status: Never    Smokeless tobacco: Never   Substance Use Topics    Alcohol use: Never         Chemistry    Lab Results   Component Value Date/Time     08/05/2024 1002    K 3.5 08/05/2024 1002     08/05/2024 1002    CO2 26 08/05/2024 1002    BUN 17 08/05/2024 1002    CREATININE 0.70 08/05/2024 1002    Lab Results   Component Value Date/Time    CALCIUM 9.4 08/05/2024 1002    ALKPHOS 66 08/05/2024 1002    AST 14 08/05/2024 1002    ALT 14 08/05/2024 1002    BILITOT 0.2 08/05/2024 1002          Lab Results   Component Value Date/Time    WBC 4.6 09/12/2024 1000    HGB 7.9 (L) 09/12/2024 1000    HCT 27.9 (L) 09/12/2024 1000     09/12/2024 1000     Lab Results   Component Value Date/Time    PROTIME 10.9 08/01/2024 1424    INR 1.0 08/01/2024 1424     Encounter Date: 08/01/24   ECG 12 lead   Result Value    Ventricular Rate 80    Atrial Rate 80    AL Interval 134    QRS Duration 82    QT Interval 388    QTC Calculation(Bazett) 447    P Axis 54    R Axis 27    T Axis 46    QRS Count 13    Q Onset 219    P Onset 152    P Offset 206    T Offset 413    QTC Fredericia 427    Narrative    See ED provider note for full interpretation and clinical correlation Normal sinus rhythm  Low voltage QRS  Nonspecific T wave abnormality  Abnormal ECG  No previous ECGs available  Confirmed by Linda Valderrama ([id number]) on 8/1/2024 5:30:45 PM         NPO Detail:  NPO/Void Status  Date of Last Liquid: 10/13/24  Time of Last Liquid: 2100  Date of Last Solid: 10/12/24  Time of Last Solid: 1930  Last Intake Type: Clear fluids         Physical Exam    Airway  Mallampati: III  TM distance: >3 FB  Neck ROM: full     Cardiovascular   Rhythm: regular  Rate: normal     Dental    Pulmonary   Breath sounds clear to auscultation     Abdominal   (+) obese  Abdomen: soft  Bowel sounds: normal           Anesthesia  Plan    History of general anesthesia?: yes  History of complications of general anesthesia?: yes    ASA 3     MAC     The patient is not a current smoker.  Patient was not previously instructed to abstain from smoking on day of procedure.  Education provided regarding risk of obstructive sleep apnea.  intravenous induction   Anesthetic plan and risks discussed with patient.    Plan discussed with CRNA.

## 2024-10-14 NOTE — DISCHARGE INSTRUCTIONS

## 2024-10-14 NOTE — ANESTHESIA POSTPROCEDURE EVALUATION
Patient: Cielo Peters    Procedure Summary       Date: 10/14/24 Room / Location: [facility]    Anesthesia Start: 0935 Anesthesia Stop: 0953    Procedure: EGD Diagnosis:       Acute gastric ulcer with hemorrhage      Duodenal ulcer    Scheduled Providers: Larry Maradiaga DO Responsible Provider: BILLY Snider    Anesthesia Type: MAC ASA Status: 3            Anesthesia Type: MAC    Vitals Value Taken Time   /64 10/14/24 0948   Temp 36 °C (96.8 °F) 10/14/24 0948   Pulse 77 10/14/24 0948   Resp 16 10/14/24 0948   SpO2 95 % 10/14/24 0948       Anesthesia Post Evaluation    Patient location during evaluation: PACU  Patient participation: complete - patient participated  Level of consciousness: sleepy but conscious  Pain score: 0  Pain management: adequate  Airway patency: patent  Cardiovascular status: acceptable  Respiratory status: acceptable  Hydration status: acceptable  Postoperative Nausea and Vomiting: none        No notable events documented.    
Female

## 2024-10-15 ENCOUNTER — APPOINTMENT (OUTPATIENT)
Dept: INFUSION THERAPY | Facility: CLINIC | Age: 64
End: 2024-10-15
Payer: COMMERCIAL

## 2024-10-15 VITALS
TEMPERATURE: 97.7 F | HEART RATE: 84 BPM | RESPIRATION RATE: 16 BRPM | SYSTOLIC BLOOD PRESSURE: 129 MMHG | OXYGEN SATURATION: 99 % | DIASTOLIC BLOOD PRESSURE: 85 MMHG

## 2024-10-15 DIAGNOSIS — D64.9 ANEMIA, UNSPECIFIED TYPE: ICD-10-CM

## 2024-10-15 PROCEDURE — 96365 THER/PROPH/DIAG IV INF INIT: CPT | Performed by: NURSE PRACTITIONER

## 2024-10-15 RX ORDER — FAMOTIDINE 10 MG/ML
20 INJECTION INTRAVENOUS ONCE AS NEEDED
Status: CANCELLED | OUTPATIENT
Start: 2024-10-15

## 2024-10-15 RX ORDER — ALBUTEROL SULFATE 0.83 MG/ML
3 SOLUTION RESPIRATORY (INHALATION) AS NEEDED
Status: CANCELLED | OUTPATIENT
Start: 2024-10-15

## 2024-10-15 RX ORDER — DIPHENHYDRAMINE HYDROCHLORIDE 50 MG/ML
50 INJECTION INTRAMUSCULAR; INTRAVENOUS AS NEEDED
Status: CANCELLED | OUTPATIENT
Start: 2024-10-15

## 2024-10-15 RX ORDER — EPINEPHRINE 0.3 MG/.3ML
0.3 INJECTION SUBCUTANEOUS EVERY 5 MIN PRN
Status: CANCELLED | OUTPATIENT
Start: 2024-10-15

## 2024-10-15 ASSESSMENT — ENCOUNTER SYMPTOMS
EYES NEGATIVE: 1
HEMATOLOGIC/LYMPHATIC NEGATIVE: 1
PSYCHIATRIC NEGATIVE: 1
GASTROINTESTINAL NEGATIVE: 1
RESPIRATORY NEGATIVE: 1
NEUROLOGICAL NEGATIVE: 1
MUSCULOSKELETAL NEGATIVE: 1
ENDOCRINE NEGATIVE: 1
CONSTITUTIONAL NEGATIVE: 1
CARDIOVASCULAR NEGATIVE: 1

## 2024-10-15 NOTE — PATIENT INSTRUCTIONS
Today :We administered iron sucrose (Venofer) 200 mg in sodium chloride 0.9% 110 mL IV.     For:   1. Anemia, unspecified type         Your next appointment is due in:  Follow up with your provider        Please read the  Medication Guide that was given to you and reviewed during todays visit.     (Tell all doctors including dentists that you are taking this medication)     Go to the emergency room or call 911 if:  -You have signs of allergic reaction:   -Rash, hives, itching.   -Swollen, blistered, peeling skin.   -Swelling of face, lips, mouth, tongue or throat.   -Tightness of chest, trouble breathing, swallowing or talking     Call your doctor:  - If IV / injection site gets red, warm, swollen, itchy or leaks fluid or pus.     (Leave dressing on your IV site for at least 2 hours and keep area clean and dry  - If you get sick or have symptoms of infection or are not feeling well for any reason.    (Wash your hands often, stay away from people who are sick)  - If you have side effects from your medication that do not go away or are bothersome.     (Refer to the teaching your nurse gave you for side effects to call your doctor about)    - Common side effects may include:  stuffy nose, headache, feeling tired, muscle aches, upset stomach  - Before receiving any vaccines     - Call the [facility] at   If:  - You get sick, are on antibiotics, have had a recent vaccine, have surgery or dental work and your doctor wants your visit rescheduled.  - You need to cancel and reschedule your visit for any reason. Call at least 2 days before your visit if you need to cancel.   - Your insurance changes before your next visit.    (We will need to get approval from your new insurance. This can take up to two weeks.)     The [facility] is opened Monday thru Friday. We are closed on weekends and holidays.   Voice mail will take your call if the center is closed. If you leave a message please allow  24 hours for a call back during weekdays. If you leave a message on a weekend/holiday, we will call you back the next business day.               No

## 2024-10-15 NOTE — PROGRESS NOTES
[facility]   Infusion Clinic Note   Date: October 15, 2024   Name: Cielo Peters  : 1960   MRN: [id number]          Reason for Visit: OP Infusion (Pt here for 5/ Venofer infusion)         Today: We administered iron sucrose (Venofer) 200 mg in sodium chloride 0.9% 110 mL IV.       Visit Type: INFUSION       Ordered By: MARIA D FAY MD       Accompanied by:Self       Diagnosis: Anemia, unspecified type        Allergies:   Allergies as of 10/15/2024 - Reviewed 10/15/2024   Allergen Reaction Noted    Atorvastatin Other 2016    Codeine Hallucinations 10/28/2011          Current Medications has a current medication list which includes the following prescription(s): fluconazole, levothyroxine, metformin, rhofade, pantoprazole, sucralfate, tizanidine, and venlafaxine xr.       Vitals:   Vitals:    10/15/24 0901   BP: 129/85   Pulse: 84   Resp: 16   Temp: 36.5 °C (97.7 °F)   SpO2: 99%             Infusion Pre-procedure Checklist:   - Allergies reviewed: yes   - Medications reviewed: yes       - Previous reaction to current treatment: no      Assess patient for the concerns below. Document provider notification as appropriate.  - Active or recent infection with/without current antibiotic use: no  - Recent or planned invasive dental work: no  - Recent or planned surgeries: no  - Recently received or plans to receive vaccinations: no  - Has treatment related toxicities: no  - Is pregnant:  no      Pain: 0   - Is the pain different from normal: n/a   - Is your Doctor aware:  n/a       Labs: N/A          Fall Risk Screening: Crawford Fall Risk  History of Falling, Immediate or Within 3 Months: No  Secondary Diagnosis: No  Ambulatory Aid: Walks without aid/bedrest/nurse assist  Intravenous Therapy/Heparin Lock: Yes  Gait/Transferring: Normal/bedrest/immobile  Mental Status: Oriented to own ability  Crawford Fall Risk Score: 20       Review Of Systems:  Review of Systems    Constitutional: Negative.    HENT:  Negative.     Eyes: Negative.    Respiratory: Negative.     Cardiovascular: Negative.    Gastrointestinal: Negative.    Endocrine: Negative.    Genitourinary: Negative.     Musculoskeletal: Negative.    Skin: Negative.    Neurological: Negative.    Hematological: Negative.    Psychiatric/Behavioral: Negative.           ROS completed? Yes      Infusion Readiness:  - Assessment Concerns Related to Infusion: No  - Provider notified: n/a      Document Below Only If Indicated:   New Patient Education:    N/A (returning patient for continuation of therapy. Ongoing education provided as needed.)        Treatment Conditions & Drug Specific Questions:    Iron Sucrose (VENOFER)     (Unless otherwise specified on patient specific therapy plan):    REMINDERS:  May cause transient hypotension. Supine position recommended for infusion.     Recommended Vitals/Observation:  Vitals: Obtain vital signs before and after each infusion.  Observation: 30 minutes after the infusion is complete.    Lab Results   Component Value Date    IRON 16 (L) 09/12/2024    TIBC  09/12/2024      Comment:      One or more of the analytes used in this calculation is outside of the analytical measurement range.      FERRITIN 10 09/12/2024     Lab Results   Component Value Date    IRONSAT  09/12/2024      Comment:      One or more analytes used in this calculation is outside of the analytical measurement range. Calculation cannot be performed.      Lab Results   Component Value Date    WBC 4.6 09/12/2024    HGB 7.9 (L) 09/12/2024    HCT 27.9 (L) 09/12/2024    MCV 89 09/12/2024     09/12/2024            Weight Based Drug Calculations:    WEIGHT BASED DRUGS: NOT APPLICABLE / FLAT DOSE          Initiated By: Lupe Morris RN      All questions and concerns were addressed at time of visit. Patient was not independently evaluated by the Nurse Practitioner on site at [facility].    10/15/24 at 10:03 AM -  Lupe Morris RN

## 2024-10-31 ENCOUNTER — PATIENT OUTREACH (OUTPATIENT)
Dept: PRIMARY CARE | Facility: CLINIC | Age: 64
End: 2024-10-31
Payer: COMMERCIAL

## 2024-10-31 ENCOUNTER — LAB (OUTPATIENT)
Dept: LAB | Facility: LAB | Age: 64
End: 2024-10-31
Payer: COMMERCIAL

## 2024-10-31 DIAGNOSIS — E03.8 OTHER SPECIFIED HYPOTHYROIDISM: ICD-10-CM

## 2024-10-31 DIAGNOSIS — F41.9 ANXIETY: ICD-10-CM

## 2024-10-31 DIAGNOSIS — E78.2 MIXED HYPERLIPIDEMIA: ICD-10-CM

## 2024-10-31 DIAGNOSIS — F32.A DEPRESSION, UNSPECIFIED DEPRESSION TYPE: ICD-10-CM

## 2024-10-31 DIAGNOSIS — R73.9 HYPERGLYCEMIA: ICD-10-CM

## 2024-10-31 DIAGNOSIS — K26.4 BLEEDING DUODENAL ULCER: ICD-10-CM

## 2024-10-31 DIAGNOSIS — D64.9 ANEMIA, UNSPECIFIED TYPE: ICD-10-CM

## 2024-10-31 DIAGNOSIS — Z00.00 ROUTINE GENERAL MEDICAL EXAMINATION AT A HEALTH CARE FACILITY: ICD-10-CM

## 2024-10-31 LAB
ALBUMIN SERPL BCP-MCNC: 4.2 G/DL (ref 3.4–5)
ALP SERPL-CCNC: 84 U/L (ref 33–136)
ALT SERPL W P-5'-P-CCNC: 20 U/L (ref 7–45)
ANION GAP SERPL CALC-SCNC: 10 MMOL/L (ref 10–20)
AST SERPL W P-5'-P-CCNC: 22 U/L (ref 9–39)
BASOPHILS # BLD AUTO: 0.04 X10*3/UL (ref 0–0.1)
BASOPHILS NFR BLD AUTO: 0.9 %
BILIRUB SERPL-MCNC: 0.3 MG/DL (ref 0–1.2)
BUN SERPL-MCNC: 14 MG/DL (ref 6–23)
CALCIUM SERPL-MCNC: 10.2 MG/DL (ref 8.6–10.3)
CHLORIDE SERPL-SCNC: 106 MMOL/L (ref 98–107)
CO2 SERPL-SCNC: 29 MMOL/L (ref 21–32)
CREAT SERPL-MCNC: 0.68 MG/DL (ref 0.5–1.05)
EGFRCR SERPLBLD CKD-EPI 2021: >90 ML/MIN/1.73M*2
EOSINOPHIL # BLD AUTO: 0.04 X10*3/UL (ref 0–0.7)
EOSINOPHIL NFR BLD AUTO: 0.9 %
ERYTHROCYTE [DISTWIDTH] IN BLOOD BY AUTOMATED COUNT: 21.7 % (ref 11.5–14.5)
FERRITIN SERPL-MCNC: 123 NG/ML (ref 8–150)
GLUCOSE SERPL-MCNC: 93 MG/DL (ref 74–99)
HCT VFR BLD AUTO: 41.2 % (ref 36–46)
HGB BLD-MCNC: 12.4 G/DL (ref 12–16)
IMM GRANULOCYTES # BLD AUTO: 0.01 X10*3/UL (ref 0–0.7)
IMM GRANULOCYTES NFR BLD AUTO: 0.2 % (ref 0–0.9)
IRON SATN MFR SERPL: 18 % (ref 25–45)
IRON SERPL-MCNC: 69 UG/DL (ref 35–150)
LYMPHOCYTES # BLD AUTO: 1.52 X10*3/UL (ref 1.2–4.8)
LYMPHOCYTES NFR BLD AUTO: 35 %
MCH RBC QN AUTO: 27.2 PG (ref 26–34)
MCHC RBC AUTO-ENTMCNC: 30.1 G/DL (ref 32–36)
MCV RBC AUTO: 90 FL (ref 80–100)
MONOCYTES # BLD AUTO: 0.27 X10*3/UL (ref 0.1–1)
MONOCYTES NFR BLD AUTO: 6.2 %
NEUTROPHILS # BLD AUTO: 2.46 X10*3/UL (ref 1.2–7.7)
NEUTROPHILS NFR BLD AUTO: 56.8 %
NRBC BLD-RTO: 0 /100 WBCS (ref 0–0)
PLATELET # BLD AUTO: 288 X10*3/UL (ref 150–450)
POTASSIUM SERPL-SCNC: 4.3 MMOL/L (ref 3.5–5.3)
PROT SERPL-MCNC: 6.7 G/DL (ref 6.4–8.2)
RBC # BLD AUTO: 4.56 X10*6/UL (ref 4–5.2)
SODIUM SERPL-SCNC: 141 MMOL/L (ref 136–145)
T4 FREE SERPL-MCNC: 0.78 NG/DL (ref 0.61–1.12)
TIBC SERPL-MCNC: 382 UG/DL (ref 240–445)
TSH SERPL-ACNC: 0.72 MIU/L (ref 0.44–3.98)
UIBC SERPL-MCNC: 313 UG/DL (ref 110–370)
WBC # BLD AUTO: 4.3 X10*3/UL (ref 4.4–11.3)

## 2024-10-31 PROCEDURE — 83550 IRON BINDING TEST: CPT

## 2024-10-31 PROCEDURE — 83540 ASSAY OF IRON: CPT

## 2024-10-31 PROCEDURE — 82728 ASSAY OF FERRITIN: CPT

## 2024-10-31 PROCEDURE — 84443 ASSAY THYROID STIM HORMONE: CPT

## 2024-10-31 PROCEDURE — 84439 ASSAY OF FREE THYROXINE: CPT

## 2024-10-31 PROCEDURE — 80053 COMPREHEN METABOLIC PANEL: CPT

## 2024-10-31 PROCEDURE — 36415 COLL VENOUS BLD VENIPUNCTURE: CPT

## 2024-10-31 PROCEDURE — 85025 COMPLETE CBC W/AUTO DIFF WBC: CPT

## 2024-11-14 NOTE — PROGRESS NOTES
Patient ID: Cielo Peters is a 64 y.o. female.  Referring Physician: Keara Montes MD  [address] [place] [address]  Primary Care Provider: Keara Montes MD      Subjective    HPI  Ms. Cielo Peters is a 65 y/o F presenting for initial consultation at the request of Dr. Montes for anemia.    Most recent Hbg 12.4, however 2 months ago, 7.9. Patient had EGD on 10/14/24 that did show some inflamed changes in the duodenum and esophagitis with mucosa. Negative for H pylori. Most recent iron studies on 10/31/24 with iron saturation 18% and ferritin 123.    Patient reports she received 5 iron infusions and has continued the new PPI started at the hospital after finding she had 2 bleeding ulcers. No family history of any leukemias or lymphomas. Her grandmother had brain cancer that started on her tonsils and spread and she was a smoker.     Patient's past medical history, surgical history, family history and social history reviewed.    Objective     Vitals:    11/15/24 1304   BP: 126/70   Pulse: 86   Resp: 16   Temp: 36.5 °C (97.7 °F)   SpO2: 97%     LMP  (LMP Unknown)   Review of Systems:   Review of Systems:    Positive per HPI, otherwise negative.   Physical Exam  Gen: appears well in clinic, NAD  HEENT: atraumatic head, normocephalic, EOMI, conjunctiva normal  LUNG: no increased WOB, CTAB  CV: No JVD. RRR  GI: soft, NT, ND  LE: no LE edema  Skin: no obvious rashes or lesions on visible skin  Neuro: interactive, no focal deficits noted  Psych: normal mood and affect  Performance Status:  Symptomatic; fully ambulatory    Labs/Imaging/Pathology: personally reviewed reports and images in Epic electronic medical record system. Pertinent results as it related to the plan represented in below in assessment and plan.   Assessment/Plan    Iron deficiency anemia in response to blood loss from bleeding ulcers.  - Hbg was down to 7.9 two months ago  - EGD on 8/2/24 showed 2 bleeding  gastric ulcers.  - She was started on a PPI and received 5 iron infusions.  - Repeat EGD on 10/14/24 showed gastritis only.  - Hbg today is up to 12.4.  - We discussed she can stay off of iron at this time.  - No further hematologic work-up is needed at this time.  - She knows to call with any new symptoms or concerns or worsening SOB.  - RTC PRN.    RTC PRN. This note has been transcribed using a medical scribe and there is a possibility of unintentional typing misprints.    Diagnoses and all orders for this visit:  Anemia, unspecified type  -     Referral to Hematology and Oncology       Mary Galarza MD  Hematology/Oncology  [facility]    Scribe Attestation  By signing my name below, IShannan Scribe   attest that this documentation has been prepared under the direction and in the presence of Mary Galarza MD.   Time Spent  Prep time on day of patient encounter: 5 minutes  Time spent directly with patient, family or caregiver: 27 minutes  Additional Time Spent on Patient Care Activities: 5 minutes  Documentation Time: 5 minutes  Other Time Spent: 0 minutes  Total: 42 minutes

## 2024-11-15 ENCOUNTER — OFFICE VISIT (OUTPATIENT)
Dept: HEMATOLOGY/ONCOLOGY | Facility: CLINIC | Age: 64
End: 2024-11-15
Payer: COMMERCIAL

## 2024-11-15 VITALS
HEART RATE: 86 BPM | SYSTOLIC BLOOD PRESSURE: 126 MMHG | OXYGEN SATURATION: 97 % | DIASTOLIC BLOOD PRESSURE: 70 MMHG | BODY MASS INDEX: 30.2 KG/M2 | TEMPERATURE: 97.7 F | WEIGHT: 165.12 LBS | RESPIRATION RATE: 16 BRPM

## 2024-11-15 DIAGNOSIS — D64.9 ANEMIA, UNSPECIFIED TYPE: ICD-10-CM

## 2024-11-15 PROCEDURE — 99214 OFFICE O/P EST MOD 30 MIN: CPT | Performed by: INTERNAL MEDICINE

## 2024-11-15 PROCEDURE — 99204 OFFICE O/P NEW MOD 45 MIN: CPT | Performed by: INTERNAL MEDICINE

## 2024-11-15 ASSESSMENT — PAIN SCALES - GENERAL: PAINLEVEL_OUTOF10: 0-NO PAIN

## 2024-12-09 DIAGNOSIS — R73.9 HYPERGLYCEMIA: ICD-10-CM

## 2024-12-09 RX ORDER — METFORMIN HYDROCHLORIDE 500 MG/1
500 TABLET ORAL DAILY
Qty: 90 TABLET | Refills: 3 | Status: SHIPPED | OUTPATIENT
Start: 2024-12-09

## 2024-12-19 ENCOUNTER — APPOINTMENT (OUTPATIENT)
Dept: HEMATOLOGY/ONCOLOGY | Facility: CLINIC | Age: 64
End: 2024-12-19
Payer: COMMERCIAL

## 2025-01-01 DIAGNOSIS — K26.4 BLEEDING DUODENAL ULCER: ICD-10-CM

## 2025-01-02 RX ORDER — SUCRALFATE 1 G/10ML
1 SUSPENSION ORAL 2 TIMES DAILY
Qty: 600 ML | Refills: 2 | Status: SHIPPED | OUTPATIENT
Start: 2025-01-02

## 2025-01-03 DIAGNOSIS — Z79.899 MEDICATION MANAGEMENT: ICD-10-CM

## 2025-01-03 DIAGNOSIS — D64.9 ANEMIA, UNSPECIFIED TYPE: ICD-10-CM

## 2025-01-03 DIAGNOSIS — E03.9 HYPOTHYROIDISM, UNSPECIFIED TYPE: ICD-10-CM

## 2025-01-10 ENCOUNTER — LAB (OUTPATIENT)
Dept: LAB | Facility: LAB | Age: 65
End: 2025-01-10
Payer: COMMERCIAL

## 2025-01-10 DIAGNOSIS — D64.9 ANEMIA, UNSPECIFIED TYPE: ICD-10-CM

## 2025-01-10 DIAGNOSIS — Z79.899 MEDICATION MANAGEMENT: ICD-10-CM

## 2025-01-10 DIAGNOSIS — E03.9 HYPOTHYROIDISM, UNSPECIFIED TYPE: ICD-10-CM

## 2025-01-10 LAB
ALBUMIN SERPL BCP-MCNC: 4.3 G/DL (ref 3.4–5)
ALP SERPL-CCNC: 93 U/L (ref 33–136)
ALT SERPL W P-5'-P-CCNC: 20 U/L (ref 7–45)
ANION GAP SERPL CALC-SCNC: 10 MMOL/L (ref 10–20)
AST SERPL W P-5'-P-CCNC: 18 U/L (ref 9–39)
BASOPHILS # BLD AUTO: 0.04 X10*3/UL (ref 0–0.1)
BASOPHILS NFR BLD AUTO: 0.9 %
BILIRUB SERPL-MCNC: 0.3 MG/DL (ref 0–1.2)
BUN SERPL-MCNC: 19 MG/DL (ref 6–23)
CALCIUM SERPL-MCNC: 10 MG/DL (ref 8.6–10.3)
CHLORIDE SERPL-SCNC: 105 MMOL/L (ref 98–107)
CHOLEST SERPL-MCNC: 219 MG/DL (ref 0–199)
CHOLESTEROL/HDL RATIO: 4.1
CO2 SERPL-SCNC: 29 MMOL/L (ref 21–32)
CREAT SERPL-MCNC: 0.75 MG/DL (ref 0.5–1.05)
EGFRCR SERPLBLD CKD-EPI 2021: 89 ML/MIN/1.73M*2
EOSINOPHIL # BLD AUTO: 0.07 X10*3/UL (ref 0–0.7)
EOSINOPHIL NFR BLD AUTO: 1.6 %
ERYTHROCYTE [DISTWIDTH] IN BLOOD BY AUTOMATED COUNT: 14.9 % (ref 11.5–14.5)
FERRITIN SERPL-MCNC: 47 NG/ML (ref 8–150)
GLUCOSE SERPL-MCNC: 126 MG/DL (ref 74–99)
HCT VFR BLD AUTO: 44.1 % (ref 36–46)
HDLC SERPL-MCNC: 53 MG/DL
HGB BLD-MCNC: 14 G/DL (ref 12–16)
IMM GRANULOCYTES # BLD AUTO: 0.01 X10*3/UL (ref 0–0.7)
IMM GRANULOCYTES NFR BLD AUTO: 0.2 % (ref 0–0.9)
IRON SATN MFR SERPL: 18 % (ref 25–45)
IRON SERPL-MCNC: 68 UG/DL (ref 35–150)
LDLC SERPL CALC-MCNC: 121 MG/DL
LYMPHOCYTES # BLD AUTO: 1.62 X10*3/UL (ref 1.2–4.8)
LYMPHOCYTES NFR BLD AUTO: 36.7 %
MCH RBC QN AUTO: 29.9 PG (ref 26–34)
MCHC RBC AUTO-ENTMCNC: 31.7 G/DL (ref 32–36)
MCV RBC AUTO: 94 FL (ref 80–100)
MONOCYTES # BLD AUTO: 0.25 X10*3/UL (ref 0.1–1)
MONOCYTES NFR BLD AUTO: 5.7 %
NEUTROPHILS # BLD AUTO: 2.43 X10*3/UL (ref 1.2–7.7)
NEUTROPHILS NFR BLD AUTO: 54.9 %
NON HDL CHOLESTEROL: 166 MG/DL (ref 0–149)
NRBC BLD-RTO: 0 /100 WBCS (ref 0–0)
PLATELET # BLD AUTO: 270 X10*3/UL (ref 150–450)
POTASSIUM SERPL-SCNC: 4.1 MMOL/L (ref 3.5–5.3)
PROT SERPL-MCNC: 7 G/DL (ref 6.4–8.2)
RBC # BLD AUTO: 4.69 X10*6/UL (ref 4–5.2)
SODIUM SERPL-SCNC: 140 MMOL/L (ref 136–145)
T4 FREE SERPL-MCNC: 0.88 NG/DL (ref 0.61–1.12)
TIBC SERPL-MCNC: 369 UG/DL (ref 240–445)
TRIGL SERPL-MCNC: 225 MG/DL (ref 0–149)
TSH SERPL-ACNC: 0.8 MIU/L (ref 0.44–3.98)
UIBC SERPL-MCNC: 301 UG/DL (ref 110–370)
VLDL: 45 MG/DL (ref 0–40)
WBC # BLD AUTO: 4.4 X10*3/UL (ref 4.4–11.3)

## 2025-01-10 PROCEDURE — 83540 ASSAY OF IRON: CPT

## 2025-01-10 PROCEDURE — 80061 LIPID PANEL: CPT

## 2025-01-10 PROCEDURE — 82728 ASSAY OF FERRITIN: CPT

## 2025-01-10 PROCEDURE — 80053 COMPREHEN METABOLIC PANEL: CPT

## 2025-01-10 PROCEDURE — 83550 IRON BINDING TEST: CPT

## 2025-01-10 PROCEDURE — 84443 ASSAY THYROID STIM HORMONE: CPT

## 2025-01-10 PROCEDURE — 85025 COMPLETE CBC W/AUTO DIFF WBC: CPT

## 2025-01-10 PROCEDURE — 84439 ASSAY OF FREE THYROXINE: CPT

## 2025-01-20 ENCOUNTER — APPOINTMENT (OUTPATIENT)
Dept: PRIMARY CARE | Facility: CLINIC | Age: 65
End: 2025-01-20
Payer: COMMERCIAL

## 2025-01-20 VITALS
RESPIRATION RATE: 16 BRPM | BODY MASS INDEX: 31.65 KG/M2 | HEIGHT: 62 IN | SYSTOLIC BLOOD PRESSURE: 114 MMHG | OXYGEN SATURATION: 97 % | HEART RATE: 80 BPM | WEIGHT: 172 LBS | TEMPERATURE: 97.5 F | DIASTOLIC BLOOD PRESSURE: 66 MMHG

## 2025-01-20 DIAGNOSIS — L98.9 SKIN LESION: Primary | ICD-10-CM

## 2025-01-20 DIAGNOSIS — E03.9 HYPOTHYROIDISM, UNSPECIFIED TYPE: ICD-10-CM

## 2025-01-20 DIAGNOSIS — D64.9 ANEMIA, UNSPECIFIED TYPE: ICD-10-CM

## 2025-01-20 DIAGNOSIS — E78.2 MIXED HYPERLIPIDEMIA: ICD-10-CM

## 2025-01-20 DIAGNOSIS — L71.9 ROSACEA: ICD-10-CM

## 2025-01-20 DIAGNOSIS — F41.9 ANXIETY: ICD-10-CM

## 2025-01-20 DIAGNOSIS — F32.A DEPRESSION, UNSPECIFIED DEPRESSION TYPE: ICD-10-CM

## 2025-01-20 PROCEDURE — 3008F BODY MASS INDEX DOCD: CPT | Performed by: FAMILY MEDICINE

## 2025-01-20 PROCEDURE — 1036F TOBACCO NON-USER: CPT | Performed by: FAMILY MEDICINE

## 2025-01-20 PROCEDURE — 99214 OFFICE O/P EST MOD 30 MIN: CPT | Performed by: FAMILY MEDICINE

## 2025-01-20 RX ORDER — ROSUVASTATIN CALCIUM 10 MG/1
10 TABLET, COATED ORAL DAILY
Qty: 100 TABLET | Refills: 3 | Status: SHIPPED | OUTPATIENT
Start: 2025-01-20 | End: 2026-02-24

## 2025-01-20 RX ORDER — OXYMETAZOLINE HYDROCHLORIDE 30 G/1
CREAM TOPICAL
Qty: 30 G | Refills: 3 | Status: SHIPPED | OUTPATIENT
Start: 2025-01-20

## 2025-01-20 RX ORDER — MOMETASONE FUROATE 1 MG/G
OINTMENT TOPICAL DAILY
Qty: 30 G | Refills: 1 | Status: SHIPPED | OUTPATIENT
Start: 2025-01-20 | End: 2026-01-20

## 2025-01-20 ASSESSMENT — PATIENT HEALTH QUESTIONNAIRE - PHQ9
1. LITTLE INTEREST OR PLEASURE IN DOING THINGS: NOT AT ALL
SUM OF ALL RESPONSES TO PHQ9 QUESTIONS 1 AND 2: 0
2. FEELING DOWN, DEPRESSED OR HOPELESS: NOT AT ALL

## 2025-01-20 NOTE — PROGRESS NOTES
Covid vax: UTD  Flu: UTD per pt  RSV: advised  Shingles: advised    CRC: due 2028  Mammogram: 8/2024  Pap: hysterectomy  Lmp: hysterectomy

## 2025-01-20 NOTE — PROGRESS NOTES
Subjective   Patient ID: Cielo Peters is a 64 y.o. female who presents for Hyperlipidemia (Discuss labs), Hypothyroidism, Anemia, Depression, and Rash (On left leg since summer).  Covid vax: UTD  Flu: UTD per pt  RSV: advised  Shingles: advised     CRC: due 2028  Mammogram: 8/2024  Pap: hysterectomy  Lmp: hysterectomy  Ldl 121  Hdl 53    HPI  Patient Active Problem List   Diagnosis    Anxiety    CAD (coronary artery disease)    Candidal intertrigo    Depression    Difficulty sleeping    History of basal cell carcinoma (BCC)    History of squamous cell carcinoma    Hyperglycemia    Hypothyroidism    Mixed hyperlipidemia    Post laminectomy syndrome    Rosacea    Stenosis of lumbosacral spine    TMJ (temporomandibular joint disorder)    Overweight with body mass index (BMI) 25.0-29.9    Arthralgia    Conductive hearing loss, bilateral    Dysfunction of eustachian tube    Hematuria    Abnormal mammogram    Nephrolithiasis    Skin lesion of face    PONV (postoperative nausea and vomiting)    Syncope and collapse    Anemia       Past Surgical History:   Procedure Laterality Date    BACK SURGERY  2017    BREAST BIOPSY Left 2013    benign exc. bx in 2013 and core bx in 2022    CARDIAC CATHETERIZATION  2014    mostly (-) per pt    CARPAL TUNNEL RELEASE Right 1984    CHOLECYSTECTOMY  2013    COLONOSCOPY W/ POLYPECTOMY  10/2023    wnl-due 2028    LITHOTRIPSY Right 11/2020    TOTAL ABDOMINAL HYSTERECTOMY W/ BILATERAL SALPINGOOPHORECTOMY  2009    no ca       Review of Systems  This patient has  NO history of seizures/ +CAD no CVA    NO history of recent Covid nor flu symptoms,  NO Fever nor chills,  NO Chest pain, shortness of breath nor paroxysmal nocturnal dyspnea,  NO Nausea, vomiting, nor diarrhea,  NO Hematochezia nor melena,  NO Dysuria, hematuria, nor new incontinence issues  NO new severe headaches nor neurological complaints,  NO new issues with anxiety nor depression nor new psychiatric complaints,  NO suicidal  "nor homicidal ideations.     OBJECTIVE:  /66   Pulse 80   Temp 36.4 °C (97.5 °F) (Temporal)   Resp 16   Ht 1.575 m (5' 2\")   Wt 78 kg (172 lb)   LMP  (LMP Unknown)   SpO2 97%   BMI 31.46 kg/m²      General:  alert, oriented, no acute distress.  No obvious skin rashes noted.   No gait disturbance noted.    Mood is pleasant,  no signs of emotional distress.   Not appearing intoxicated or altered.   No voiced delusions,   Normal, appropriate behavior.    HEENT: Normocephalic, atraumatic,   Pupils round, reactive to light  Extraocular motions intact and wnl  Tympanic membranes normal    Neck: no nuchal rigidity  No masses palpable.  No carotid bruits.  No thyromegaly.    Respiratory: Equal breath sounds  No wheezes,    rales,    nor rhonchi  No respiratory distress.    Heart: Regular rate and rhythm, no    murmurs  no rubs/gallops    Abdomen: no masses palpable, nontender, no rebound nor guarding.  overwt  Extremities: NO cyanosis noted, no clubbing.   No edema noted.  2+dorsalis pedis pulses.    Normal-not antalgic, steady gait.  4cm patch LLE scaly and some fine papules   Looks like xerosis    Lab on 01/10/2025   Component Date Value Ref Range Status    WBC 01/10/2025 4.4  4.4 - 11.3 x10*3/uL Final    nRBC 01/10/2025 0.0  0.0 - 0.0 /100 WBCs Final    RBC 01/10/2025 4.69  4.00 - 5.20 x10*6/uL Final    Hemoglobin 01/10/2025 14.0  12.0 - 16.0 g/dL Final    Hematocrit 01/10/2025 44.1  36.0 - 46.0 % Final    MCV 01/10/2025 94  80 - 100 fL Final    MCH 01/10/2025 29.9  26.0 - 34.0 pg Final    MCHC 01/10/2025 31.7 (L)  32.0 - 36.0 g/dL Final    RDW 01/10/2025 14.9 (H)  11.5 - 14.5 % Final    Platelets 01/10/2025 270  150 - 450 x10*3/uL Final    Neutrophils % 01/10/2025 54.9  40.0 - 80.0 % Final    Immature Granulocytes %, Automated 01/10/2025 0.2  0.0 - 0.9 % Final    Immature Granulocyte Count (IG) includes promyelocytes, myelocytes and metamyelocytes but does not include bands. Percent differential counts (%) " should be interpreted in the context of the absolute cell counts (cells/UL).    Lymphocytes % 01/10/2025 36.7  13.0 - 44.0 % Final    Monocytes % 01/10/2025 5.7  2.0 - 10.0 % Final    Eosinophils % 01/10/2025 1.6  0.0 - 6.0 % Final    Basophils % 01/10/2025 0.9  0.0 - 2.0 % Final    Neutrophils Absolute 01/10/2025 2.43  1.20 - 7.70 x10*3/uL Final    Percent differential counts (%) should be interpreted in the context of the absolute cell counts (cells/uL).    Immature Granulocytes Absolute, Au* 01/10/2025 0.01  0.00 - 0.70 x10*3/uL Final    Lymphocytes Absolute 01/10/2025 1.62  1.20 - 4.80 x10*3/uL Final    Monocytes Absolute 01/10/2025 0.25  0.10 - 1.00 x10*3/uL Final    Eosinophils Absolute 01/10/2025 0.07  0.00 - 0.70 x10*3/uL Final    Basophils Absolute 01/10/2025 0.04  0.00 - 0.10 x10*3/uL Final    Glucose 01/10/2025 126 (H)  74 - 99 mg/dL Final    Sodium 01/10/2025 140  136 - 145 mmol/L Final    Potassium 01/10/2025 4.1  3.5 - 5.3 mmol/L Final    Chloride 01/10/2025 105  98 - 107 mmol/L Final    Bicarbonate 01/10/2025 29  21 - 32 mmol/L Final    Anion Gap 01/10/2025 10  10 - 20 mmol/L Final    Urea Nitrogen 01/10/2025 19  6 - 23 mg/dL Final    Creatinine 01/10/2025 0.75  0.50 - 1.05 mg/dL Final    eGFR 01/10/2025 89  >60 mL/min/1.73m*2 Final    Calculations of estimated GFR are performed using the 2021 CKD-EPI Study Refit equation without the race variable for the IDMS-Traceable creatinine methods.  https://jasn.asnjournals.org/content/early/2021/09/22/ASN.4221040331    Calcium 01/10/2025 10.0  8.6 - 10.3 mg/dL Final    Albumin 01/10/2025 4.3  3.4 - 5.0 g/dL Final    Alkaline Phosphatase 01/10/2025 93  33 - 136 U/L Final    Total Protein 01/10/2025 7.0  6.4 - 8.2 g/dL Final    AST 01/10/2025 18  9 - 39 U/L Final    Bilirubin, Total 01/10/2025 0.3  0.0 - 1.2 mg/dL Final    ALT 01/10/2025 20  7 - 45 U/L Final    Patients treated with Sulfasalazine may generate falsely decreased results for ALT.    Cholesterol  01/10/2025 219 (H)  0 - 199 mg/dL Final          Age      Desirable   Borderline High   High     0-19 Y     0 - 169       170 - 199     >/= 200    20-24 Y     0 - 189       190 - 224     >/= 225         >24 Y     0 - 199       200 - 239     >/= 240   **All ranges are based on fasting samples. Specific   therapeutic targets will vary based on patient-specific   cardiac risk.    Pediatric guidelines reference:Pediatrics 2011, 128(S5).Adult guidelines reference: NCEP ATPIII Guidelines,RADHIKA 2001, 258:2486-97    Venipuncture immediately after or during the administration of Metamizole may lead to falsely low results. Testing should be performed immediately prior to Metamizole dosing.    HDL-Cholesterol 01/10/2025 53.0  mg/dL Final      Age       Very Low   Low     Normal    High    0-19 Y    < 35      < 40     40-45     ----  20-24 Y    ----     < 40      >45      ----        >24 Y      ----     < 40     40-60      >60      Cholesterol/HDL Ratio 01/10/2025 4.1   Final      Ref Values  Desirable  < 3.4  High Risk  > 5.0    LDL Calculated 01/10/2025 121 (H)  <=99 mg/dL Final                                Near   Borderline      AGE      Desirable  Optimal    High     High     Very High     0-19 Y     0 - 109     ---    110-129   >/= 130     ----    20-24 Y     0 - 119     ---    120-159   >/= 160     ----      >24 Y     0 -  99   100-129  130-159   160-189     >/=190      VLDL 01/10/2025 45 (H)  0 - 40 mg/dL Final    Triglycerides 01/10/2025 225 (H)  0 - 149 mg/dL Final    Age              Desirable        Borderline         High        Very High  SEX:B           mg/dL             mg/dL               mg/dL      mg/dL  <=14D                       ----               ----        ----  15D-365D                    ----               ----        ----  1Y-9Y           0-74               75-99             >=100       ----  10Y-19Y        0-89                            >=130       ----  20Y-24Y        0-114              115-149             >=150      ----  >= 25Y         0-149             150-199             200-499    >=500      Venipuncture immediately after or during the administration of Metamizole may lead to falsely low results. Testing should be performed immediately prior to Metamizole dosing.    Non HDL Cholesterol 01/10/2025 166 (H)  0 - 149 mg/dL Final          Age       Desirable   Borderline High   High     Very High     0-19 Y     0 - 119       120 - 144     >/= 145    >/= 160    20-24 Y     0 - 149       150 - 189     >/= 190      ----         >24 Y    30 mg/dL above LDL Cholesterol goal      Ferritin 01/10/2025 47  8 - 150 ng/mL Final    Iron 01/10/2025 68  35 - 150 ug/dL Final    UIBC 01/10/2025 301  110 - 370 ug/dL Final    TIBC 01/10/2025 369  240 - 445 ug/dL Final    % Saturation 01/10/2025 18 (L)  25 - 45 % Final    Thyroid Stimulating Hormone 01/10/2025 0.80  0.44 - 3.98 mIU/L Final    Thyroxine, Free 01/10/2025 0.88  0.61 - 1.12 ng/dL Final   Lab on 10/31/2024   Component Date Value Ref Range Status    WBC 10/31/2024 4.3 (L)  4.4 - 11.3 x10*3/uL Final    nRBC 10/31/2024 0.0  0.0 - 0.0 /100 WBCs Final    RBC 10/31/2024 4.56  4.00 - 5.20 x10*6/uL Final    Hemoglobin 10/31/2024 12.4  12.0 - 16.0 g/dL Final    Hematocrit 10/31/2024 41.2  36.0 - 46.0 % Final    MCV 10/31/2024 90  80 - 100 fL Final    MCH 10/31/2024 27.2  26.0 - 34.0 pg Final    MCHC 10/31/2024 30.1 (L)  32.0 - 36.0 g/dL Final    RDW 10/31/2024 21.7 (H)  11.5 - 14.5 % Final    Platelets 10/31/2024 288  150 - 450 x10*3/uL Final    Neutrophils % 10/31/2024 56.8  40.0 - 80.0 % Final    Immature Granulocytes %, Automated 10/31/2024 0.2  0.0 - 0.9 % Final    Immature Granulocyte Count (IG) includes promyelocytes, myelocytes and metamyelocytes but does not include bands. Percent differential counts (%) should be interpreted in the context of the absolute cell counts (cells/UL).    Lymphocytes % 10/31/2024 35.0  13.0 - 44.0 % Final    Monocytes %  10/31/2024 6.2  2.0 - 10.0 % Final    Eosinophils % 10/31/2024 0.9  0.0 - 6.0 % Final    Basophils % 10/31/2024 0.9  0.0 - 2.0 % Final    Neutrophils Absolute 10/31/2024 2.46  1.20 - 7.70 x10*3/uL Final    Automated WBC differential has been confirmed by manual smear.  Percent differential counts (%) should be interpreted in the context of the absolute cell counts (cells/uL).    Immature Granulocytes Absolute, Au* 10/31/2024 0.01  0.00 - 0.70 x10*3/uL Final    Lymphocytes Absolute 10/31/2024 1.52  1.20 - 4.80 x10*3/uL Final    Monocytes Absolute 10/31/2024 0.27  0.10 - 1.00 x10*3/uL Final    Eosinophils Absolute 10/31/2024 0.04  0.00 - 0.70 x10*3/uL Final    Basophils Absolute 10/31/2024 0.04  0.00 - 0.10 x10*3/uL Final    Glucose 10/31/2024 93  74 - 99 mg/dL Final    Sodium 10/31/2024 141  136 - 145 mmol/L Final    Potassium 10/31/2024 4.3  3.5 - 5.3 mmol/L Final    Chloride 10/31/2024 106  98 - 107 mmol/L Final    Bicarbonate 10/31/2024 29  21 - 32 mmol/L Final    Anion Gap 10/31/2024 10  10 - 20 mmol/L Final    Urea Nitrogen 10/31/2024 14  6 - 23 mg/dL Final    Creatinine 10/31/2024 0.68  0.50 - 1.05 mg/dL Final    eGFR 10/31/2024 >90  >60 mL/min/1.73m*2 Final    Calculations of estimated GFR are performed using the 2021 CKD-EPI Study Refit equation without the race variable for the IDMS-Traceable creatinine methods.  https://jasn.asnjournals.org/content/early/2021/09/22/ASN.2835768965    Calcium 10/31/2024 10.2  8.6 - 10.3 mg/dL Final    Albumin 10/31/2024 4.2  3.4 - 5.0 g/dL Final    Alkaline Phosphatase 10/31/2024 84  33 - 136 U/L Final    Total Protein 10/31/2024 6.7  6.4 - 8.2 g/dL Final    AST 10/31/2024 22  9 - 39 U/L Final    Bilirubin, Total 10/31/2024 0.3  0.0 - 1.2 mg/dL Final    ALT 10/31/2024 20  7 - 45 U/L Final    Patients treated with Sulfasalazine may generate falsely decreased results for ALT.    Thyroid Stimulating Hormone 10/31/2024 0.72  0.44 - 3.98 mIU/L Final    Thyroxine, Free 10/31/2024  0.78  0.61 - 1.12 ng/dL Final    Ferritin 10/31/2024 123  8 - 150 ng/mL Final    Iron 10/31/2024 69  35 - 150 ug/dL Final    UIBC 10/31/2024 313  110 - 370 ug/dL Final    TIBC 10/31/2024 382  240 - 445 ug/dL Final    % Saturation 10/31/2024 18 (L)  25 - 45 % Final        Assessment/Plan     Problem List Items Addressed This Visit       Anxiety    Depression    Hypothyroidism    Mixed hyperlipidemia    Rosacea    Relevant Medications    oxymetazoline (Rhofade) 1 % cream    Anemia     Dc carafate  Continue protonix 6mo  Try off in April    Follow up at next scheduled visit -as planned  Anemia-resolved  Add crestor This medications risks, benefits, and alternatives were discussed with patient at length.  If any unwanted side effects occur-discontinue medicine and call the office for discussion.  Cmp lipids cbc in 3 and 6mo

## 2025-02-10 DIAGNOSIS — L71.9 ROSACEA: ICD-10-CM

## 2025-02-10 RX ORDER — OXYMETAZOLINE HYDROCHLORIDE 30 G/1
CREAM TOPICAL
Qty: 30 G | Refills: 3 | Status: SHIPPED | OUTPATIENT
Start: 2025-02-10

## 2025-02-10 NOTE — TELEPHONE ENCOUNTER
Pt called and states she would like her rx for the rhofade switched to [facility] due to not being able to get a 3 month supply from [facility] scripts

## 2025-03-04 ENCOUNTER — APPOINTMENT (OUTPATIENT)
Dept: PRIMARY CARE | Facility: CLINIC | Age: 65
End: 2025-03-04
Payer: COMMERCIAL

## 2025-04-22 DIAGNOSIS — R73.9 HYPERGLYCEMIA: ICD-10-CM

## 2025-04-22 DIAGNOSIS — E78.2 MIXED HYPERLIPIDEMIA: ICD-10-CM

## 2025-04-22 DIAGNOSIS — F32.A DEPRESSION, UNSPECIFIED DEPRESSION TYPE: ICD-10-CM

## 2025-04-22 DIAGNOSIS — M48.07 STENOSIS OF LUMBOSACRAL SPINE: ICD-10-CM

## 2025-04-22 DIAGNOSIS — E03.8 OTHER SPECIFIED HYPOTHYROIDISM: ICD-10-CM

## 2025-04-22 DIAGNOSIS — F41.9 ANXIETY: ICD-10-CM

## 2025-04-22 RX ORDER — TIZANIDINE 2 MG/1
2 TABLET ORAL NIGHTLY PRN
Qty: 90 TABLET | Refills: 3 | Status: CANCELLED | OUTPATIENT
Start: 2025-04-22

## 2025-04-22 RX ORDER — VENLAFAXINE HYDROCHLORIDE 150 MG/1
150 CAPSULE, EXTENDED RELEASE ORAL DAILY
Qty: 90 CAPSULE | Refills: 3 | Status: CANCELLED | OUTPATIENT
Start: 2025-04-22

## 2025-04-22 RX ORDER — METFORMIN HYDROCHLORIDE 500 MG/1
500 TABLET ORAL DAILY
Qty: 90 TABLET | Refills: 3 | Status: CANCELLED | OUTPATIENT
Start: 2025-04-22

## 2025-04-22 RX ORDER — ROSUVASTATIN CALCIUM 10 MG/1
10 TABLET, COATED ORAL DAILY
Qty: 100 TABLET | Refills: 3 | Status: CANCELLED | OUTPATIENT
Start: 2025-04-22 | End: 2026-05-27

## 2025-04-22 RX ORDER — LEVOTHYROXINE SODIUM 88 UG/1
88 TABLET ORAL DAILY
Qty: 90 TABLET | Refills: 3 | Status: CANCELLED | OUTPATIENT
Start: 2025-04-22

## 2025-06-04 ENCOUNTER — OFFICE VISIT (OUTPATIENT)
Dept: PRIMARY CARE | Facility: CLINIC | Age: 65
End: 2025-06-04
Payer: MEDICARE

## 2025-06-04 VITALS
TEMPERATURE: 96.9 F | HEIGHT: 62 IN | OXYGEN SATURATION: 97 % | DIASTOLIC BLOOD PRESSURE: 72 MMHG | WEIGHT: 169.6 LBS | HEART RATE: 83 BPM | BODY MASS INDEX: 31.21 KG/M2 | RESPIRATION RATE: 16 BRPM | SYSTOLIC BLOOD PRESSURE: 102 MMHG

## 2025-06-04 DIAGNOSIS — L50.9 HIVES: ICD-10-CM

## 2025-06-04 DIAGNOSIS — N89.8 VAGINAL ITCHING: Primary | ICD-10-CM

## 2025-06-04 PROCEDURE — 3008F BODY MASS INDEX DOCD: CPT

## 2025-06-04 PROCEDURE — 1036F TOBACCO NON-USER: CPT

## 2025-06-04 PROCEDURE — 1160F RVW MEDS BY RX/DR IN RCRD: CPT

## 2025-06-04 PROCEDURE — 99213 OFFICE O/P EST LOW 20 MIN: CPT

## 2025-06-04 PROCEDURE — 1159F MED LIST DOCD IN RCRD: CPT

## 2025-06-04 RX ORDER — FLUCONAZOLE 150 MG/1
150 TABLET ORAL ONCE
Qty: 1 TABLET | Refills: 0 | Status: SHIPPED | OUTPATIENT
Start: 2025-06-04 | End: 2025-06-04

## 2025-06-04 RX ORDER — CLOBETASOL PROPIONATE 0.5 MG/G
CREAM TOPICAL 2 TIMES DAILY
Qty: 15 G | Refills: 0 | Status: SHIPPED | OUTPATIENT
Start: 2025-06-04 | End: 2025-06-11

## 2025-06-04 RX ORDER — CLOTRIMAZOLE 1 %
CREAM (GRAM) TOPICAL 2 TIMES DAILY
Qty: 30 G | Refills: 0 | Status: SHIPPED | OUTPATIENT
Start: 2025-06-04 | End: 2025-06-11

## 2025-06-04 NOTE — PROGRESS NOTES
Subjective   Patient ID: Cielo Peters is a 65 y.o. female who presents for Vaginal Itching (The past 3 weeks she has been having vaginal irritation/itchy, burning, swelling out side of vagina. She is not sure if she is having burning urination. Also has hives across her abdominal noticed on 5/30/25.Tried a OTC yeast med and anti itch cream. No changes to her soaps, underwear etc.).    HPI   Vaginal itching + abdominal hives  Patient is here in clinic today with 3-week vaginal itching, and 4 to 5-day abdominal hives.  Patient states she normally gets hives when she is sick although denies any current illness or fever.  Patient with history of sensitive skin including rosacea that she uses Rhofade daily, patient has used Elocon in the past for this under breasts.  Patient cannot pinpoint anything that would have brought these rashes or hives on.  Patient denies new sex partners, douching, new sex toys.  Patient denies new medications or herbal supplements.  Patient denies pelvic pain, vaginal discharge, lower urinary symptoms, shortness of breath, throat constriction.   - Onset: 3 weeks. Hives started 4-5 days ago.   - Location: Vagina and moved to rectal area 3 weeks. Abdomen hives.   - Duration: vagina consistent. Abdomen hives consistent for 4-5 days.   - Characteristics: Vagina itching, burning, swollen. Abdomen itching, red.  - Aggravating factors: None.   - Relieving factors: Vagisil anti itching cream.   - Treatment: Benadryl for hives.     Review of Systems  Constitutional: Patient denies any fever, chills, appetite change, fatigue, diaphoresis, or unexpected weight change.  HEENT: Patient denies for congestion, ear pain, hearing loss, nosebleeds, postnasal drip, rhinorrhea, sinus pressure, sneezing, sore throat, tinnitus, trouble swallowing and voice change.   Eyes: Chronic dry eyes. Patient denies for photophobia, pain, discharge, redness, itching, and visual disturbance.   Respiratory: Patient denies  "any cough, chest tightness, shortness breath, or wheezing.  Cardiovascular: Patient denies any chest pain, shortness of breath with exertion, tachycardia, palpitations, orthopnea, leg swelling, or paroxysmal nocturnal dyspnea.  Gastrointestinal Reports mild constipation. Patient denies any nausea, vomiting, diarrhea, abdominal distention, melena, hematochezia, or reflux symptoms.  Endocrine: Patient denies any polyuria, polydipsia, polyphagia, or heat/cold intolerance.  Genitourinary:  Patient denies dysuria, flank pain, frequency, hematuria, or urgency.   Musculoskeletal: Chronic arthritis. Patient denies back pain, joint swelling, myalgias, neck pain, and neck stiffness.   Skin: Abdominal hives, itching. Vaginal irritation, itchy, burning. Denies any wounds, or skin lesions.   Neurology: Patient denies any new motor or sensory losses, numbness, tingling, weakness, and incoordination of the extremities, tremors, seizures, dizziness, facial asymmetry, or gait instability.  Hematological: Patient denies bruises, bleeding, or adenopathy.   Psychological/ Behavioral: Patient denies agitation, behavioral problems, confusion, dysphoric mood, hallucinations, self-injury, sleep disturbance, and suicidal ideation.   Female GYN: Reports vaginal itching. Reports painful when urine touches skin. Patient denies pelvic pain, abnormal discharge, dysuria. Pain denies pain or bleeding with intercourse.      Objective   Resp 16   Ht 1.575 m (5' 2\")   Wt 76.9 kg (169 lb 9.6 oz)   LMP  (LMP Unknown)   BMI 31.02 kg/m²     Physical Exam  Genitourinary:         Comments: Red area marked as the mild erythematous.  Small black [place] indicates the small papule.      Constitutional: Awake, alert, and oriented. In no acute distress, no diaphoresis, well-developed, well-nourished. Appears stated age. .   Skin: Scattered abdominal hives, small under 1 cm lightly erythematous, circular hives.  Good turgor, moist, warm and without jaundice, " rashes or lesions.  Female GYN: labial and perineal mild erythematous noted.  Tenderness on palpation.  No open wounds or excoriation.  Small, under 1 cm pimple like papule on right labia majora. Medical assistant present as witness for pelvic exam.     Assessment/Plan   Diagnoses and all orders for this visit:  Vaginal itching  -Patient likely has an overgrowth of yeast or fungus, will treat externally and internally.  If irritation does not go away after treatment, patient is to reach out, patient complains of vaginal atrophy, painful sex, estrogen cream may be considered.  -     clotrimazole (Lotrimin) 1 % cream; Apply topically 2 times a day for 7 days. Apply externally to labia majora twice daily for 7 days for itching and irritation  -     fluconazole (Diflucan) 150 mg tablet; Take 1 tablet (150 mg) by mouth 1 time for 1 dose.  Hives  -     clobetasol (Temovate) 0.05 % cream; Apply topically 2 times a day for 7 days. Apply to abdomen hives twice daily for 7 days    Follow Up  -Patient is to keep upcoming appointment with PCP for July with labs done prior.  -Patient is aware to reach back out to provider if symptoms do not improve after treatment is complete.   -Patient is aware to go to the emergency room if the patient would develop fever with worsening symptoms, chest pain, shortness of breath.   -The patient and/or caregiver has verbalized understanding of the above treatment plan and have no further questions at this time.     Cielo Peters- please be aware that any referrals discussed today in this visit should be placed as well as tests/labs ordered should result in prompt scheduling today. If not done today-then a phone call for scheduling is expected in a timely manner (within 2 weeks). If testing is to be done-a result should be available to patient within 2 weeks time unless otherwise specified.  Please reach out if you have not heard results back within a timely manner.    You, the patient or  caregiver, are responsible for making sure what was discussed in this visit is actually scheduled and completed. If suboptimal understanding of results of tests or referral reason- a follow up appointment with me or your primary provider should be made. If above recommended testing/referrals/labs/treatment ect does NOT occur-you are to connect with us for explanation. Patient understands that should they have testing outside  facilities that we may not receive the results and was told to call us if they have not heard from our office within a week after testing.     Please take into consideration, dragon voice recognition dictation software was utilized partially in the preparation of this note, therefore, inaccuracies in spelling, word choice and punctuation may have occurred which were not recognized at the time of signing.

## 2025-07-21 ENCOUNTER — PHARMACY VISIT (OUTPATIENT)
Dept: PHARMACY | Facility: CLINIC | Age: 65
End: 2025-07-21
Payer: MEDICARE

## 2025-07-21 ENCOUNTER — APPOINTMENT (OUTPATIENT)
Dept: PRIMARY CARE | Facility: CLINIC | Age: 65
End: 2025-07-21
Payer: COMMERCIAL

## 2025-07-21 VITALS
DIASTOLIC BLOOD PRESSURE: 60 MMHG | OXYGEN SATURATION: 97 % | BODY MASS INDEX: 31.65 KG/M2 | RESPIRATION RATE: 16 BRPM | HEART RATE: 92 BPM | HEIGHT: 62 IN | WEIGHT: 172 LBS | SYSTOLIC BLOOD PRESSURE: 108 MMHG | TEMPERATURE: 97.1 F

## 2025-07-21 DIAGNOSIS — E03.9 HYPOTHYROIDISM, UNSPECIFIED TYPE: ICD-10-CM

## 2025-07-21 DIAGNOSIS — F41.9 ANXIETY: ICD-10-CM

## 2025-07-21 DIAGNOSIS — I25.10 CORONARY ARTERY DISEASE INVOLVING NATIVE CORONARY ARTERY OF NATIVE HEART WITHOUT ANGINA PECTORIS: ICD-10-CM

## 2025-07-21 DIAGNOSIS — Z00.00 WELCOME TO MEDICARE PREVENTIVE VISIT: ICD-10-CM

## 2025-07-21 DIAGNOSIS — H65.01 NON-RECURRENT ACUTE SEROUS OTITIS MEDIA OF RIGHT EAR: Primary | ICD-10-CM

## 2025-07-21 DIAGNOSIS — F32.A DEPRESSION, UNSPECIFIED DEPRESSION TYPE: ICD-10-CM

## 2025-07-21 DIAGNOSIS — Z12.31 ENCOUNTER FOR SCREENING MAMMOGRAM FOR MALIGNANT NEOPLASM OF BREAST: ICD-10-CM

## 2025-07-21 DIAGNOSIS — D64.9 ANEMIA, UNSPECIFIED TYPE: ICD-10-CM

## 2025-07-21 DIAGNOSIS — R73.9 HYPERGLYCEMIA: ICD-10-CM

## 2025-07-21 DIAGNOSIS — E78.2 MIXED HYPERLIPIDEMIA: ICD-10-CM

## 2025-07-21 PROCEDURE — 1159F MED LIST DOCD IN RCRD: CPT | Performed by: FAMILY MEDICINE

## 2025-07-21 PROCEDURE — 1170F FXNL STATUS ASSESSED: CPT | Performed by: FAMILY MEDICINE

## 2025-07-21 PROCEDURE — G0438 PPPS, INITIAL VISIT: HCPCS | Performed by: FAMILY MEDICINE

## 2025-07-21 PROCEDURE — 1123F ACP DISCUSS/DSCN MKR DOCD: CPT | Performed by: FAMILY MEDICINE

## 2025-07-21 PROCEDURE — 1036F TOBACCO NON-USER: CPT | Performed by: FAMILY MEDICINE

## 2025-07-21 PROCEDURE — 1158F ADVNC CARE PLAN TLK DOCD: CPT | Performed by: FAMILY MEDICINE

## 2025-07-21 PROCEDURE — RXMED WILLOW AMBULATORY MEDICATION CHARGE

## 2025-07-21 PROCEDURE — 3008F BODY MASS INDEX DOCD: CPT | Performed by: FAMILY MEDICINE

## 2025-07-21 PROCEDURE — 1160F RVW MEDS BY RX/DR IN RCRD: CPT | Performed by: FAMILY MEDICINE

## 2025-07-21 RX ORDER — VENLAFAXINE HYDROCHLORIDE 75 MG/1
225 CAPSULE, EXTENDED RELEASE ORAL DAILY
Qty: 270 CAPSULE | Refills: 3 | Status: SHIPPED | OUTPATIENT
Start: 2025-07-21 | End: 2025-09-19

## 2025-07-21 RX ORDER — AMOXICILLIN 875 MG/1
875 TABLET, COATED ORAL 2 TIMES DAILY
Qty: 20 TABLET | Refills: 0 | Status: SHIPPED | OUTPATIENT
Start: 2025-07-21 | End: 2025-07-31

## 2025-07-21 ASSESSMENT — ACTIVITIES OF DAILY LIVING (ADL)
DRESSING: INDEPENDENT
DOING_HOUSEWORK: INDEPENDENT
GROCERY_SHOPPING: INDEPENDENT
MANAGING_FINANCES: INDEPENDENT
TAKING_MEDICATION: INDEPENDENT
BATHING: INDEPENDENT

## 2025-07-21 ASSESSMENT — ENCOUNTER SYMPTOMS
LOSS OF SENSATION IN FEET: 0
OCCASIONAL FEELINGS OF UNSTEADINESS: 0
DEPRESSION: 1

## 2025-07-21 ASSESSMENT — PATIENT HEALTH QUESTIONNAIRE - PHQ9
1. LITTLE INTEREST OR PLEASURE IN DOING THINGS: NOT AT ALL
2. FEELING DOWN, DEPRESSED OR HOPELESS: SEVERAL DAYS
SUM OF ALL RESPONSES TO PHQ9 QUESTIONS 1 AND 2: 1

## 2025-07-21 NOTE — PROGRESS NOTES
Subjective   Reason for Visit: Cielo Peters is a 65 y.o. female here for a Medicare Wellness visit.   Covid vax: UTD  Flu: x 3  Pneumo: advised  RSV: advised  Shingles: advised     CRC: 10/2023-due 2028  Mammogram: 8/8/2024-ordered  Pap: hysterectomy  Lmp: hysterectomy     ECG 8/2024  Has had an eye exam in the past 12 months     CHECKLIST REVIEWED AND COMPLETE FOR [facility]  Welcome To Medicare, Hyperlipidemia, Hypothyroidism, Depression, and Anxiety  ---------------------------------------------------------------------------------------------  Past Medical, Surgical, and Family History reviewed and updated in chart.    Reviewed all medications by prescribing practitioner or clinical pharmacist (such as prescriptions, OTCs, herbal therapies and supplements) and documented in the medical record.    HPI    Patient Self Assessment of Health Status  Patient Self Assessment: Good    Nutrition and Exercise:    Current Diet: HEALTHY Diet always best, minimizing excess carbs,   weight reduction advised if BMI not WNL, please maintain a NORMAL BMI 18.5-24.9    Adequate Fluid Intake: Yes  Caffeine: -aware to minimize intake, <300mg best to even take in LESS  Exercise Frequency: Regularly advised, weight bearing, strengthening, aerobic    Functional Ability/Level of Safety:  Home safety addressed: no active new concerns,   fall risk addressed  NO new hearing issues or concerns  [place] in ADLs addressed and areas of assistance if present -noted    ANY Cognitive Impairment Observed: No cognitive impairment observed    Home Safety Risk Factors: None  --------------------------------------------------------------------------------------------  Patient Care Team:  Keara Montes MD as PCP - General    HPI  Problem List[1]   Surgical History[2]      PHQ2(-) No active depressed mood or not in crisis, 5min. spent in discussion.    Review of Systems:    NO Seizures  No known ? Major CAD had ca5th per pt mild cad so will  "resume appt w dr MONTERO  NO CVA    This patient has   NO history of recent Covid nor flu symptoms,  NO Fever nor chills,  NO Chest pain, shortness of breath nor paroxysmal nocturnal dyspnea,  NO Nausea, vomiting, nor diarrhea,  NO Hematochezia nor melena,  NO Dysuria, hematuria, nor new incontinence issues  NO new severe headaches nor neurological complaints,  NO new issues with anxiety nor depression nor new psychiatric complaints,  NO suicidal nor homicidal ideations.  ---------------------------------------------------------------------------------------------   OBJECTIVE:  /60   Pulse 92   Temp 36.2 °C (97.1 °F) (Temporal)   Resp 16   Ht 1.575 m (5' 2\")   Wt 78 kg (172 lb)   LMP  (LMP Unknown)   SpO2 97%   BMI 31.46 kg/m²      General:  alert, oriented, no acute distress.  No obvious skin rashes noted.   No gait disturbance noted.    Mood is pleasant, not tearful, no signs of emotional distress.  Not appearing intoxicated or altered.   No voiced delusions,   Normal, appropriate behavior.    HEENT: Normocephalic, atraumatic,   Pupils round, reactive to light  Extraocular motions intact and wnl  Tympanic membranes normal but with fluid      Neck: no nuchal rigidity  No masses palpable.  No carotid bruits.  No thyromegaly.    Respiratory: Equal breath sounds  No wheezes,    rales,    nor rhonchi  No respiratory distress.    Heart: Regular rate and rhythm, no    murmurs  no rubs/gallops    Abdomen: no masses palpable, no rebound nor guarding, no rebound nor guarding.  owt  Extremities: NO cyanosis noted, no clubbing.   No edema noted.  2+dorsalis pedis pulses.    Normal-not antalgic, steady gait.    Patient Message on 07/07/2025   Component Date Value Ref Range Status    GLUCOSE 07/14/2025 112 (H)  65 - 99 mg/dL Final    Comment:               Fasting reference interval     For someone without known diabetes, a glucose value  between 100 and 125 mg/dL is consistent with  prediabetes and should be " confirmed with a  follow-up test.         UREA NITROGEN (BUN) 07/14/2025 17  7 - 25 mg/dL Final    CREATININE 07/14/2025 0.69  0.50 - 1.05 mg/dL Final    EGFR 07/14/2025 96  > OR = 60 mL/min/1.73m2 Final    SODIUM 07/14/2025 140  135 - 146 mmol/L Final    POTASSIUM 07/14/2025 4.4  3.5 - 5.3 mmol/L Final    CHLORIDE 07/14/2025 104  98 - 110 mmol/L Final    CARBON DIOXIDE 07/14/2025 28  20 - 32 mmol/L Final    ELECTROLYTE BALANCE 07/14/2025 8  7 - 17 mmol/L (calc) Final    CALCIUM 07/14/2025 10.4  8.6 - 10.4 mg/dL Final    PROTEIN, TOTAL 07/14/2025 7.0  6.1 - 8.1 g/dL Final    ALBUMIN 07/14/2025 4.5  3.6 - 5.1 g/dL Final    BILIRUBIN, TOTAL 07/14/2025 0.4  0.2 - 1.2 mg/dL Final    ALKALINE PHOSPHATASE 07/14/2025 92  37 - 153 U/L Final    AST 07/14/2025 24  10 - 35 U/L Final    ALT 07/14/2025 26  6 - 29 U/L Final    CHOLESTEROL, TOTAL 07/14/2025 139  <200 mg/dL Final    HDL CHOLESTEROL 07/14/2025 56  > OR = 50 mg/dL Final    TRIGLYCERIDES 07/14/2025 181 (H)  <150 mg/dL Final    LDL-CHOLESTEROL 07/14/2025 57  mg/dL (calc) Final    Comment: Reference range: <100     Desirable range <100 mg/dL for primary prevention;    <70 mg/dL for patients with CHD or diabetic patients   with > or = 2 CHD risk factors.     LDL-C is now calculated using the Harpal-Baldwin   calculation, which is a validated novel method providing   better accuracy than the Friedewald equation in the   estimation of LDL-C.   Harpal EVANS et al. RADHIKA. 2013;310(19): 8993-3286   (http://education.[employer]ics/faq/THP817)      CHOL/HDLC RATIO 07/14/2025 2.5  <5.0 (calc) Final    NON HDL CHOLESTEROL 07/14/2025 83  <130 mg/dL (calc) Final    Comment: For patients with diabetes plus 1 major ASCVD risk   factor, treating to a non-HDL-C goal of <100 mg/dL   (LDL-C of <70 mg/dL) is considered a therapeutic   option.      WHITE BLOOD CELL COUNT 07/14/2025 4.4  3.8 - 10.8 Thousand/uL Final    RED BLOOD CELL COUNT 07/14/2025 4.50  3.80 - 5.10 Million/uL Final     HEMOGLOBIN 07/14/2025 14.5  11.7 - 15.5 g/dL Final    HEMATOCRIT 07/14/2025 44.1  35.0 - 45.0 % Final    MCV 07/14/2025 98.0  80.0 - 100.0 fL Final    MCH 07/14/2025 32.2  27.0 - 33.0 pg Final    MCHC 07/14/2025 32.9  32.0 - 36.0 g/dL Final    Comment: For adults, a slight decrease in the calculated MCHC  value (in the range of 30 to 32 g/dL) is most likely  not clinically significant; however, it should be  interpreted with caution in correlation with other  red cell parameters and the patient's clinical  condition.      RDW 07/14/2025 12.5  11.0 - 15.0 % Final    PLATELET COUNT 07/14/2025 252  140 - 400 Thousand/uL Final    MPV 07/14/2025 11.7  7.5 - 12.5 fL Final    TSH 07/14/2025 0.52  0.40 - 4.50 mIU/L Final    T4, FREE 07/14/2025 1.1  0.8 - 1.8 ng/dL Final    HEMOGLOBIN A1c 07/14/2025 5.5  <5.7 % Final    Comment: For the purpose of screening for the presence of  diabetes:     <5.7%       Consistent with the absence of diabetes  5.7-6.4%    Consistent with increased risk for diabetes              (prediabetes)  > or =6.5%  Consistent with diabetes     This assay result is consistent with a decreased risk  of diabetes.     Currently, no consensus exists regarding use of  hemoglobin A1c for diagnosis of diabetes in children.     According to American Diabetes Association (ADA)  guidelines, hemoglobin A1c <7.0% represents optimal  control in non-pregnant diabetic patients. Different  metrics may apply to specific patient populations.   Standards of Medical Care in Diabetes(ADA).          eAG (mg/dL) 07/14/2025 111  mg/dL Final    eAG (mmol/L) 07/14/2025 6.2  mmol/L Final    FERRITIN 07/14/2025 23  16 - 288 ng/mL Final        Assessment/Plan     Problem List Items Addressed This Visit       Anxiety    Depression    Hyperglycemia    Hypothyroidism    Mixed hyperlipidemia    Anemia     Other Visit Diagnoses         Welcome to Medicare preventive visit          Encounter for screening mammogram for malignant neoplasm  of breast        Relevant Orders    BI mammo bilateral screening tomosynthesis          Advance Care Planning Note   Discussion Date: 07/21/25   Discussion Participants: patient  16 min spent discussing ACP w pt    The patient wishes to discuss Advance Care Planning today and the following is a brief summary of our discussion.     Patient has capacity to make their own medical decisions: Yes  Health Care Agent/Surrogate Decision Maker documented in chart: Yes    Documents on file and valid:  Advance Directive/Living Will: advised today  Health Care Power of : advised today  Communication of Medical Status/Prognosis:   yes   Communication of Treatment Goals/Options:   yes  Treatment Decisions/involved with patient today  yes  Time Statement: Total face to face time spent on advance care planning was <30 minutes with <30 minutes spent in counseling, including the explanation.    SEE ME AT NEXT REGULARLY SCHEDULED VISIT-sooner if condition deteriorates or new problems arise.    PATIENT WOULD LIKE TO BE A FULL CODE    NO uncontrolled DEPRESSION noted SOME NOTED  Feels effexor is helpful    Assessed and reviewed for opioid use.  NO EVIDENCE OF SIGNIFICANT DEMENTIA    Signs and symptoms of concern with depression-if in crisis -(no current HI/SI) will let us know and proceed to ER   Signs/symptoms of concern with dementia-and need to contact us if they occur discussed.    ASCVD  3 %   addressed and risk reduction conversation took place  Sees CARDIO    All above counseling 15 minutes in conversation/documentation etc    Mood counseling 5min- no uncontrolled depression, good support system, has crisis plan if occurs.  Included BMI counseling and options if BMI>30  Ccs 0    QUESTIONS answered  May need abtc for ear  Amoxil  The risks, benefits, and alternatives for the antibiotic prescribed were discussed with patient as well as possible side effects. If ANY signs or symptoms of allergic reaction patient is to  discontinue medicine immediately and call us or GO TO ER if tongue swelling/respiratory issues or significant effects.  It has already been determined that the benefits outweigh the risks of an antibiotic for you at this time, unless otherwise indicated.  Antibiotics are usually meant to be taken to completion as advised and it is usual course for symptoms to IMPROVE while taking-if symptoms worsen or new problems arise, we should be notified or medical evaluation should occur. Persistent side effects such as diarrhea especially after antibiotic discontinuation are unusual, and should prompt assessment and evaluation either in office or ER depending on severity. Rash, and/or other symptoms of concern should also cause evaluation.  Complete resolution of original symptoms are expected and we should be informed if this does not occur.    Next visit addressed -regular visit as scheduled and follow up sooner if condition deterioration or new problems arise.    This completes Cielo Peters ANNUAL MEDICARE WELLNESS VISIT today.  If no other follow ups discussed: Please follow up in 1 year for NEXT ANNUAL MEDICARE WELLNESS VISIT.  Cielo Peters -be aware that any referrals discussed should be placed today or tests/labs ordered should result in prompt scheduling today.   If not done today-then a phone call for scheduling is expected in a timely manner(within 2 weeks).   If testing is to be done-a result should be available to patient within 2 weeks time unless otherwise specified.   You, the patient or caregiver, are responsible for making sure what was discussed is actually scheduled and completed.  If suboptimal understanding of results of tests or referral reason-a follow up appointment with me should be made.  If above does NOT occur-you are to connect with us for an explanation.  Has some nightmares at times  Declines counseling for now  Some insomnia  May benefit from calm  Labs in 6mo    Follow up at next  scheduled visit -as planned or directed today.  Sooner if new or unresolved issues of concern.    Cielo Peters We know you have a choice for your health care, THANK YOU for choosing us and [facility].  We APPRECIATE YOU.  Sincerely,   Keara Montes MD   (dr. Vidal)      This documentation is subject to inadvertent typing and other similar clerical/grammatic errors etc.             [1]   Patient Active Problem List  Diagnosis    Anxiety    CAD (coronary artery disease)    Candidal intertrigo    Depression    Difficulty sleeping    History of basal cell carcinoma (BCC)    History of squamous cell carcinoma    Hyperglycemia    Hypothyroidism    Mixed hyperlipidemia    Post laminectomy syndrome    Rosacea    Stenosis of lumbosacral spine    TMJ (temporomandibular joint disorder)    Overweight with body mass index (BMI) 25.0-29.9    Arthralgia    Conductive hearing loss, bilateral    Dysfunction of eustachian tube    Hematuria    Abnormal mammogram    Nephrolithiasis    Skin lesion of face    PONV (postoperative nausea and vomiting)    Syncope and collapse    Anemia   [2]   Past Surgical History:  Procedure Laterality Date    BACK SURGERY  2017    BREAST BIOPSY Left 2013    benign exc. bx in 2013 and core bx in 2022    CARDIAC CATHETERIZATION  2014    mostly (-) per pt    CARPAL TUNNEL RELEASE Right 1984    CHOLECYSTECTOMY  2013    COLONOSCOPY W/ POLYPECTOMY  10/2023    wnl-due 2028    LITHOTRIPSY Right 11/2020    TOTAL ABDOMINAL HYSTERECTOMY W/ BILATERAL SALPINGOOPHORECTOMY  2009    no ca

## 2025-07-21 NOTE — PROGRESS NOTES
Covid vax: UTD  Flu: x 3  Pneumo: advised  RSV: advised  Shingles: advised    CRC: 10/2023-due 2028  Mammogram: 8/8/2024-ordered  Pap: hysterectomy  Lmp: hysterectomy    ECG 8/2024  Has had an eye exam in the past 12 months

## 2025-08-14 ENCOUNTER — APPOINTMENT (OUTPATIENT)
Dept: RADIOLOGY | Facility: HOSPITAL | Age: 65
End: 2025-08-14
Payer: MEDICARE

## 2025-08-19 ENCOUNTER — HOSPITAL ENCOUNTER (OUTPATIENT)
Dept: RADIOLOGY | Facility: HOSPITAL | Age: 65
Discharge: HOME | End: 2025-08-19
Payer: MEDICARE

## 2025-08-19 DIAGNOSIS — Z12.31 ENCOUNTER FOR SCREENING MAMMOGRAM FOR MALIGNANT NEOPLASM OF BREAST: ICD-10-CM

## 2025-08-19 PROCEDURE — 77067 SCR MAMMO BI INCL CAD: CPT | Performed by: RADIOLOGY

## 2025-08-19 PROCEDURE — 77063 BREAST TOMOSYNTHESIS BI: CPT

## 2025-08-19 PROCEDURE — 77063 BREAST TOMOSYNTHESIS BI: CPT | Performed by: RADIOLOGY

## 2025-09-11 ENCOUNTER — APPOINTMENT (OUTPATIENT)
Dept: CARDIOLOGY | Facility: CLINIC | Age: 65
End: 2025-09-11
Payer: MEDICARE

## 2026-01-21 ENCOUNTER — APPOINTMENT (OUTPATIENT)
Dept: PRIMARY CARE | Facility: CLINIC | Age: 66
End: 2026-01-21
Payer: MEDICARE

## (undated) DEVICE — GOWN,AURORA,NONREINFORCED,LARGE: Brand: MEDLINE

## (undated) DEVICE — ELECTRODE PT RET AD L9FT HI MOIST COND ADH HYDRGEL CORDED

## (undated) DEVICE — TUBING, SUCTION, 1/4" X 10', STRAIGHT: Brand: MEDLINE

## (undated) DEVICE — SPONGE: LAP 4X18 W XR 200/CS: Brand: MEDICAL ACTION INDUSTRIES

## (undated) DEVICE — INTENDED FOR TISSUE SEPARATION, AND OTHER PROCEDURES THAT REQUIRE A SHARP SURGICAL BLADE TO PUNCTURE OR CUT.: Brand: BARD-PARKER ® CARBON RIB-BACK BLADES

## (undated) DEVICE — PAD SANITARY VERSALON MATERNITY REG

## (undated) DEVICE — SIGMOIDOSCOPE MED L25CM DIA20MM W/ OBT DISP KLEENSPEC

## (undated) DEVICE — SYRINGE IRRIG 60ML SFT PLIABLE BLB EZ TO GRP 1 HND USE W/

## (undated) DEVICE — GLOVE SURG 5.5 PF POLYISOPRENE WHT STRL SENSICARE MIC

## (undated) DEVICE — DBD-PACK,LITHOTOMY,PK II,AURORA: Brand: MEDLINE

## (undated) DEVICE — MEDI-VAC YANKAUER SUCTION HANDLE W/BULBOUS TIP: Brand: CARDINAL HEALTH

## (undated) DEVICE — SUTURE SZ 3-0 L27IN ABSRB BRN SH-1 L22MM 1/2 CIR SGL ARMED G182H

## (undated) DEVICE — SIGMOIDOSCOPIC SUCTION INSTRUMENT 18 FR W/WINGED CAP CONTROL AND 6 FOOT (1.8M) TUBING: Brand: SIGMOIDOSCOPIC

## (undated) DEVICE — PENCIL ES L3M BTTN SWCH HOLSTER W/ BLDE ELECTRD EDGE

## (undated) DEVICE — LABEL MED MINI W/ MARKER

## (undated) DEVICE — 1842 FOAM BLOCK NEEDLE COUNTER: Brand: DEVON

## (undated) DEVICE — SKIN MARKER,REGULAR TIP WITH RULER: Brand: DEVON

## (undated) DEVICE — TRAY PREP DRY W/ PREM GLV 2 APPL 6 SPNG 2 UNDPD 1 OVERWRAP